# Patient Record
Sex: FEMALE | Race: BLACK OR AFRICAN AMERICAN | NOT HISPANIC OR LATINO | Employment: FULL TIME | URBAN - METROPOLITAN AREA
[De-identification: names, ages, dates, MRNs, and addresses within clinical notes are randomized per-mention and may not be internally consistent; named-entity substitution may affect disease eponyms.]

---

## 2017-11-09 ENCOUNTER — PATIENT MESSAGE (OUTPATIENT)
Dept: OBSTETRICS AND GYNECOLOGY | Facility: CLINIC | Age: 37
End: 2017-11-09

## 2017-11-20 ENCOUNTER — PATIENT MESSAGE (OUTPATIENT)
Dept: OBSTETRICS AND GYNECOLOGY | Facility: CLINIC | Age: 37
End: 2017-11-20

## 2017-12-08 ENCOUNTER — PATIENT MESSAGE (OUTPATIENT)
Dept: OBSTETRICS AND GYNECOLOGY | Facility: CLINIC | Age: 37
End: 2017-12-08

## 2017-12-14 ENCOUNTER — OFFICE VISIT (OUTPATIENT)
Dept: OBSTETRICS AND GYNECOLOGY | Facility: CLINIC | Age: 37
End: 2017-12-14
Attending: OBSTETRICS & GYNECOLOGY
Payer: COMMERCIAL

## 2017-12-14 VITALS
SYSTOLIC BLOOD PRESSURE: 158 MMHG | BODY MASS INDEX: 39.58 KG/M2 | WEIGHT: 252.19 LBS | DIASTOLIC BLOOD PRESSURE: 110 MMHG | HEIGHT: 67 IN

## 2017-12-14 DIAGNOSIS — I10 ESSENTIAL HYPERTENSION: ICD-10-CM

## 2017-12-14 DIAGNOSIS — D25.1 FIBROIDS, INTRAMURAL: ICD-10-CM

## 2017-12-14 DIAGNOSIS — Z01.419 VISIT FOR GYNECOLOGIC EXAMINATION: Primary | ICD-10-CM

## 2017-12-14 DIAGNOSIS — Z80.3 FAMILY HISTORY OF BREAST CANCER: ICD-10-CM

## 2017-12-14 PROCEDURE — 99385 PREV VISIT NEW AGE 18-39: CPT | Mod: S$GLB,,, | Performed by: OBSTETRICS & GYNECOLOGY

## 2017-12-14 PROCEDURE — 88175 CYTOPATH C/V AUTO FLUID REDO: CPT

## 2017-12-14 PROCEDURE — 87624 HPV HI-RISK TYP POOLED RSLT: CPT

## 2017-12-14 PROCEDURE — 99999 PR PBB SHADOW E&M-EST. PATIENT-LVL III: CPT | Mod: PBBFAC,,, | Performed by: OBSTETRICS & GYNECOLOGY

## 2017-12-14 RX ORDER — LABETALOL 200 MG/1
100 TABLET, FILM COATED ORAL EVERY 12 HOURS
Qty: 60 TABLET | Refills: 11 | Status: SHIPPED | OUTPATIENT
Start: 2017-12-14 | End: 2018-06-12 | Stop reason: SDUPTHER

## 2017-12-14 NOTE — PROGRESS NOTES
"CC: Well woman exam    Brianne Blas is a 37 y.o. female  presents for a well woman exam.  She has no issues, problems, or complaints.    Patient is interested in conceiving.      She has had two miscarriages.  She was found at that time to have fibroids on ultrasound.  Last ultrasound in  shows:    Findings: Uterus measures 11.8 x 9.5 x 8.7 cm.  Endometrial echocomplex measures 0.3 cm, within normal limits.  No intrauterine gestational sac identified.  There are multiple uterine leiomyomas.  Dominant mucosal/subserosal leiomyoma located posteriorly measures 6.1 x 5.9 x 5.2 cm.  Additional smaller intramural myomas decrease sensitivity of examination.    Right ovary measures 3.1 x 1.7 x 2.3 cm and demonstrates normal follicles.  Normal ovarian vascular flow is present.    Left ovary measures 3.1 x 1.4 x 2.6 cm and demonstrates normal follicles.  Normal ovarian vascular flow is present.    No free fluid.    She has also had an evaluation for APA which was negative.    She has a history of hypertension and had been on Procardia but self d/c'd due to SE.          Past Medical History:   Diagnosis Date    Hypertension     Migraine        History reviewed. No pertinent surgical history.    OB History    Para Term  AB Living   2       2     SAB TAB Ectopic Multiple Live Births   2              # Outcome Date GA Lbr Padilla/2nd Weight Sex Delivery Anes PTL Lv   2 SAB 10/2016           1 2016                  Family History   Problem Relation Age of Onset    Breast cancer Maternal Grandmother     Hypertension Father     Breast cancer Mother 40     Breast Cancer x 2    Colon cancer Neg Hx     Ovarian cancer Neg Hx        Social History   Substance Use Topics    Smoking status: Never Smoker    Smokeless tobacco: Not on file    Alcohol use No       BP (!) 158/110   Ht 5' 7" (1.702 m)   Wt 114.4 kg (252 lb 3.3 oz)   LMP 2017 (Exact Date)   BMI 39.50 kg/m²     ROS:  GENERAL: " Denies weight gain or weight loss. Feeling well overall.   SKIN: Denies rash or lesions.   HEAD: Denies head injury or headache.   NODES: Denies enlarged lymph nodes.   CHEST: Denies chest pain or shortness of breath.   CARDIOVASCULAR: Denies palpitations or left sided chest pain.   ABDOMEN: No abdominal pain, constipation, diarrhea, nausea, vomiting or rectal bleeding.   URINARY: No frequency, dysuria, hematuria, or burning on urination.  REPRODUCTIVE: See HPI.   BREASTS: The patient performs breast self-examination and denies pain, lumps, or nipple discharge.   HEMATOLOGIC: No easy bruisability or excessive bleeding.  MUSCULOSKELETAL: Denies joint pain or swelling.   NEUROLOGIC: Denies syncope or weakness.   PSYCHIATRIC: Denies depression, anxiety or mood swings.    Physical Exam:    APPEARANCE: Well nourished, well developed, in no acute distress.  AFFECT: WNL, alert and oriented x 3  SKIN: No acne or hirsutism  NECK: Neck symmetric without masses or thyromegaly  NODES: No inguinal, cervical, axillary, or femoral lymph node enlargement  CHEST: Good respiratory effect  ABDOMEN: Soft.  No tenderness or masses.  No hepatosplenomegaly.  No hernias.  BREASTS: Symmetrical, no skin changes or visible lesions.  No palpable masses, nipple discharge bilaterally.  PELVIC: Normal external genitalia without lesions.  Normal hair distribution.  Adequate perineal body, normal urethral meatus.  Vagina moist and well rugated without lesions or discharge.  Cervix pink, without lesions, discharge or tenderness.  No significant cystocele or rectocele.  Bimanual exam shows uterus to be approximately 12 week size, irregular with posterior fullness, mobile and nontender.  Adnexa without masses or tenderness.    EXTREMITIES: No edema.    ASSESSMENT AND PLAN  1. Visit for gynecologic examination  Liquid-based pap smear, screening    HPV High Risk Genotypes, PCR   2. Family history of breast cancer     3. Fibroids, intramural  US Pelvis  Comp with Transvag NON-OB (xpd   4. Essential hypertension  labetalol (NORMODYNE) 200 MG tablet       Patient was counseled today on A.C.S. Pap guidelines and recommendations for yearly pelvic exams, pap smears every 5 years with HPV co-testing, and monthly self breast exams; to see her PCP for other health maintenance.     Discussed fibroids and possible contribution to miscarriage if there is a submucosal component - recommended a repeat ultrasound    Rx for Labetalol give for hypertension - BP check in 1 week    Return in about 1 year (around 12/14/2018).

## 2017-12-19 ENCOUNTER — OFFICE VISIT (OUTPATIENT)
Dept: URGENT CARE | Facility: CLINIC | Age: 37
End: 2017-12-19
Payer: COMMERCIAL

## 2017-12-19 VITALS
RESPIRATION RATE: 18 BRPM | HEART RATE: 74 BPM | SYSTOLIC BLOOD PRESSURE: 142 MMHG | WEIGHT: 252 LBS | TEMPERATURE: 99 F | BODY MASS INDEX: 39.55 KG/M2 | OXYGEN SATURATION: 98 % | DIASTOLIC BLOOD PRESSURE: 83 MMHG | HEIGHT: 67 IN

## 2017-12-19 DIAGNOSIS — R52 BODY ACHES: ICD-10-CM

## 2017-12-19 DIAGNOSIS — J06.9 VIRAL URI WITH COUGH: Primary | ICD-10-CM

## 2017-12-19 LAB
CTP QC/QA: YES
FLUAV AG NPH QL: NEGATIVE
FLUBV AG NPH QL: NEGATIVE

## 2017-12-19 PROCEDURE — 96372 THER/PROPH/DIAG INJ SC/IM: CPT | Mod: S$GLB,,, | Performed by: EMERGENCY MEDICINE

## 2017-12-19 PROCEDURE — 99203 OFFICE O/P NEW LOW 30 MIN: CPT | Mod: 25,S$GLB,, | Performed by: PHYSICIAN ASSISTANT

## 2017-12-19 PROCEDURE — 87804 INFLUENZA ASSAY W/OPTIC: CPT | Mod: 59,QW,S$GLB, | Performed by: PHYSICIAN ASSISTANT

## 2017-12-19 RX ORDER — BENZONATATE 100 MG/1
200 CAPSULE ORAL 3 TIMES DAILY PRN
Qty: 20 CAPSULE | Refills: 0 | Status: SHIPPED | OUTPATIENT
Start: 2017-12-19 | End: 2018-01-11

## 2017-12-19 RX ORDER — BETAMETHASONE SODIUM PHOSPHATE AND BETAMETHASONE ACETATE 3; 3 MG/ML; MG/ML
6 INJECTION, SUSPENSION INTRA-ARTICULAR; INTRALESIONAL; INTRAMUSCULAR; SOFT TISSUE
Status: COMPLETED | OUTPATIENT
Start: 2017-12-19 | End: 2017-12-19

## 2017-12-19 RX ADMIN — BETAMETHASONE SODIUM PHOSPHATE AND BETAMETHASONE ACETATE 6 MG: 3; 3 INJECTION, SUSPENSION INTRA-ARTICULAR; INTRALESIONAL; INTRAMUSCULAR; SOFT TISSUE at 11:12

## 2017-12-19 NOTE — LETTER
December 19, 2017      [facility]  [address] [address], [address]  [place] [address]  Phone: [phone]  Fax: [phone]       Patient: Brianne Blas   YOB: 1980  Date of Visit: 12/19/2017    To Whom It May Concern:    Shania Blas  was at [facility] on 12/19/2017. She may return to work/school on 12/21/2017 with no restrictions. If you have any questions or concerns, or if I can be of further assistance, please do not hesitate to contact me.    Sincerely,    Jackie Caballero PA-C

## 2017-12-19 NOTE — PATIENT INSTRUCTIONS
Viral Upper Respiratory Illness (Adult)  You have a viral upper respiratory illness (URI), which is another term for the common cold. This illness is contagious during the first few days. It is spread through the air by coughing and sneezing. It may also be spread by direct contact (touching the sick person and then touching your own eyes, nose, or mouth). Frequent handwashing will decrease risk of spread. Most viral illnesses go away within 7 to 10 days with rest and simple home remedies. Sometimes the illness may last for several weeks. Antibiotics will not kill a virus, and they are generally not prescribed for this condition.    Home care  · If symptoms are severe, rest at home for the first 2 to 3 days. When you resume activity, don't let yourself get too tired.  · Avoid being exposed to cigarette smoke (yours or others).  · You may use acetaminophen or ibuprofen to control pain and fever, unless another medicine was prescribed. (Note: If you have chronic liver or kidney disease, have ever had a stomach ulcer or gastrointestinal bleeding, or are taking blood-thinning medicines, talk with your healthcare provider before using these medicines.) Aspirin should never be given to anyone under 18 years of age who is ill with a viral infection or fever. It may cause severe liver or brain damage.  · Your appetite may be poor, so a light diet is fine. Avoid dehydration by drinking 6 to 8 glasses of fluids per day (water, soft drinks, juices, tea, or soup). Extra fluids will help loosen secretions in the nose and lungs.  · Over-the-counter cold medicines will not shorten the length of time youre sick, but they may be helpful for the following symptoms: cough, sore throat, and nasal and sinus congestion. (Note: Do not use decongestants if you have high blood pressure.)  Follow-up care  Follow up with your healthcare provider, or as advised.  When to seek medical advice  Call your healthcare provider right away if any  of these occur:  · Cough with lots of colored sputum (mucus)  · Severe headache; face, neck, or ear pain  · Difficulty swallowing due to throat pain  · Fever of 100.4°F (38°C)  Call 911, or get immediate medical care  Call emergency services right away if any of these occur:  · Chest pain, shortness of breath, wheezing, or difficulty breathing  · Coughing up blood  · Inability to swallow due to throat pain  Date Last Reviewed: 9/13/2015  © 4476-6945 [employer]. [address] [place] [address]. All rights reserved. This information is not intended as a substitute for professional medical care. Always follow your healthcare professional's instructions.      Please follow up with your Primary care provider within 2-5 days if your signs and symptoms have not resolved or worsen.     If your condition worsens or fails to improve we recommend that you receive another evaluation at the emergency room immediately or contact your primary medical clinic to discuss your concerns.   You must understand that you have received an Urgent Care treatment only and that you may be released before all of your medical problems are known or treated. You, the patient, will arrange for follow up care as instructed.

## 2017-12-19 NOTE — PROGRESS NOTES
"Subjective:       Patient ID: Brianne Blas is a 37 y.o. female.    Vitals:  height is 5' 7" (1.702 m) and weight is 114.3 kg (252 lb). Her temperature is 99.4 °F (37.4 °C). Her blood pressure is 142/83 (abnormal) and her pulse is 74. Her respiration is 18 and oxygen saturation is 98%.     Chief Complaint: Cough and Sinus Problem    Cough   This is a new problem. The current episode started in the past 7 days. The problem has been gradually worsening. The cough is productive of sputum. Associated symptoms include nasal congestion and postnasal drip. Pertinent negatives include no chest pain, chills, ear pain, eye redness, fever, headaches, myalgias, sore throat, shortness of breath or wheezing. She has tried OTC cough suppressant for the symptoms. The treatment provided mild relief.   Sinus Problem   This is a new problem. The current episode started in the past 7 days. The problem has been gradually worsening since onset. There has been no fever. Associated symptoms include congestion and coughing. Pertinent negatives include no chills, ear pain, headaches, hoarse voice, shortness of breath or sore throat. Past treatments include oral decongestants. The treatment provided no relief.     Review of Systems   Constitution: Positive for weakness. Negative for chills, fever and malaise/fatigue.   HENT: Positive for congestion and postnasal drip. Negative for ear pain, hoarse voice and sore throat.    Eyes: Negative for discharge and redness.   Cardiovascular: Negative for chest pain, dyspnea on exertion and leg swelling.   Respiratory: Positive for cough and sputum production. Negative for shortness of breath and wheezing.    Musculoskeletal: Negative for myalgias.   Gastrointestinal: Negative for abdominal pain and nausea.   Neurological: Negative for headaches.       Objective:      Physical Exam   Constitutional: She is oriented to person, place, and time. She appears well-developed and well-nourished. She is " cooperative.  Non-toxic appearance. She does not appear ill. No distress.   HENT:   Head: Normocephalic and atraumatic.   Right Ear: Hearing, tympanic membrane, external ear and ear canal normal.   Left Ear: Hearing, tympanic membrane, external ear and ear canal normal.   Nose: Rhinorrhea present. No mucosal edema or nasal deformity. No epistaxis. Right sinus exhibits no maxillary sinus tenderness and no frontal sinus tenderness. Left sinus exhibits no maxillary sinus tenderness and no frontal sinus tenderness.   Mouth/Throat: Uvula is midline and mucous membranes are normal. No trismus in the jaw. Normal dentition. No uvula swelling. Posterior oropharyngeal erythema present.   Eyes: Conjunctivae and lids are normal. No scleral icterus.   Sclera clear bilat   Neck: Trachea normal, full passive range of motion without pain and phonation normal. Neck supple.   Cardiovascular: Normal rate, regular rhythm, normal heart sounds, intact distal pulses and normal pulses.    Pulmonary/Chest: Effort normal and breath sounds normal. No accessory muscle usage. No respiratory distress. She has no decreased breath sounds. She has no wheezes. She has no rhonchi. She has no rales.   Occasional nonproductive cough   Abdominal: Soft. Normal appearance and bowel sounds are normal. She exhibits no distension. There is no tenderness.   Musculoskeletal: Normal range of motion. She exhibits no edema or deformity.   Neurological: She is alert and oriented to person, place, and time. She exhibits normal muscle tone. Coordination normal.   Skin: Skin is warm, dry and intact. She is not diaphoretic. No pallor.   Psychiatric: She has a normal mood and affect. Her speech is normal and behavior is normal. Judgment and thought content normal. Cognition and memory are normal.   Nursing note and vitals reviewed.      Assessment:       1. Viral URI with cough    2. Body aches        Plan:         Viral URI with cough  -     benzonatate (TESSALON  PERLES) 100 MG capsule; Take 2 capsules (200 mg total) by mouth 3 (three) times daily as needed for Cough.  Dispense: 20 capsule; Refill: 0  -     betamethasone acetate-betamethasone sodium phosphate injection 6 mg; Inject 1 mL (6 mg total) into the muscle one time.    Body aches  -     POCT Influenza A/B        Viral Upper Respiratory Illness (Adult)  You have a viral upper respiratory illness (URI), which is another term for the common cold. This illness is contagious during the first few days. It is spread through the air by coughing and sneezing. It may also be spread by direct contact (touching the sick person and then touching your own eyes, nose, or mouth). Frequent handwashing will decrease risk of spread. Most viral illnesses go away within 7 to 10 days with rest and simple home remedies. Sometimes the illness may last for several weeks. Antibiotics will not kill a virus, and they are generally not prescribed for this condition.    Home care  · If symptoms are severe, rest at home for the first 2 to 3 days. When you resume activity, don't let yourself get too tired.  · Avoid being exposed to cigarette smoke (yours or others).  · You may use acetaminophen or ibuprofen to control pain and fever, unless another medicine was prescribed. (Note: If you have chronic liver or kidney disease, have ever had a stomach ulcer or gastrointestinal bleeding, or are taking blood-thinning medicines, talk with your healthcare provider before using these medicines.) Aspirin should never be given to anyone under 18 years of age who is ill with a viral infection or fever. It may cause severe liver or brain damage.  · Your appetite may be poor, so a light diet is fine. Avoid dehydration by drinking 6 to 8 glasses of fluids per day (water, soft drinks, juices, tea, or soup). Extra fluids will help loosen secretions in the nose and lungs.  · Over-the-counter cold medicines will not shorten the length of time youre sick, but they  may be helpful for the following symptoms: cough, sore throat, and nasal and sinus congestion. (Note: Do not use decongestants if you have high blood pressure.)  Follow-up care  Follow up with your healthcare provider, or as advised.  When to seek medical advice  Call your healthcare provider right away if any of these occur:  · Cough with lots of colored sputum (mucus)  · Severe headache; face, neck, or ear pain  · Difficulty swallowing due to throat pain  · Fever of 100.4°F (38°C)  Call 911, or get immediate medical care  Call emergency services right away if any of these occur:  · Chest pain, shortness of breath, wheezing, or difficulty breathing  · Coughing up blood  · Inability to swallow due to throat pain  Date Last Reviewed: 9/13/2015  © 7842-1656 [employer]. [address] [place] [address], [place] [address]. All rights reserved. This information is not intended as a substitute for professional medical care. Always follow your healthcare professional's instructions.      Please follow up with your Primary care provider within 2-5 days if your signs and symptoms have not resolved or worsen.     If your condition worsens or fails to improve we recommend that you receive another evaluation at the emergency room immediately or contact your primary medical clinic to discuss your concerns.   You must understand that you have received an Urgent Care treatment only and that you may be released before all of your medical problems are known or treated. You, the patient, will arrange for follow up care as instructed.

## 2017-12-20 ENCOUNTER — HOSPITAL ENCOUNTER (OUTPATIENT)
Dept: RADIOLOGY | Facility: HOSPITAL | Age: 37
Discharge: HOME | End: 2017-12-20
Attending: OBSTETRICS & GYNECOLOGY
Payer: COMMERCIAL

## 2017-12-20 DIAGNOSIS — D25.1 FIBROIDS, INTRAMURAL: ICD-10-CM

## 2017-12-20 LAB
HPV16 AG SPEC QL: NEGATIVE
HPV16+18+H RISK 12 DNA CVX-IMP: NEGATIVE
HPV18 DNA SPEC QL NAA+PROBE: NEGATIVE

## 2017-12-20 PROCEDURE — 76856 US EXAM PELVIC COMPLETE: CPT | Mod: TC

## 2017-12-20 PROCEDURE — 76830 TRANSVAGINAL US NON-OB: CPT | Mod: 26,,, | Performed by: RADIOLOGY

## 2017-12-20 PROCEDURE — 76856 US EXAM PELVIC COMPLETE: CPT | Mod: 26,,, | Performed by: RADIOLOGY

## 2017-12-21 ENCOUNTER — PATIENT MESSAGE (OUTPATIENT)
Dept: OBSTETRICS AND GYNECOLOGY | Facility: CLINIC | Age: 37
End: 2017-12-21

## 2017-12-21 ENCOUNTER — OFFICE VISIT (OUTPATIENT)
Dept: OBSTETRICS AND GYNECOLOGY | Facility: CLINIC | Age: 37
End: 2017-12-21
Payer: COMMERCIAL

## 2017-12-21 VITALS
SYSTOLIC BLOOD PRESSURE: 140 MMHG | DIASTOLIC BLOOD PRESSURE: 84 MMHG | WEIGHT: 252.13 LBS | BODY MASS INDEX: 39.57 KG/M2 | HEIGHT: 67 IN

## 2017-12-21 DIAGNOSIS — Z01.30 BP CHECK: Primary | ICD-10-CM

## 2017-12-21 DIAGNOSIS — I10 ESSENTIAL HYPERTENSION: ICD-10-CM

## 2017-12-21 PROCEDURE — 99999 PR PBB SHADOW E&M-EST. PATIENT-LVL III: CPT | Mod: PBBFAC,,, | Performed by: NURSE PRACTITIONER

## 2017-12-21 PROCEDURE — 99213 OFFICE O/P EST LOW 20 MIN: CPT | Mod: S$GLB,,, | Performed by: NURSE PRACTITIONER

## 2017-12-21 NOTE — PROGRESS NOTES
CC: BP check    HPI: Pt is a 37 y.o.  female who presents for a BP check. She recently saw Dr. Hammonds and was dx with HTN. She was started on labetalol 100 mg PO BID. She is not having any problems today. She has been taking it for 1 week now. BP at last visit was 158/110. Today it is 140/84.    ROS:  GENERAL: Feeling well overall. Denies fever or chills.   SKIN: Denies rash or lesions.   HEAD: Denies head injury or headache.   NODES: Denies enlarged lymph nodes.   CHEST: Denies chest pain or shortness of breath.   CARDIOVASCULAR: Denies palpitations or left sided chest pain.   ABDOMEN: No abdominal pain, constipation, diarrhea, nausea, vomiting or rectal bleeding.   URINARY: No dysuria, hematuria, or burning on urination.  REPRODUCTIVE: See HPI.   BREASTS: Denies pain, lumps, or nipple discharge.   HEMATOLOGIC: No easy bruisability or excessive bleeding.   MUSCULOSKELETAL: Denies joint pain or swelling.   NEUROLOGIC: Denies syncope or weakness.   PSYCHIATRIC: Denies depression, anxiety or mood swings.    PE:   APPEARANCE: Well nourished, well developed, Black or  female in no acute distress.  PELVIC: Deferred    Diagnosis:  1. BP check    2. Essential hypertension        Plan:   -continue with labetalol as instructed.

## 2018-01-11 ENCOUNTER — HOSPITAL ENCOUNTER (OUTPATIENT)
Dept: RADIOLOGY | Facility: HOSPITAL | Age: 38
Discharge: HOME | End: 2018-01-11
Attending: OBSTETRICS & GYNECOLOGY
Payer: COMMERCIAL

## 2018-01-11 ENCOUNTER — OFFICE VISIT (OUTPATIENT)
Dept: OBSTETRICS AND GYNECOLOGY | Facility: CLINIC | Age: 38
End: 2018-01-11
Attending: OBSTETRICS & GYNECOLOGY
Payer: COMMERCIAL

## 2018-01-11 VITALS
BODY MASS INDEX: 38.72 KG/M2 | HEIGHT: 67 IN | WEIGHT: 246.69 LBS | DIASTOLIC BLOOD PRESSURE: 110 MMHG | SYSTOLIC BLOOD PRESSURE: 150 MMHG

## 2018-01-11 DIAGNOSIS — D25.1 FIBROIDS, INTRAMURAL: Primary | ICD-10-CM

## 2018-01-11 DIAGNOSIS — D25.1 FIBROIDS, INTRAMURAL: ICD-10-CM

## 2018-01-11 PROCEDURE — 99999 PR PBB SHADOW E&M-EST. PATIENT-LVL III: CPT | Mod: PBBFAC,,, | Performed by: OBSTETRICS & GYNECOLOGY

## 2018-01-11 PROCEDURE — 99214 OFFICE O/P EST MOD 30 MIN: CPT | Mod: S$GLB,,, | Performed by: OBSTETRICS & GYNECOLOGY

## 2018-01-11 PROCEDURE — 25500020 PHARM REV CODE 255: Performed by: OBSTETRICS & GYNECOLOGY

## 2018-01-11 PROCEDURE — A9585 GADOBUTROL INJECTION: HCPCS | Performed by: OBSTETRICS & GYNECOLOGY

## 2018-01-11 PROCEDURE — 72197 MRI PELVIS W/O & W/DYE: CPT | Mod: 26,,, | Performed by: RADIOLOGY

## 2018-01-11 PROCEDURE — 72197 MRI PELVIS W/O & W/DYE: CPT | Mod: TC

## 2018-01-11 RX ORDER — GADOBUTROL 604.72 MG/ML
10 INJECTION INTRAVENOUS
Status: COMPLETED | OUTPATIENT
Start: 2018-01-11 | End: 2018-01-11

## 2018-01-11 RX ORDER — ALPRAZOLAM 1 MG/1
1 TABLET ORAL ONCE
Qty: 1 TABLET | Refills: 0 | Status: SHIPPED | OUTPATIENT
Start: 2018-01-11 | End: 2018-03-08 | Stop reason: CLARIF

## 2018-01-11 RX ADMIN — GADOBUTROL 10 ML: 604.72 INJECTION INTRAVENOUS at 02:01

## 2018-01-14 ENCOUNTER — PATIENT MESSAGE (OUTPATIENT)
Dept: OBSTETRICS AND GYNECOLOGY | Facility: CLINIC | Age: 38
End: 2018-01-14

## 2018-01-18 NOTE — PROGRESS NOTES
"CC: Symptoms related to fibroids    Brianne Blas is a 37 y.o. female  presents to discuss management of fibroids.      She has had two miscarriages.  She was found at that time to have fibroids on ultrasound.      Recent ultrasound showed:    Size: 10 x 8.2 x 9.3 cm         Appearance: There are multiple heterogeneous lesions seen throughout the uterus.  There is a dominant intramural lesion in the posterior fundus measuring 4.6 x 4.6 x 4.6 CM.  There is an additional subserosal lesion measuring 6.2 x 5.2 x 6.3 CM.  Multiple additional small intramural lesions are noted.       Endometrial stripe: 8 mm    Right ovary:       Size: 2.7 x 3.2 x 2.4 cm       Appearance: Normal        Flow: normal arterial and venous flow with RI 0.4    Left ovary:       Size: 2.5 x 2.1 x 1.5 cm       Appearance: Normal        Flow: normal arterial and venous flow with RI 0.4    Other: There is trace free fluid free fluid    Past Medical History:   Diagnosis Date    Hypertension     Migraine        History reviewed. No pertinent surgical history.    Family History   Problem Relation Age of Onset    Breast cancer Maternal Grandmother     Hypertension Father     Breast cancer Mother 40     Breast Cancer x 2    Colon cancer Neg Hx     Ovarian cancer Neg Hx        Social History   Substance Use Topics    Smoking status: Never Smoker    Smokeless tobacco: Never Used    Alcohol use No       OB History    Para Term  AB Living   2       2     SAB TAB Ectopic Multiple Live Births   2              # Outcome Date GA Lbr Padilla/2nd Weight Sex Delivery Anes PTL Lv   2 SAB 10/2016           1 2016                  BP (!) 150/110 (BP Location: Left arm, Patient Position: Sitting, BP Method: Large (Manual))   Ht 5' 7" (1.702 m)   Wt 111.9 kg (246 lb 11.1 oz)   LMP 2017 (Exact Date)   BMI 38.64 kg/m²     ROS:  GENERAL: Denies weight gain or weight loss. Feeling well overall.   SKIN: Denies rash or lesions. "   HEAD: Denies head injury or headache.   NODES: Denies enlarged lymph nodes.   CHEST: Denies chest pain or shortness of breath.   CARDIOVASCULAR: Denies palpitations or left sided chest pain.   ABDOMEN: No abdominal pain, constipation, diarrhea, nausea, vomiting or rectal bleeding.   URINARY: No frequency, dysuria, hematuria, or burning on urination.  REPRODUCTIVE: See HPI.   HEMATOLOGIC: No easy bruisability or excessive bleeding with the exception of menstrual cycles.  MUSCULOSKELETAL: Denies joint pain or swelling.   NEUROLOGIC: Denies syncope or weakness.   PSYCHIATRIC: Denies depression, anxiety or mood swings.    PHYSICAL EXAM:  Deferred      ICD-10-CM ICD-9-CM    1. Fibroids, intramural D25.1 218.1 MRI Pelvis W WO Contrast      ALPRAZolam (XANAX) 1 MG tablet         Patient was counseled today on treatment options for fibroids.  I do recommend myomectomy prior to conceiving.  Discussed the limitations of ultrasound and recommended MRI to determine if she is a candidate to remove the laparoscopically.      Will determine course of action based on MRI results.

## 2018-01-22 ENCOUNTER — TELEPHONE (OUTPATIENT)
Dept: OBSTETRICS AND GYNECOLOGY | Facility: CLINIC | Age: 38
End: 2018-01-22

## 2018-01-22 ENCOUNTER — PATIENT MESSAGE (OUTPATIENT)
Dept: OBSTETRICS AND GYNECOLOGY | Facility: CLINIC | Age: 38
End: 2018-01-22

## 2018-01-23 ENCOUNTER — PATIENT MESSAGE (OUTPATIENT)
Dept: OBSTETRICS AND GYNECOLOGY | Facility: CLINIC | Age: 38
End: 2018-01-23

## 2018-01-23 ENCOUNTER — TELEPHONE (OUTPATIENT)
Dept: OBSTETRICS AND GYNECOLOGY | Facility: CLINIC | Age: 38
End: 2018-01-23

## 2018-01-23 NOTE — TELEPHONE ENCOUNTER
My first available is March 13.  If that's too far away, please call the OR to see if Feb 23rd is available in the morning

## 2018-01-23 NOTE — TELEPHONE ENCOUNTER
Spoke with Heydi in Sx scheduling. Heydi stated that Dr. Hammonds can start at 7:30AM on Feb 23rd. Is it okay to contact the pt to schedule pre-op and pre-admit appointments? Please advise.

## 2018-01-24 DIAGNOSIS — D25.0 INTRAMURAL, SUBMUCOUS, AND SUBSEROUS LEIOMYOMA OF UTERUS: Primary | ICD-10-CM

## 2018-01-24 DIAGNOSIS — D25.2 INTRAMURAL, SUBMUCOUS, AND SUBSEROUS LEIOMYOMA OF UTERUS: Primary | ICD-10-CM

## 2018-01-24 DIAGNOSIS — D25.1 INTRAMURAL, SUBMUCOUS, AND SUBSEROUS LEIOMYOMA OF UTERUS: Primary | ICD-10-CM

## 2018-01-24 DIAGNOSIS — D25.1 FIBROIDS, INTRAMURAL: ICD-10-CM

## 2018-01-24 RX ORDER — SODIUM CHLORIDE 9 MG/ML
INJECTION, SOLUTION INTRAVENOUS CONTINUOUS
Status: CANCELLED | OUTPATIENT
Start: 2018-01-24

## 2018-01-25 ENCOUNTER — PATIENT MESSAGE (OUTPATIENT)
Dept: OBSTETRICS AND GYNECOLOGY | Facility: CLINIC | Age: 38
End: 2018-01-25

## 2018-02-08 ENCOUNTER — TELEPHONE (OUTPATIENT)
Dept: OBSTETRICS AND GYNECOLOGY | Facility: CLINIC | Age: 38
End: 2018-02-08

## 2018-02-08 NOTE — TELEPHONE ENCOUNTER
Pt returned call to the clinic. Inquired if the pt can move her Sx to 3/27. Pt stated that she could. Informed the pt that her pre-op and pre-admit appointments can stay as is. Pt informed to contact the clinic if she needs a letter written for her job stating that her Sx date has changed. Pt verbalized understanding.      Spoke with Danna in Sx scheduling. Informed Danna to move the pt's Sx from 3/13 to 3/27. Danna verbalized understanding and stated that it would be an 8:30AM start. Verbalized understanding with Danna.      Pt accepted 3/27 as new Sx date.

## 2018-02-08 NOTE — TELEPHONE ENCOUNTER
Attempted to contact the pt to inquire if she can reschedule her Sx to 3/27. No answer, left VM message for the pt to call the clinic back.

## 2018-03-08 ENCOUNTER — TELEPHONE (OUTPATIENT)
Dept: OBSTETRICS AND GYNECOLOGY | Facility: CLINIC | Age: 38
End: 2018-03-08

## 2018-03-08 ENCOUNTER — ANESTHESIA EVENT (OUTPATIENT)
Dept: SURGERY | Facility: OTHER | Age: 38
End: 2018-03-08
Payer: COMMERCIAL

## 2018-03-08 ENCOUNTER — HOSPITAL ENCOUNTER (OUTPATIENT)
Dept: PREADMISSION TESTING | Facility: OTHER | Age: 38
Discharge: HOME | End: 2018-03-08
Attending: OBSTETRICS & GYNECOLOGY
Payer: COMMERCIAL

## 2018-03-08 VITALS
DIASTOLIC BLOOD PRESSURE: 84 MMHG | TEMPERATURE: 98 F | HEIGHT: 67 IN | OXYGEN SATURATION: 100 % | WEIGHT: 240 LBS | BODY MASS INDEX: 37.67 KG/M2 | SYSTOLIC BLOOD PRESSURE: 156 MMHG | HEART RATE: 60 BPM

## 2018-03-08 DIAGNOSIS — D25.2 INTRAMURAL, SUBMUCOUS, AND SUBSEROUS LEIOMYOMA OF UTERUS: Primary | ICD-10-CM

## 2018-03-08 DIAGNOSIS — Z01.818 PREOPERATIVE TESTING: ICD-10-CM

## 2018-03-08 DIAGNOSIS — D25.1 INTRAMURAL, SUBMUCOUS, AND SUBSEROUS LEIOMYOMA OF UTERUS: Primary | ICD-10-CM

## 2018-03-08 DIAGNOSIS — D25.0 INTRAMURAL, SUBMUCOUS, AND SUBSEROUS LEIOMYOMA OF UTERUS: Primary | ICD-10-CM

## 2018-03-08 LAB
ANION GAP SERPL CALC-SCNC: 8 MMOL/L
BUN SERPL-MCNC: 12 MG/DL
CALCIUM SERPL-MCNC: 9.3 MG/DL
CHLORIDE SERPL-SCNC: 107 MMOL/L
CO2 SERPL-SCNC: 25 MMOL/L
CREAT SERPL-MCNC: 0.7 MG/DL
ERYTHROCYTE [DISTWIDTH] IN BLOOD BY AUTOMATED COUNT: 15 %
EST. GFR  (AFRICAN AMERICAN): >60 ML/MIN/1.73 M^2
EST. GFR  (NON AFRICAN AMERICAN): >60 ML/MIN/1.73 M^2
GLUCOSE SERPL-MCNC: 73 MG/DL
HCT VFR BLD AUTO: 32.6 %
HGB BLD-MCNC: 10.1 G/DL
MCH RBC QN AUTO: 26.6 PG
MCHC RBC AUTO-ENTMCNC: 31 G/DL
MCV RBC AUTO: 86 FL
PLATELET # BLD AUTO: 287 K/UL
PMV BLD AUTO: 9.8 FL
POTASSIUM SERPL-SCNC: 4.3 MMOL/L
RBC # BLD AUTO: 3.79 M/UL
SODIUM SERPL-SCNC: 140 MMOL/L
WBC # BLD AUTO: 5.72 K/UL

## 2018-03-08 PROCEDURE — 85027 COMPLETE CBC AUTOMATED: CPT

## 2018-03-08 PROCEDURE — 93010 ELECTROCARDIOGRAM REPORT: CPT | Mod: ,,, | Performed by: INTERNAL MEDICINE

## 2018-03-08 PROCEDURE — 93005 ELECTROCARDIOGRAM TRACING: CPT

## 2018-03-08 PROCEDURE — 80048 BASIC METABOLIC PNL TOTAL CA: CPT

## 2018-03-08 PROCEDURE — 36415 COLL VENOUS BLD VENIPUNCTURE: CPT

## 2018-03-08 RX ORDER — OXYCODONE HYDROCHLORIDE 5 MG/1
5 TABLET ORAL ONCE AS NEEDED
Status: CANCELLED | OUTPATIENT
Start: 2018-03-08 | End: 2018-03-08

## 2018-03-08 RX ORDER — SODIUM CHLORIDE, SODIUM LACTATE, POTASSIUM CHLORIDE, CALCIUM CHLORIDE 600; 310; 30; 20 MG/100ML; MG/100ML; MG/100ML; MG/100ML
INJECTION, SOLUTION INTRAVENOUS CONTINUOUS
Status: CANCELLED | OUTPATIENT
Start: 2018-03-08

## 2018-03-08 RX ORDER — PREGABALIN 75 MG/1
75 CAPSULE ORAL ONCE
Status: CANCELLED | OUTPATIENT
Start: 2018-03-08 | End: 2018-03-08

## 2018-03-08 RX ORDER — LIDOCAINE HYDROCHLORIDE 10 MG/ML
0.5 INJECTION, SOLUTION EPIDURAL; INFILTRATION; INTRACAUDAL; PERINEURAL ONCE
Status: CANCELLED | OUTPATIENT
Start: 2018-03-08 | End: 2018-03-08

## 2018-03-08 RX ORDER — FAMOTIDINE 20 MG/1
20 TABLET, FILM COATED ORAL
Status: CANCELLED | OUTPATIENT
Start: 2018-03-08 | End: 2018-03-08

## 2018-03-08 RX ORDER — MIDAZOLAM HYDROCHLORIDE 5 MG/ML
4 INJECTION INTRAMUSCULAR; INTRAVENOUS
Status: CANCELLED | OUTPATIENT
Start: 2018-03-08

## 2018-03-08 NOTE — PRE ADMISSION SCREENING
Pt inquired about donating own blood for surgery.  Contacted blood bank and told to call Donor Center at [facility].  Was told patient has to  document to be filled out by patient's surgeon requesting number of units.  States blood must be drawn no later than seven days prior to surgery.  Blood would be sent to [facility] for day of surgery.  There is a fee for this transaction.  All  Info relayed to patient via telephone conversation.  States she understands.

## 2018-03-08 NOTE — TELEPHONE ENCOUNTER
Contacted the pt to r/s pre-op appointment due to her running late in traffic. No answer, left VM message for the pt to call the clinic back.

## 2018-03-08 NOTE — ANESTHESIA PREPROCEDURE EVALUATION
03/08/2018  Brianne Blas is a 37 y.o., female.    Anesthesia Evaluation         Review of Systems  Anesthesia Hx:  No problems with previous Anesthesia   Social:  Non-Smoker    Hematology/Oncology:  Hematology Normal   Oncology Normal     EENT/Dental:EENT/Dental Normal   Cardiovascular:   Hypertension    Pulmonary:  Pulmonary Normal    Renal/:  Renal/ Normal     Hepatic/GI:  Hepatic/GI Normal    Musculoskeletal:  Musculoskeletal Normal    Neurological:   Headaches    Endocrine:  Endocrine Normal    Dermatological:  Skin Normal    Psych:  Psychiatric Normal           Physical Exam  General:  Obesity    Airway/Jaw/Neck:  Airway Findings: Mouth Opening: Normal General Airway Assessment: Adult  Mallampati: I      Dental:  Dental Findings: In tact        Mental Status:  Mental Status Findings:  Cooperative, Alert and Oriented         Anesthesia Plan  Type of Anesthesia, risks & benefits discussed:  Anesthesia Type:  general  Patient's Preference:   Intra-op Monitoring Plan: standard ASA monitors  Intra-op Monitoring Plan Comments:   Post Op Pain Control Plan: multimodal analgesia  Post Op Pain Control Plan Comments:   Induction:   IV  Beta Blocker:         Informed Consent: Patient understands risks and agrees with Anesthesia plan.  Questions answered. Anesthesia consent signed with patient.  ASA Score: 2     Day of Surgery Review of History & Physical:    H&P update referred to the surgeon.         Ready For Surgery From Anesthesia Perspective.

## 2018-03-08 NOTE — DISCHARGE INSTRUCTIONS
PRE-ADMIT TESTING -  [phone]    [address]  [facility]          Your surgery has been scheduled at [facility]. We are pleased to have the opportunity to serve you. For Further Information please call [phone].    On the day of surgery please report to the Information Desk on the 1st floor of [facility].    · CONTACT YOUR PHYSICIAN'S OFFICE THE DAY PRIOR TO YOUR SURGERY TO OBTAIN YOUR ARRIVAL TIME.     · The evening before surgery do not eat anything after 9 p.m. ( this includes hard candy, chewing gum and mints).  You may only have GATORADE, POWERADE AND WATER  from 9 p.m. until you leave your home.   DO NOT DRINK ANY LIQUIDS ON THE WAY TO THE HOSPITAL.      SPECIAL MEDICATION INSTRUCTIONS: TAKE medications checked off by the Anesthesiologist on your Medication List.    Surgery Patients:  If you take ASPIRIN - Your PHYSICIAN/SURGEON will need to inform you IF/OR when you need to stop taking aspirin prior to your surgery.     Do Not take any medications containing IBUPROFEN.  Plain Tylenol is OK to take.    Do Not Wear any make-up or dark nail polish   (especially eye make-up) to surgery. If you come to surgery with makeup on you will be required to remove the makeup or nail polish.    Do not shave your surgical area at least 5 days prior to your surgery. The surgical prep will be performed at the hospital according to Infection Control regulations.    Leave all valuables at home.   Do Not wear any jewelry or watches, including any metal in body piercings.  Contact Lens must be removed before surgery. Either do not wear the contact lens or bring a case and solution for storage.  Please bring a container for eyeglasses or dentures as required.  Bring any paperwork your physician has provided, such as consent forms,  history and physicals, doctor's orders, etc.   Bring comfortable clothes that are loose fitting to wear upon discharge. Take into consideration the  type of surgery being performed.  Maintain your diet as advised per your physician the day prior to surgery.      Adequate rest the night before surgery is advised.   Park in the Parking lot behind the hospital or in the [facility] across the street from the parking lot. Parking is complimentary.  If you will be discharged the same day as your procedure, please arrange for a responsible adult to drive you home or to accompany you if traveling by taxi.   YOU WILL NOT BE PERMITTED TO DRIVE OR TO LEAVE THE HOSPITAL ALONE AFTER SURGERY.   It is strongly recommended that you arrange for someone to remain with you for the first 24 hrs following your surgery.       Thank you for your cooperation.  The Staff of [facility].        Bathing Instructions                                                                 Please shower the evening before and morning of your procedure with    ANTIBACTERIAL SOAP. ( DIAL, etc )  Concentrate on the surgical area   for at least 3 minutes and rinse completely. Dry off as usual.   Do not use any deodorant, powder, body lotions, perfume, after shave or    cologne.

## 2018-03-12 ENCOUNTER — OFFICE VISIT (OUTPATIENT)
Dept: OBSTETRICS AND GYNECOLOGY | Facility: CLINIC | Age: 38
End: 2018-03-12
Attending: OBSTETRICS & GYNECOLOGY
Payer: COMMERCIAL

## 2018-03-12 VITALS
BODY MASS INDEX: 39.1 KG/M2 | SYSTOLIC BLOOD PRESSURE: 136 MMHG | HEIGHT: 67 IN | WEIGHT: 249.13 LBS | DIASTOLIC BLOOD PRESSURE: 80 MMHG

## 2018-03-12 DIAGNOSIS — D25.1 FIBROIDS, INTRAMURAL: ICD-10-CM

## 2018-03-12 DIAGNOSIS — D50.0 IRON DEFICIENCY ANEMIA DUE TO CHRONIC BLOOD LOSS: ICD-10-CM

## 2018-03-12 DIAGNOSIS — Z01.818 PREOPERATIVE EXAM FOR GYNECOLOGIC SURGERY: Primary | ICD-10-CM

## 2018-03-12 PROCEDURE — 99999 PR PBB SHADOW E&M-EST. PATIENT-LVL II: CPT | Mod: PBBFAC,,, | Performed by: OBSTETRICS & GYNECOLOGY

## 2018-03-12 PROCEDURE — 99499 UNLISTED E&M SERVICE: CPT | Mod: S$GLB,,, | Performed by: OBSTETRICS & GYNECOLOGY

## 2018-03-12 NOTE — PROGRESS NOTES
CC: Preop exam    Briannegarry Blas is a 37 y.o. female  presents for a pre-op exam for an abdominal myomectomy scheduled on 3/27/2018.      She has had two miscarriages.  She was found at that time to have fibroids on ultrasound.  She also reports heavy menses.  She uses 4-5 overnight pads on her heaviest days.      She has anemia based on her preop labs and is taking a PNV with iron.       MRI shows    MRI pelvis before and after gadovist 10 cc intravenous.  Comparison pelvic ultrasound 2017.  Mild sized posterior central disc prolapse L5-S1 with mild compression adjacent spinal sac.    Mild size free fluid cul-de-sac.    Right ovary 3 x 2.1 CM, tiny follicular cyst.  Left ovary 4 x 2.7 CM, probably degenerating cyst 1.7 CM inferior lateral ovary.    Uterus enlarged deformed by multiple fibroids, with displacement, effacement, uterine cavity anterior left by the multiple fibroids.    Uterus 10 x 10.8 x 10 CM, nabothian cysts cervical canal, junction zone normal.  Majority fibroid subserosal, submucosal.    Largest fibroid posterior body, with focal femurs cystic, central necrotic change and be fibroid 7 CM size.  Additional fibroids include right lateral body, 4.7 and posterior left body 4.7 CM situated along anterior margin of the major posterior body fibroid.  Additional smaller fibroids anterior superior uterine body and fundus.    Postcontrast study shows varying degrees of enhancement of fibroids.      Past Medical History:   Diagnosis Date    Fibroids     Hypertension     Migraine        Past Surgical History:   Procedure Laterality Date    NO PAST SURGERIES         OB History    Para Term  AB Living   2       2     SAB TAB Ectopic Multiple Live Births   2              # Outcome Date GA Lbr Padilla/2nd Weight Sex Delivery Anes PTL Lv   2 SAB 10/2016           1 2016                  Family History   Problem Relation Age of Onset    Breast cancer Maternal Grandmother      "Hypertension Father     Breast cancer Mother 40     Breast Cancer x 2    Colon cancer Neg Hx     Ovarian cancer Neg Hx        Social History   Substance Use Topics    Smoking status: Never Smoker    Smokeless tobacco: Never Used    Alcohol use No       /80   Ht 5' 7" (1.702 m)   Wt 113 kg (249 lb 1.9 oz)   LMP 02/22/2018 (Approximate)   BMI 39.02 kg/m²     ROS:  GENERAL: Denies weight gain or weight loss. Feeling well overall.   SKIN: Denies rash or lesions.   HEAD: Denies head injury or headache.   NODES: Denies enlarged lymph nodes.   CHEST: Denies chest pain or shortness of breath.   CARDIOVASCULAR: Denies palpitations or left sided chest pain.   ABDOMEN: No abdominal pain, constipation, diarrhea, nausea, vomiting or rectal bleeding.   URINARY: No frequency, dysuria, hematuria, or burning on urination.  REPRODUCTIVE: See HPI.   HEMATOLOGIC: No easy bruisability or excessive bleeding with the exception of menstrual cycles.  MUSCULOSKELETAL: Denies joint pain or swelling.   NEUROLOGIC: Denies syncope or weakness.   PSYCHIATRIC: Denies depression, anxiety or mood swings.    PHYSICAL EXAM:  APPEARANCE: Well nourished, well developed, in no acute distress.  AFFECT: WNL, alert and oriented x 3  SKIN: No acne or hirsutism  NECK: Neck symmetric without masses or thyromegaly  NODES: No inguinal, cervical, axillary, or femoral lymph node enlargement  CHEST: Good respiratory effect  ABDOMEN: Soft.  No tenderness or masses.  No hepatosplenomegaly.  No hernias.  PELVIC: Deferred  EXTREMITIES: No edema.      ICD-10-CM ICD-9-CM    1. Preoperative exam for gynecologic surgery Z01.818 V72.83 Post Partum Type and Screen   2. Fibroids, intramural D25.1 218.1    3. Iron deficiency anemia due to chronic blood loss D50.0 280.0        Discussed fibroids and possible contribution to miscarriage since there is a submucosal component.  Based on MRI there are too many to remove laparoscopically.  Recommended over the " counter iron in addition to prenatal vitamin.  Discussed the need to delay conception for 4-6 months and the need for  for delivery.    I have discussed the risks, benefits, indications, and alternatives of the procedure in detail.  The patient verbalizes her understanding.  All questions answered.  Consents signed.  The patient agrees to proceed to proceed as planned.

## 2018-03-19 ENCOUNTER — PATIENT MESSAGE (OUTPATIENT)
Dept: SURGERY | Facility: OTHER | Age: 38
End: 2018-03-19

## 2018-03-26 ENCOUNTER — LAB VISIT (OUTPATIENT)
Dept: LAB | Facility: OTHER | Age: 38
End: 2018-03-26
Attending: OBSTETRICS & GYNECOLOGY
Payer: COMMERCIAL

## 2018-03-26 ENCOUNTER — TELEPHONE (OUTPATIENT)
Dept: OBSTETRICS AND GYNECOLOGY | Facility: CLINIC | Age: 38
End: 2018-03-26

## 2018-03-26 DIAGNOSIS — Z01.818 PREOPERATIVE EXAM FOR GYNECOLOGIC SURGERY: ICD-10-CM

## 2018-03-26 LAB
ABO + RH BLD: NORMAL
BLD GP AB SCN CELLS X3 SERPL QL: NORMAL

## 2018-03-26 PROCEDURE — 36415 COLL VENOUS BLD VENIPUNCTURE: CPT

## 2018-03-26 PROCEDURE — 86901 BLOOD TYPING SEROLOGIC RH(D): CPT

## 2018-03-27 ENCOUNTER — HOSPITAL ENCOUNTER (OUTPATIENT)
Facility: OTHER | Age: 38
Discharge: HOME | End: 2018-03-29
Attending: OBSTETRICS & GYNECOLOGY | Admitting: OBSTETRICS & GYNECOLOGY
Payer: COMMERCIAL

## 2018-03-27 ENCOUNTER — ANESTHESIA (OUTPATIENT)
Dept: SURGERY | Facility: OTHER | Age: 38
End: 2018-03-27
Payer: COMMERCIAL

## 2018-03-27 ENCOUNTER — SURGERY (OUTPATIENT)
Age: 38
End: 2018-03-27

## 2018-03-27 DIAGNOSIS — D25.1 FIBROIDS, INTRAMURAL: ICD-10-CM

## 2018-03-27 DIAGNOSIS — D25.0 INTRAMURAL, SUBMUCOUS, AND SUBSEROUS LEIOMYOMA OF UTERUS: ICD-10-CM

## 2018-03-27 DIAGNOSIS — Z98.890 S/P MYOMECTOMY: Primary | ICD-10-CM

## 2018-03-27 DIAGNOSIS — D25.2 INTRAMURAL, SUBMUCOUS, AND SUBSEROUS LEIOMYOMA OF UTERUS: ICD-10-CM

## 2018-03-27 DIAGNOSIS — D25.1 INTRAMURAL, SUBMUCOUS, AND SUBSEROUS LEIOMYOMA OF UTERUS: ICD-10-CM

## 2018-03-27 PROBLEM — I10 HYPERTENSION: Status: ACTIVE | Noted: 2018-03-27

## 2018-03-27 LAB
B-HCG UR QL: NEGATIVE
CTP QC/QA: YES
POCT GLUCOSE: 81 MG/DL (ref 70–110)

## 2018-03-27 PROCEDURE — 25000003 PHARM REV CODE 250: Performed by: ANESTHESIOLOGY

## 2018-03-27 PROCEDURE — 36000707: Performed by: OBSTETRICS & GYNECOLOGY

## 2018-03-27 PROCEDURE — 25000003 PHARM REV CODE 250: Performed by: OBSTETRICS & GYNECOLOGY

## 2018-03-27 PROCEDURE — 63600175 PHARM REV CODE 636 W HCPCS: Performed by: STUDENT IN AN ORGANIZED HEALTH CARE EDUCATION/TRAINING PROGRAM

## 2018-03-27 PROCEDURE — 63600175 PHARM REV CODE 636 W HCPCS: Performed by: OBSTETRICS & GYNECOLOGY

## 2018-03-27 PROCEDURE — 37000009 HC ANESTHESIA EA ADD 15 MINS: Performed by: OBSTETRICS & GYNECOLOGY

## 2018-03-27 PROCEDURE — 88305 TISSUE EXAM BY PATHOLOGIST: CPT | Mod: 26,,, | Performed by: PATHOLOGY

## 2018-03-27 PROCEDURE — 81025 URINE PREGNANCY TEST: CPT | Performed by: ANESTHESIOLOGY

## 2018-03-27 PROCEDURE — 94799 UNLISTED PULMONARY SVC/PX: CPT

## 2018-03-27 PROCEDURE — 71000033 HC RECOVERY, INTIAL HOUR: Performed by: OBSTETRICS & GYNECOLOGY

## 2018-03-27 PROCEDURE — 36000706: Performed by: OBSTETRICS & GYNECOLOGY

## 2018-03-27 PROCEDURE — 58146 MYOMECTOMY ABDOM COMPLEX: CPT | Mod: ,,, | Performed by: OBSTETRICS & GYNECOLOGY

## 2018-03-27 PROCEDURE — 37000008 HC ANESTHESIA 1ST 15 MINUTES: Performed by: OBSTETRICS & GYNECOLOGY

## 2018-03-27 PROCEDURE — 25000003 PHARM REV CODE 250: Performed by: STUDENT IN AN ORGANIZED HEALTH CARE EDUCATION/TRAINING PROGRAM

## 2018-03-27 PROCEDURE — 25000003 PHARM REV CODE 250: Performed by: NURSE ANESTHETIST, CERTIFIED REGISTERED

## 2018-03-27 PROCEDURE — 27201423 OPTIME MED/SURG SUP & DEVICES STERILE SUPPLY: Performed by: OBSTETRICS & GYNECOLOGY

## 2018-03-27 PROCEDURE — 94761 N-INVAS EAR/PLS OXIMETRY MLT: CPT

## 2018-03-27 PROCEDURE — 63600175 PHARM REV CODE 636 W HCPCS: Performed by: NURSE ANESTHETIST, CERTIFIED REGISTERED

## 2018-03-27 PROCEDURE — 63600175 PHARM REV CODE 636 W HCPCS: Performed by: ANESTHESIOLOGY

## 2018-03-27 PROCEDURE — 82962 GLUCOSE BLOOD TEST: CPT | Performed by: OBSTETRICS & GYNECOLOGY

## 2018-03-27 PROCEDURE — 71000039 HC RECOVERY, EACH ADD'L HOUR: Performed by: OBSTETRICS & GYNECOLOGY

## 2018-03-27 PROCEDURE — 88305 TISSUE EXAM BY PATHOLOGIST: CPT | Performed by: PATHOLOGY

## 2018-03-27 RX ORDER — SODIUM CHLORIDE, SODIUM LACTATE, POTASSIUM CHLORIDE, CALCIUM CHLORIDE 600; 310; 30; 20 MG/100ML; MG/100ML; MG/100ML; MG/100ML
INJECTION, SOLUTION INTRAVENOUS CONTINUOUS
Status: DISCONTINUED | OUTPATIENT
Start: 2018-03-27 | End: 2018-03-27

## 2018-03-27 RX ORDER — PREGABALIN 75 MG/1
75 CAPSULE ORAL ONCE
Status: DISCONTINUED | OUTPATIENT
Start: 2018-03-27 | End: 2018-03-27 | Stop reason: SDUPTHER

## 2018-03-27 RX ORDER — OXYCODONE AND ACETAMINOPHEN 10; 325 MG/1; MG/1
1 TABLET ORAL EVERY 4 HOURS PRN
Status: DISCONTINUED | OUTPATIENT
Start: 2018-03-27 | End: 2018-03-29 | Stop reason: HOSPADM

## 2018-03-27 RX ORDER — ONDANSETRON 2 MG/ML
INJECTION INTRAMUSCULAR; INTRAVENOUS
Status: DISCONTINUED | OUTPATIENT
Start: 2018-03-27 | End: 2018-03-27

## 2018-03-27 RX ORDER — SODIUM CHLORIDE 9 MG/ML
INJECTION, SOLUTION INTRAVENOUS CONTINUOUS
Status: DISCONTINUED | OUTPATIENT
Start: 2018-03-27 | End: 2018-03-27

## 2018-03-27 RX ORDER — CEFAZOLIN SODIUM 1 G/3ML
2 INJECTION, POWDER, FOR SOLUTION INTRAMUSCULAR; INTRAVENOUS
Status: COMPLETED | OUTPATIENT
Start: 2018-03-27 | End: 2018-03-27

## 2018-03-27 RX ORDER — PREGABALIN 75 MG/1
75 CAPSULE ORAL ONCE
Status: COMPLETED | OUTPATIENT
Start: 2018-03-27 | End: 2018-03-27

## 2018-03-27 RX ORDER — GLYCOPYRROLATE 0.2 MG/ML
INJECTION INTRAMUSCULAR; INTRAVENOUS
Status: DISCONTINUED | OUTPATIENT
Start: 2018-03-27 | End: 2018-03-27

## 2018-03-27 RX ORDER — FERROUS SULFATE 325(65) MG
325 TABLET, DELAYED RELEASE (ENTERIC COATED) ORAL
COMMUNITY
End: 2019-10-10

## 2018-03-27 RX ORDER — LIDOCAINE HCL/PF 100 MG/5ML
SYRINGE (ML) INTRAVENOUS
Status: DISCONTINUED | OUTPATIENT
Start: 2018-03-27 | End: 2018-03-27

## 2018-03-27 RX ORDER — METOCLOPRAMIDE HYDROCHLORIDE 5 MG/ML
5 INJECTION INTRAMUSCULAR; INTRAVENOUS EVERY 6 HOURS PRN
Status: DISCONTINUED | OUTPATIENT
Start: 2018-03-27 | End: 2018-03-29 | Stop reason: HOSPADM

## 2018-03-27 RX ORDER — ONDANSETRON 8 MG/1
8 TABLET, ORALLY DISINTEGRATING ORAL EVERY 8 HOURS PRN
Status: DISCONTINUED | OUTPATIENT
Start: 2018-03-27 | End: 2018-03-29 | Stop reason: HOSPADM

## 2018-03-27 RX ORDER — MIDAZOLAM HYDROCHLORIDE 5 MG/ML
4 INJECTION INTRAMUSCULAR; INTRAVENOUS
Status: DISCONTINUED | OUTPATIENT
Start: 2018-03-27 | End: 2018-03-27 | Stop reason: HOSPADM

## 2018-03-27 RX ORDER — DIPHENHYDRAMINE HCL 25 MG
25 CAPSULE ORAL EVERY 4 HOURS PRN
Status: DISCONTINUED | OUTPATIENT
Start: 2018-03-27 | End: 2018-03-29 | Stop reason: HOSPADM

## 2018-03-27 RX ORDER — LIDOCAINE HYDROCHLORIDE 10 MG/ML
0.5 INJECTION, SOLUTION EPIDURAL; INFILTRATION; INTRACAUDAL; PERINEURAL ONCE
Status: DISCONTINUED | OUTPATIENT
Start: 2018-03-27 | End: 2018-03-27 | Stop reason: SDUPTHER

## 2018-03-27 RX ORDER — KETOROLAC TROMETHAMINE 30 MG/ML
INJECTION, SOLUTION INTRAMUSCULAR; INTRAVENOUS
Status: DISCONTINUED | OUTPATIENT
Start: 2018-03-27 | End: 2018-03-27

## 2018-03-27 RX ORDER — MEPERIDINE HYDROCHLORIDE 50 MG/ML
25 INJECTION INTRAMUSCULAR; INTRAVENOUS; SUBCUTANEOUS
Status: DISCONTINUED | OUTPATIENT
Start: 2018-03-27 | End: 2018-03-29 | Stop reason: HOSPADM

## 2018-03-27 RX ORDER — OXYCODONE AND ACETAMINOPHEN 5; 325 MG/1; MG/1
1 TABLET ORAL EVERY 4 HOURS PRN
Status: DISCONTINUED | OUTPATIENT
Start: 2018-03-27 | End: 2018-03-29 | Stop reason: HOSPADM

## 2018-03-27 RX ORDER — HYDROMORPHONE HYDROCHLORIDE 2 MG/ML
0.4 INJECTION, SOLUTION INTRAMUSCULAR; INTRAVENOUS; SUBCUTANEOUS EVERY 5 MIN PRN
Status: DISCONTINUED | OUTPATIENT
Start: 2018-03-27 | End: 2018-03-27 | Stop reason: HOSPADM

## 2018-03-27 RX ORDER — MEPERIDINE HYDROCHLORIDE 50 MG/ML
12.5 INJECTION INTRAMUSCULAR; INTRAVENOUS; SUBCUTANEOUS ONCE AS NEEDED
Status: DISCONTINUED | OUTPATIENT
Start: 2018-03-27 | End: 2018-03-27 | Stop reason: HOSPADM

## 2018-03-27 RX ORDER — ONDANSETRON 2 MG/ML
4 INJECTION INTRAMUSCULAR; INTRAVENOUS DAILY PRN
Status: DISCONTINUED | OUTPATIENT
Start: 2018-03-27 | End: 2018-03-27 | Stop reason: HOSPADM

## 2018-03-27 RX ORDER — OXYCODONE HYDROCHLORIDE 5 MG/1
5 TABLET ORAL ONCE AS NEEDED
Status: DISCONTINUED | OUTPATIENT
Start: 2018-03-27 | End: 2018-03-27 | Stop reason: HOSPADM

## 2018-03-27 RX ORDER — ACETAMINOPHEN 10 MG/ML
INJECTION, SOLUTION INTRAVENOUS
Status: DISCONTINUED | OUTPATIENT
Start: 2018-03-27 | End: 2018-03-27

## 2018-03-27 RX ORDER — SIMETHICONE 80 MG
80 TABLET,CHEWABLE ORAL EVERY 4 HOURS PRN
Status: DISCONTINUED | OUTPATIENT
Start: 2018-03-27 | End: 2018-03-29 | Stop reason: HOSPADM

## 2018-03-27 RX ORDER — FAMOTIDINE 20 MG/1
20 TABLET, FILM COATED ORAL
Status: COMPLETED | OUTPATIENT
Start: 2018-03-27 | End: 2018-03-27

## 2018-03-27 RX ORDER — FENTANYL CITRATE 50 UG/ML
INJECTION, SOLUTION INTRAMUSCULAR; INTRAVENOUS
Status: DISCONTINUED | OUTPATIENT
Start: 2018-03-27 | End: 2018-03-27

## 2018-03-27 RX ORDER — FENTANYL CITRATE 50 UG/ML
25 INJECTION, SOLUTION INTRAMUSCULAR; INTRAVENOUS EVERY 5 MIN PRN
Status: DISCONTINUED | OUTPATIENT
Start: 2018-03-27 | End: 2018-03-27 | Stop reason: HOSPADM

## 2018-03-27 RX ORDER — LABETALOL 100 MG/1
100 TABLET, FILM COATED ORAL EVERY 12 HOURS
Status: DISCONTINUED | OUTPATIENT
Start: 2018-03-27 | End: 2018-03-29 | Stop reason: HOSPADM

## 2018-03-27 RX ORDER — ROCURONIUM BROMIDE 10 MG/ML
INJECTION, SOLUTION INTRAVENOUS
Status: DISCONTINUED | OUTPATIENT
Start: 2018-03-27 | End: 2018-03-27

## 2018-03-27 RX ORDER — BUPIVACAINE HYDROCHLORIDE 2.5 MG/ML
INJECTION, SOLUTION EPIDURAL; INFILTRATION; INTRACAUDAL
Status: DISCONTINUED | OUTPATIENT
Start: 2018-03-27 | End: 2018-03-27 | Stop reason: HOSPADM

## 2018-03-27 RX ORDER — IBUPROFEN 400 MG/1
800 TABLET ORAL EVERY 8 HOURS
Status: DISCONTINUED | OUTPATIENT
Start: 2018-03-28 | End: 2018-03-29 | Stop reason: HOSPADM

## 2018-03-27 RX ORDER — KETOROLAC TROMETHAMINE 30 MG/ML
30 INJECTION, SOLUTION INTRAMUSCULAR; INTRAVENOUS EVERY 6 HOURS
Status: COMPLETED | OUTPATIENT
Start: 2018-03-27 | End: 2018-03-28

## 2018-03-27 RX ORDER — VASOPRESSIN 20 [USP'U]/ML
INJECTION, SOLUTION INTRAMUSCULAR; SUBCUTANEOUS
Status: DISCONTINUED | OUTPATIENT
Start: 2018-03-27 | End: 2018-03-27 | Stop reason: HOSPADM

## 2018-03-27 RX ORDER — OXYCODONE HYDROCHLORIDE 5 MG/1
5 TABLET ORAL
Status: DISCONTINUED | OUTPATIENT
Start: 2018-03-27 | End: 2018-03-27 | Stop reason: HOSPADM

## 2018-03-27 RX ORDER — SODIUM CHLORIDE 0.9 % (FLUSH) 0.9 %
3 SYRINGE (ML) INJECTION
Status: DISCONTINUED | OUTPATIENT
Start: 2018-03-27 | End: 2018-03-27

## 2018-03-27 RX ORDER — NEOSTIGMINE METHYLSULFATE 1 MG/ML
INJECTION, SOLUTION INTRAVENOUS
Status: DISCONTINUED | OUTPATIENT
Start: 2018-03-27 | End: 2018-03-27

## 2018-03-27 RX ORDER — LIDOCAINE HYDROCHLORIDE 10 MG/ML
0.5 INJECTION, SOLUTION EPIDURAL; INFILTRATION; INTRACAUDAL; PERINEURAL ONCE
Status: DISCONTINUED | OUTPATIENT
Start: 2018-03-27 | End: 2018-03-27 | Stop reason: HOSPADM

## 2018-03-27 RX ORDER — SODIUM CHLORIDE, SODIUM LACTATE, POTASSIUM CHLORIDE, CALCIUM CHLORIDE 600; 310; 30; 20 MG/100ML; MG/100ML; MG/100ML; MG/100ML
INJECTION, SOLUTION INTRAVENOUS CONTINUOUS
Status: DISCONTINUED | OUTPATIENT
Start: 2018-03-27 | End: 2018-03-29 | Stop reason: HOSPADM

## 2018-03-27 RX ORDER — PROPOFOL 10 MG/ML
VIAL (ML) INTRAVENOUS
Status: DISCONTINUED | OUTPATIENT
Start: 2018-03-27 | End: 2018-03-27

## 2018-03-27 RX ADMIN — ONDANSETRON 4 MG: 2 INJECTION INTRAMUSCULAR; INTRAVENOUS at 12:03

## 2018-03-27 RX ADMIN — HYDROMORPHONE HYDROCHLORIDE 0.4 MG: 2 INJECTION, SOLUTION INTRAMUSCULAR; INTRAVENOUS; SUBCUTANEOUS at 12:03

## 2018-03-27 RX ADMIN — OXYCODONE HYDROCHLORIDE 5 MG: 5 TABLET ORAL at 12:03

## 2018-03-27 RX ADMIN — KETOROLAC TROMETHAMINE 30 MG: 30 INJECTION, SOLUTION INTRAMUSCULAR; INTRAVENOUS at 05:03

## 2018-03-27 RX ADMIN — PROPOFOL 20 MG: 10 INJECTION, EMULSION INTRAVENOUS at 12:03

## 2018-03-27 RX ADMIN — SODIUM CHLORIDE, POTASSIUM CHLORIDE, SODIUM LACTATE AND CALCIUM CHLORIDE: 600; 310; 30; 20 INJECTION, SOLUTION INTRAVENOUS at 02:03

## 2018-03-27 RX ADMIN — LIDOCAINE HYDROCHLORIDE 75 MG: 20 INJECTION, SOLUTION INTRAVENOUS at 10:03

## 2018-03-27 RX ADMIN — ACETAMINOPHEN 1000 MG: 10 INJECTION, SOLUTION INTRAVENOUS at 10:03

## 2018-03-27 RX ADMIN — KETOROLAC TROMETHAMINE 30 MG: 30 INJECTION, SOLUTION INTRAMUSCULAR; INTRAVENOUS at 12:03

## 2018-03-27 RX ADMIN — BUPIVACAINE HYDROCHLORIDE 10 ML: 2.5 INJECTION, SOLUTION EPIDURAL; INFILTRATION; INTRACAUDAL; PERINEURAL at 02:03

## 2018-03-27 RX ADMIN — PREGABALIN 75 MG: 75 CAPSULE ORAL at 08:03

## 2018-03-27 RX ADMIN — KETOROLAC TROMETHAMINE 30 MG: 30 INJECTION, SOLUTION INTRAMUSCULAR; INTRAVENOUS at 11:03

## 2018-03-27 RX ADMIN — OXYCODONE HYDROCHLORIDE AND ACETAMINOPHEN 1 TABLET: 5; 325 TABLET ORAL at 09:03

## 2018-03-27 RX ADMIN — FENTANYL CITRATE 100 MCG: 50 INJECTION, SOLUTION INTRAMUSCULAR; INTRAVENOUS at 11:03

## 2018-03-27 RX ADMIN — HYDROMORPHONE HYDROCHLORIDE 0.4 MG: 2 INJECTION, SOLUTION INTRAMUSCULAR; INTRAVENOUS; SUBCUTANEOUS at 01:03

## 2018-03-27 RX ADMIN — PROPOFOL 180 MG: 10 INJECTION, EMULSION INTRAVENOUS at 10:03

## 2018-03-27 RX ADMIN — SODIUM CHLORIDE, SODIUM GLUCONATE, SODIUM ACETATE, POTASSIUM CHLORIDE, MAGNESIUM CHLORIDE, SODIUM PHOSPHATE, DIBASIC, AND POTASSIUM PHOSPHATE: .53; .5; .37; .037; .03; .012; .00082 INJECTION, SOLUTION INTRAVENOUS at 11:03

## 2018-03-27 RX ADMIN — LIDOCAINE HYDROCHLORIDE 25 MG: 20 INJECTION, SOLUTION INTRAVENOUS at 12:03

## 2018-03-27 RX ADMIN — VASOPRESSIN 20 UNITS: 20 INJECTION INTRAVENOUS at 10:03

## 2018-03-27 RX ADMIN — FAMOTIDINE 20 MG: 20 TABLET, FILM COATED ORAL at 08:03

## 2018-03-27 RX ADMIN — SODIUM CHLORIDE, SODIUM LACTATE, POTASSIUM CHLORIDE, AND CALCIUM CHLORIDE: 600; 310; 30; 20 INJECTION, SOLUTION INTRAVENOUS at 09:03

## 2018-03-27 RX ADMIN — CARBOXYMETHYLCELLULOSE SODIUM 2 DROP: 2.5 SOLUTION/ DROPS OPHTHALMIC at 10:03

## 2018-03-27 RX ADMIN — CEFAZOLIN 2 G: 330 INJECTION, POWDER, FOR SOLUTION INTRAMUSCULAR; INTRAVENOUS at 10:03

## 2018-03-27 RX ADMIN — METOCLOPRAMIDE 5 MG: 5 INJECTION, SOLUTION INTRAMUSCULAR; INTRAVENOUS at 05:03

## 2018-03-27 RX ADMIN — FENTANYL CITRATE 100 MCG: 50 INJECTION, SOLUTION INTRAMUSCULAR; INTRAVENOUS at 10:03

## 2018-03-27 RX ADMIN — LABETALOL HYDROCHLORIDE 100 MG: 100 TABLET, FILM COATED ORAL at 09:03

## 2018-03-27 RX ADMIN — MIDAZOLAM HYDROCHLORIDE 4 MG: 5 INJECTION, SOLUTION INTRAMUSCULAR; INTRAVENOUS at 08:03

## 2018-03-27 RX ADMIN — FENTANYL CITRATE 50 MCG: 50 INJECTION, SOLUTION INTRAMUSCULAR; INTRAVENOUS at 10:03

## 2018-03-27 RX ADMIN — ROCURONIUM BROMIDE 50 MG: 10 INJECTION, SOLUTION INTRAVENOUS at 10:03

## 2018-03-27 RX ADMIN — GLYCOPYRROLATE 0.8 MG: 0.2 INJECTION, SOLUTION INTRAMUSCULAR; INTRAVENOUS at 12:03

## 2018-03-27 RX ADMIN — NEOSTIGMINE METHYLSULFATE 5 MG: 1 INJECTION INTRAVENOUS at 12:03

## 2018-03-27 RX ADMIN — SODIUM CHLORIDE, POTASSIUM CHLORIDE, SODIUM LACTATE AND CALCIUM CHLORIDE: 600; 310; 30; 20 INJECTION, SOLUTION INTRAVENOUS at 09:03

## 2018-03-27 RX ADMIN — SIMETHICONE CHEW TAB 80 MG 80 MG: 80 TABLET ORAL at 05:03

## 2018-03-27 NOTE — OP NOTE
DATE: (date: 03/27/2018)    OPERATIVE REPORT    PREOPERATIVE DIAGNOSIS  1. Fibroids  2. Menorrhagia  3. Anemia  4. Obesity    POSTOPERATIVE DIAGNOSIS  1. 1. Fibroids  2. Menorrhagia  3. Anemia  4. Obesity    PROCEDURE:  1. Abdominal myomectomy (13 fibroids)    SURGEON: Dr. Sarah Hammonds    ASSISTANT: Dr. Deysi Nicole    ANESTHESIA: General    COMPLICATIONS: None    EBL: 700 cc    IV FLUIDS: 1500 cc    URINE OUTPUT: 650 cc    FINDINGS: Enlarged uterus with multiple fibroids (2 large posterior fibroids, large fundal posterior fibroid, small anterior fibroid, 2 small right posterior fibroids, other small fibroids intramural).  Normal tubes and ovaries.  Patient is not a candidate for a vaginal delivery    PROCEDURE: Patient was taking to the operating room where general anesthesia was administered and found to be adequate.  She was prepped and draped in the dorsal supine position.  A weighted sterile speculum was placed in the vagina.  A monreal catheter was inserted into the bladder.    Gloves were changed.  A Pfannenstiel skin incision was made with the scalpel and carried down to the underlying layer of fascia with the scalpel.  The incision was extended laterally with William scissors.  The superior aspect of the incision was grasped with the [facility] clamps, tented up and the underlying muscles were dissected off bluntly.  Attention was turned to the inferior aspect of the incision.  The underlying muscles were dissected off bluntly.  The rectus muscles were  in the midline.  The peritoneum was entered in digitally.  The incision was extended with finger fracture.  All laparotomy sponges were removed from the surgical field.     Uterus was palpated.  Multiple large fibroids were noted.  The uterus was unable to be exteriorized.  All fibroids were removed in the following fashion starting with a large posterior, right fundal fibroid: 20 Units of Pitressin diluted in 100cc of Normal saline was injected into  the serosa overlying the fibroid.  An incision was made with a Bovie.  This incision was carried down to the fibroid with the Bovie.  The fibroid was grasped with a perforating towel clamp.  Using the Harmonic Focus, the fibroid was enucleated from the underlying myometrium.  Hemostasis was maintained utilizing the focus.  Once the fibroid was completely enucleated, it was handed to the waiting surgical nurse.  The deepest layer of myometrium was reapproximated with 2-0 PDS in a running, locked fashion.  The next layer of myometrium was reapproximated with 2-0 Vicryl in a running, locked fashion.  The serosa was reapproximated with 3-0 Monocryl in a baseball stitch fashion.  Excellent hemostasis was noted.  Once the large fibroids were removed, the uterus was able to be delivered through the incision.  A red rubber catheter was placed around the cervix at the level of the internal os. The other fibroids were removed as described.  The red rubber catheter was removed.      The uterus was copiously irrigated.  Excellent hemostasis was noted.  A sheet of seprafilm was placed inside the abdominal cavity.  The peritoneum was reapproximated with 2-0 Vicryl in a running fashion.  The muscle was reapproximated with 2-0 Vicryl.  The fascia was reapproximated with 1-0 PDS in a running fashion.  The subcutaneous tissue was reapproximated with 2-0 Vicryl in a running fashion.  The skin was closed with 4-0 Monocryl in a subcuticular fashion.  Sponge, lap, and needle counts were correct x 2.  The patient was taken to the recovery room in stable condition with the monreal draining clear urine.        Sarah Hammonds MD, FACOG  Obstetrics and Gynecology

## 2018-03-27 NOTE — TRANSFER OF CARE
"Anesthesia Transfer of Care Note    Patient: Brianne Blas    Procedure(s) Performed: Procedure(s) (LRB):  MYOMECTOMY - OPEN (N/A)    Patient location: PACU    Anesthesia Type: general    Transport from OR: Transported from OR on 2-3 L/min O2 by NC with adequate spontaneous ventilation. Continuous SpO2 monitoring in transport    Post pain: adequate analgesia    Post assessment: no apparent anesthetic complications    Post vital signs: stable    Level of consciousness: awake    Nausea/Vomiting: no nausea/vomiting    Complications: none    Transfer of care protocol was followed      Last vitals:   Visit Vitals  BP (!) 170/87 (BP Location: Right arm, Patient Position: Sitting)   Pulse 67   Temp 36.8 °C (98.2 °F) (Oral)   Resp 20   Ht 5' 7" (1.702 m)   Wt 108.9 kg (240 lb)   LMP 03/23/2018 (Exact Date)   SpO2 99%   Breastfeeding? No   BMI 37.59 kg/m²     "

## 2018-03-27 NOTE — BRIEF OP NOTE
[facility]  Brief Operative Note    SUMMARY     Surgery Date: 3/27/2018     Surgeon(s) and Role:     * Sarah Hammonds MD - Primary    Assisting Surgeon: Deysi Nicole MD - Resident PGY3    Pre-op Diagnosis:    1. Intramural, submucous, and subserous leiomyoma of uterus [D25.1, D25.0, D25.2]  2. Anemia  3. Menorrhagia  4. Obesity    Post-op Diagnosis:  Post-Op Diagnosis Codes:   1. Intramural, submucous, and subserous leiomyoma of uterus [D25.1, D25.0, D25.2]  2. Anemia  3. Menorrhagia  4. Obesity      Procedure(s) (LRB):  MYOMECTOMY - OPEN (N/A)    Anesthesia: General    Description of the findings of the procedure:   - Enlarged fibroid uterus with several subserosal and intramural fibroids noted and removed, 13 in total. Fibroids ranging in size from 8 cm to 1 cm.  - Normal bilateral fallopian tubes and ovaries.    Estimated Blood Loss: 700 mL         Specimens:   Specimen (12h ago through future)    Start     Ordered    03/27/18 1204  Specimen to Pathology - Surgery  Once     Comments:  1/ Uterine fibroids (13)      03/27/18 1204    03/27/18 1158  Specimen to Pathology - Surgery  Once      03/27/18 1158        Deysi Nicole MD  OBGYN - PGY 3  Pager: [phone]     Attending statement    I was present and scrubbed for the entire surgical procedure    Sarah Hammonds MD, FACOG  Obstetrics and Gynecology

## 2018-03-27 NOTE — ANESTHESIA POSTPROCEDURE EVALUATION
"Anesthesia Post Evaluation    Patient: Brianne Blas    Procedure(s) Performed: Procedure(s) (LRB):  MYOMECTOMY - OPEN (N/A)    Final Anesthesia Type: general  Patient location during evaluation: PACU  Patient participation: Yes- Able to Participate  Level of consciousness: awake and alert  Post-procedure vital signs: reviewed and stable  Pain management: adequate  Airway patency: patent  PONV status at discharge: No PONV  Anesthetic complications: no      Cardiovascular status: blood pressure returned to baseline  Respiratory status: unassisted and spontaneous ventilation  Hydration status: euvolemic  Follow-up not needed.        Visit Vitals  BP (!) 120/59   Pulse 70   Temp 36.1 °C (97 °F)   Resp 14   Ht 5' 7" (1.702 m)   Wt 108.9 kg (240 lb)   LMP 03/23/2018 (Exact Date)   SpO2 (!) 94%   Breastfeeding? No   BMI 37.59 kg/m²       Pain/Dayna Score: Pain Assessment Performed: Yes (3/27/2018  1:28 PM)  Presence of Pain: complains of pain/discomfort (tolerable) (3/27/2018  1:28 PM)  Pain Rating Prior to Med Admin: 6 (3/27/2018  1:26 PM)  Pain Rating Post Med Admin: 5 (3/27/2018  1:28 PM)  Dayna Score: 10 (3/27/2018  1:28 PM)      "

## 2018-03-27 NOTE — PLAN OF CARE
Patient free of trauma, falls, and injury, no skin breakdown. Vital signs stable and afebrile throughout shift. Lap sites to abdomen are clean, dry, and intact. Pain has been moderately controlled by scheduled pain medication, well tolerated. Medicated PRN for gas and nausea. No complaints of pain at this time. Monreal catheter patent, maintained to gravity, stat locked to leg, monreal care completed this shift. Call light is within reach, bed alarm on, bed in lowest position with brakes on, side rails up x2, family at bedside, TEDs/SCDs on, IS at bedside, and nonskid socks on. Pt lying in bed in no distress.

## 2018-03-27 NOTE — PROGRESS NOTES
Post-Op Note    S: Patient is doing well post-operatively. Pain is well controlled. She is tolerating liquid diet without n/v. No vaginal bleeding. Has not yet ambulated - Zuluaga still in place.     O:  Temp:  [97 °F (36.1 °C)-98.6 °F (37 °C)] 97 °F (36.1 °C)  Pulse:  [52-70] 54  Resp:  [14-20] 16  SpO2:  [94 %-100 %] 96 %  BP: (115-170)/(59-87) 133/72    Gen: Patient sitting up in bed, in no distress.   Abd: Dressing in place, dry. Abdomen soft, appropriately tender.   Pad counts: No vaginal bleeding.    UOP: 750mL since 7am    A/P:  Continue routine management and post-op advances  ADAT  Continue AS  Zuluaga to be removed in AM  Discharge home tomorrow after spontaneous void trial and seen by staff    Slim Mckinney M.D.  Obstetrics & Gynecology  PGY-1

## 2018-03-27 NOTE — PLAN OF CARE
Problem: Patient Care Overview  Goal: Plan of Care Review  Outcome: Ongoing (interventions implemented as appropriate)  PT on RA. Sats 96%.  No distress noted.

## 2018-03-28 PROCEDURE — 25000003 PHARM REV CODE 250: Performed by: STUDENT IN AN ORGANIZED HEALTH CARE EDUCATION/TRAINING PROGRAM

## 2018-03-28 PROCEDURE — 99024 POSTOP FOLLOW-UP VISIT: CPT | Mod: ,,, | Performed by: OBSTETRICS & GYNECOLOGY

## 2018-03-28 PROCEDURE — 63600175 PHARM REV CODE 636 W HCPCS: Performed by: STUDENT IN AN ORGANIZED HEALTH CARE EDUCATION/TRAINING PROGRAM

## 2018-03-28 PROCEDURE — 99900035 HC TECH TIME PER 15 MIN (STAT)

## 2018-03-28 PROCEDURE — 94799 UNLISTED PULMONARY SVC/PX: CPT

## 2018-03-28 PROCEDURE — 94761 N-INVAS EAR/PLS OXIMETRY MLT: CPT

## 2018-03-28 RX ORDER — IBUPROFEN 800 MG/1
800 TABLET ORAL EVERY 8 HOURS
Qty: 40 TABLET | Refills: 0 | Status: SHIPPED | OUTPATIENT
Start: 2018-03-28 | End: 2018-04-08 | Stop reason: SDUPTHER

## 2018-03-28 RX ORDER — OXYCODONE AND ACETAMINOPHEN 5; 325 MG/1; MG/1
1 TABLET ORAL EVERY 4 HOURS PRN
Qty: 30 TABLET | Refills: 0 | Status: SHIPPED | OUTPATIENT
Start: 2018-03-28 | End: 2018-06-12

## 2018-03-28 RX ADMIN — KETOROLAC TROMETHAMINE 30 MG: 30 INJECTION, SOLUTION INTRAMUSCULAR; INTRAVENOUS at 11:03

## 2018-03-28 RX ADMIN — LABETALOL HYDROCHLORIDE 100 MG: 100 TABLET, FILM COATED ORAL at 09:03

## 2018-03-28 RX ADMIN — OXYCODONE HYDROCHLORIDE AND ACETAMINOPHEN 1 TABLET: 10; 325 TABLET ORAL at 09:03

## 2018-03-28 RX ADMIN — SIMETHICONE CHEW TAB 80 MG 80 MG: 80 TABLET ORAL at 06:03

## 2018-03-28 RX ADMIN — IBUPROFEN 800 MG: 400 TABLET, FILM COATED ORAL at 05:03

## 2018-03-28 RX ADMIN — OXYCODONE HYDROCHLORIDE AND ACETAMINOPHEN 1 TABLET: 5; 325 TABLET ORAL at 12:03

## 2018-03-28 RX ADMIN — MEPERIDINE HYDROCHLORIDE 25 MG: 50 INJECTION INTRAMUSCULAR; INTRAVENOUS; SUBCUTANEOUS at 12:03

## 2018-03-28 RX ADMIN — KETOROLAC TROMETHAMINE 30 MG: 30 INJECTION, SOLUTION INTRAMUSCULAR; INTRAVENOUS at 06:03

## 2018-03-28 RX ADMIN — OXYCODONE HYDROCHLORIDE AND ACETAMINOPHEN 1 TABLET: 10; 325 TABLET ORAL at 02:03

## 2018-03-28 NOTE — ASSESSMENT & PLAN NOTE
POD #1  - Continue routine management and advances  - Pain controlled with PO medications  - Remove monreal catheter this morning; spontaneous void trial  - UOP > 120mL/hour overnight  - BP: (107-170)/(53-88) 107/53  - HR 68  - Encourage PO hydration   - Encourage ambulation   - Monitor VS and symptoms of anemia, and will order post-op CBC if feeling of weakness persists  Plan is for discharge after symptoms resolve and patient voids spontaneously

## 2018-03-28 NOTE — SUBJECTIVE & OBJECTIVE
"Interval History:   03/28/2018 - POD #1 s/p abdominal myomectomy. Patient initially reporting doing well overnight. Pain was controlled (required IV meperidine x 1, otherwise PO medications only), she tolerated regular diet without n/v. Reports she slept well. Later this AM c/o "feeling really weak and tired." Thorough PE revealed no concerning findings. BP: (107-170)/(53-88) 107/53, HR 68 at bedside. UOP >120mL/hr overnight. Encouraged PO hydration. Zuluaga still in place, to be removed this AM. Will reassess after ambulation and get CBC if symptoms persist.     Scheduled Meds:   ketorolac  30 mg Intravenous Q6H    Followed by    ibuprofen  800 mg Oral Q8H    labetalol  100 mg Oral Q12H     Continuous Infusions:   lactated ringers 125 mL/hr at 03/27/18 2157     PRN Meds:diphenhydrAMINE, meperidine, metoclopramide HCl, ondansetron, oxyCODONE-acetaminophen, oxyCODONE-acetaminophen, simethicone    Review of patient's allergies indicates:  No Known Allergies    Objective:     Vital Signs (Most Recent):  Temp: 98.8 °F (37.1 °C) (03/28/18 0617)  Pulse: 68 (03/28/18 0617)  Resp: 16 (03/28/18 0617)  BP: (!) 107/53 (03/28/18 0617)  SpO2: 96 % (03/28/18 0617) Vital Signs (24h Range):  Temp:  [97 °F (36.1 °C)-100.3 °F (37.9 °C)] 98.8 °F (37.1 °C)  Pulse:  [52-75] 68  Resp:  [14-20] 16  SpO2:  [94 %-100 %] 96 %  BP: (107-170)/(53-88) 107/53     Weight: 109 kg (240 lb 4.8 oz)  Body mass index is 37.64 kg/m².  Patient's last menstrual period was 03/23/2018 (exact date).    I&O (Last 24H):    Intake/Output Summary (Last 24 hours) at 03/28/18 0654  Last data filed at 03/28/18 0600   Gross per 24 hour   Intake             2100 ml   Output             2600 ml   Net             -500 ml       Physical Exam:   Constitutional: She is oriented to person, place, and time. She appears well-developed and well-nourished. No distress.    HENT:   Head: Normocephalic and atraumatic.   Mouth/Throat: Oropharynx is clear and moist.    Eyes: " Conjunctivae and EOM are normal.    Neck: Neck supple.    Cardiovascular: Normal rate, regular rhythm and normal heart sounds.  Exam reveals no edema.     Pulmonary/Chest: Effort normal and breath sounds normal. No respiratory distress.        Abdominal: Soft. Bowel sounds are normal. She exhibits abdominal incision (clean and dry low transverse incision with dressing in place.). She exhibits no distension. There is tenderness (mild, diffuse). There is no rebound and no guarding.     Genitourinary: Vagina normal.   Genitourinary Comments: No blood on maxipad.           Musculoskeletal: Normal range of motion.       Neurological: She is alert and oriented to person, place, and time.    Skin: Skin is warm and dry. She is not diaphoretic.    Psychiatric: She has a normal mood and affect. Her behavior is normal. Judgment and thought content normal.       Laboratory:  CBC:   Lab Results   Component Value Date    WBC 5.72 03/08/2018    HGB 10.1 (L) 03/08/2018    HCT 32.6 (L) 03/08/2018    MCV 86 03/08/2018     03/08/2018

## 2018-03-28 NOTE — PLAN OF CARE
Problem: Patient Care Overview  Goal: Plan of Care Review  Outcome: Ongoing (interventions implemented as appropriate)  Patient had an uneventful night, no falls, no skin breakdown, no trauma.  at bedside. BP elevated at one point, given IV pain med. BP better, Resident aware. Adequate urine output. VSS. Purposeful hourly rounding done throughout the shift.

## 2018-03-28 NOTE — PLAN OF CARE
Problem: Patient Care Overview  Goal: Plan of Care Review  Pt currently on  RA . Pt in no distress at this time.  Will continue to monitor.

## 2018-03-28 NOTE — HOSPITAL COURSE
"03/28/2018 - POD #1 s/p abdominal myomectomy. Patient initially reporting doing well overnight. Pain was controlled (required IV meperidine x 1, otherwise PO medications only), she tolerated regular diet without n/v. Reports she slept well. Later this AM c/o "feeling really weak and tired." Thorough PE revealed no concerning findings. BP: (107-170)/(53-88) 107/53, HR 68 at bedside. UOP >120mL/hr overnight. Encouraged PO hydration. Zuluaga still in place, to be removed this AM. Will reassess after ambulation and get CBC if symptoms persist.   03/29/2018 - POD #2 s/p abdominal myomectomy. Stayed overnight due to concerns about pain control at home. This morning she is doing well, reports feeling much better, and pain is 3/10 "at most." She is tolerating a regular diet with no nausea. Ambulating, voiding spontaneously, and passing flatus. Vaginal bleeding is spotting only.   "

## 2018-03-28 NOTE — PROGRESS NOTES
"[facility]  Obstetrics & Gynecology  Progress Note    Patient Name: Brianne Blas  MRN: [id number]  Admission Date: 3/27/2018  Primary Care Provider: Filippo Manning MD  Principal Problem: S/P myomectomy    Subjective:     HPI:  Brianne Blas is a 37 y.o. female  presents for an abdominal myomectomy  today.       She has had two miscarriages.  She was found at that time to have fibroids on ultrasound.  She also reports heavy menses.  She uses 4-5 overnight pads on her heaviest days.       She has anemia based on her preop labs and is taking a PNV with iron.    Interval History:   2018 - POD #1 s/p abdominal myomectomy. Patient initially reporting doing well overnight. Pain was controlled (required IV meperidine x 1, otherwise PO medications only), she tolerated regular diet without n/v. Reports she slept well. Later this AM c/o "feeling really weak and tired." Thorough PE revealed no concerning findings. BP: (107-170)/(53-88) 107/53, HR 68 at bedside. UOP >120mL/hr overnight. Encouraged PO hydration. Zuluaga still in place, to be removed this AM. Will reassess after ambulation and get CBC if symptoms persist.     Scheduled Meds:   ketorolac  30 mg Intravenous Q6H    Followed by    ibuprofen  800 mg Oral Q8H    labetalol  100 mg Oral Q12H     Continuous Infusions:   lactated ringers 125 mL/hr at 18 2157     PRN Meds:diphenhydrAMINE, meperidine, metoclopramide HCl, ondansetron, oxyCODONE-acetaminophen, oxyCODONE-acetaminophen, simethicone    Review of patient's allergies indicates:  No Known Allergies    Objective:     Vital Signs (Most Recent):  Temp: 98.8 °F (37.1 °C) (18)  Pulse: 68 (18)  Resp: 16 (18)  BP: (!) 107/53 (18)  SpO2: 96 % (18) Vital Signs (24h Range):  Temp:  [97 °F (36.1 °C)-100.3 °F (37.9 °C)] 98.8 °F (37.1 °C)  Pulse:  [52-75] 68  Resp:  [14-20] 16  SpO2:  [94 %-100 %] 96 %  BP: " (107-170)/(53-88) 107/53     Weight: 109 kg (240 lb 4.8 oz)  Body mass index is 37.64 kg/m².  Patient's last menstrual period was 03/23/2018 (exact date).    I&O (Last 24H):    Intake/Output Summary (Last 24 hours) at 03/28/18 0654  Last data filed at 03/28/18 0600   Gross per 24 hour   Intake             2100 ml   Output             2600 ml   Net             -500 ml       Physical Exam:   Constitutional: She is oriented to person, place, and time. She appears well-developed and well-nourished. No distress.    HENT:   Head: Normocephalic and atraumatic.   Mouth/Throat: Oropharynx is clear and moist.    Eyes: Conjunctivae and EOM are normal.    Neck: Neck supple.    Cardiovascular: Normal rate, regular rhythm and normal heart sounds.  Exam reveals no edema.     Pulmonary/Chest: Effort normal and breath sounds normal. No respiratory distress.        Abdominal: Soft. Bowel sounds are normal. She exhibits abdominal incision (clean and dry low transverse incision with dressing in place.). She exhibits no distension. There is tenderness (mild, diffuse). There is no rebound and no guarding.     Genitourinary: Vagina normal.   Genitourinary Comments: No blood on maxipad.           Musculoskeletal: Normal range of motion.       Neurological: She is alert and oriented to person, place, and time.    Skin: Skin is warm and dry. She is not diaphoretic.    Psychiatric: She has a normal mood and affect. Her behavior is normal. Judgment and thought content normal.       Laboratory:  CBC:   Lab Results   Component Value Date    WBC 5.72 03/08/2018    HGB 10.1 (L) 03/08/2018    HCT 32.6 (L) 03/08/2018    MCV 86 03/08/2018     03/08/2018         Assessment/Plan:     * S/P open myomectomy    POD #1  - Continue routine management and advances  - Pain controlled with PO medications  - Remove monreal catheter this morning; spontaneous void trial  - UOP > 120mL/hour overnight  - BP: (107-170)/(53-88) 107/53  - HR 68  - Encourage PO  hydration   - Encourage ambulation   - Monitor VS and symptoms of anemia, and will order post-op CBC if feeling of weakness persists  Plan is for discharge after symptoms resolve and patient voids spontaneously            Slim Mckinney MD  Obstetrics & Gynecology  [facility]

## 2018-03-28 NOTE — MEDICAL/APP STUDENT
S: 36yo F  POD#1 s/p Abd Myomectomy- pt tolerating regular diet well denies nausea/vomit, pain currently at 4/10 which pt feels is well controlled; not yet passing flatus, Zuluaga remains in place, pt not yet ambulating much but did stand at bedside briefly last night    O: Temp:  [98.1 °F (36.7 °C)-100.3 °F (37.9 °C)]   Pulse:  [60-75]   Resp:  [20]   BP: (133-169)/(63-88)   SpO2:  [97 %-99 %]    Urine output- 775mL (325mL currently present + 450mL emptied by Nurse earlier);64.5mL/hr over 12 hrs; clear      Phys Exam  Gen: appears well and in no apparent distress  CVS: regular rate and rhythm  Resp: clear to auscultation   Abd: soft, appropriately tender, active bs   Dressing: clean, dry, island intact  Gu: No vaginal bleeding but pt noted that nurse did change pad overnight   Ext: No edema, pain or tenderness; TEDs on, SCDs temporarily off     A/P: 36yo F  POD#1 s/p Abd Myomectomy-  -Continue regular diet   -Remove Zuluaga catheter this AM   -Encourage ambulation   -Continue DVT prophylaxis with TEDs/SCDs  -continue scheduled pain meds and PRN meds for breakthrough pain  1. Ibuprofen 800mg Q8hr   2. Meperidine 25mg injection (PRN)

## 2018-03-28 NOTE — PLAN OF CARE
Pain control not achieved today throughout shift. Patient will stay overnight for pain control. Purposeful hourly rounding performed, needs met. AAOx3 this shift, vital signs stable, afebrile, breath sounds clear to auscultation, + bowel sounds. PIV maintained, saline locked. Adequate intake and output for 12 hours, voids spontaneously. Tolerating PO intake, no complaints of constipation. Ambulates with assist, no difficulty. Dressing to abdomen removed by MD. Skin clean, dry, intact, edges approximated. Call light within reach, bed alarm on, bed in lowest position with brakes on, side rails up x2,  at bedside, TEDs/SCDs on, IS at bedside, and nonskid socks on. Pt lying in bed in no distress.

## 2018-03-28 NOTE — DISCHARGE SUMMARY
"[facility]  Obstetrics & Gynecology  Discharge Summary    Patient Name: Brianne Blas  MRN: [id number]  Admission Date: 3/27/2018  Hospital Length of Stay: 1 days  Discharge Date and Time:  2018 10:23 AM  Attending Physician: Sarah Hammonds MD   Discharging Provider: Slim Mckinney MD  Primary Care Provider: Filippo Manning MD    HPI:  Brianne Blas is a 37 y.o. female  presents for an abdominal myomectomy  today.       She has had two miscarriages.  She was found at that time to have fibroids on ultrasound.  She also reports heavy menses.  She uses 4-5 overnight pads on her heaviest days.       She has anemia based on her preop labs and is taking a PNV with iron.    Hospital Course:  2018 - POD #1 s/p abdominal myomectomy. Patient initially reporting doing well overnight. Pain was controlled (required IV meperidine x 1, otherwise PO medications only), she tolerated regular diet without n/v. Reports she slept well. Later this AM c/o "feeling really weak and tired." Thorough PE revealed no concerning findings. BP: (107-170)/(53-88) 107/53, HR 68 at bedside. UOP >120mL/hr overnight. Encouraged PO hydration. Zuluaga still in place, to be removed this AM. Will reassess after ambulation and get CBC if symptoms persist.   2018 - POD #2 s/p abdominal myomectomy. Stayed overnight due to concerns about pain control at home. This morning she is doing well, reports feeling much better, and pain is 3/10 "at most." She is tolerating a regular diet with no nausea. Ambulating, voiding spontaneously, and passing flatus. Vaginal bleeding is spotting only.     Procedure(s) (LRB):  MYOMECTOMY - OPEN (N/A)     Significant Diagnostic Studies: Labs: CBC No results for input(s): WBC, HGB, HCT, PLT in the last 48 hours.    Pending Diagnostic Studies:     None        Final Active Diagnoses:    Diagnosis Date Noted POA    PRINCIPAL PROBLEM:  S/P open myomectomy " [Z98.890] 03/27/2018 Not Applicable    Fibroids, intramural [D25.1] 03/27/2018 Yes    Hypertension [I10] 03/27/2018 Yes      Problems Resolved During this Admission:    Diagnosis Date Noted Date Resolved POA        Discharged Condition: good    Disposition: Home or Self Care    Follow Up:  Follow-up Information     Sarah Hammonds MD. Go in 6 weeks.    Specialties:  Obstetrics, Obstetrics and Gynecology  Why:  post-op check  Contact information:  [address] [place] [id number]  [phone]                 Patient Instructions:     Diet Adult Regular     Activity as tolerated     Notify your health care provider if you experience any of the following:  temperature >100.4     Notify your health care provider if you experience any of the following:  persistent nausea and vomiting or diarrhea     Notify your health care provider if you experience any of the following:  severe uncontrolled pain     Notify your health care provider if you experience any of the following:  redness, tenderness, or signs of infection (pain, swelling, redness, odor or green/yellow discharge around incision site)     Notify your health care provider if you experience any of the following:  difficulty breathing or increased cough     Notify your health care provider if you experience any of the following:  severe persistent headache     Notify your health care provider if you experience any of the following:  worsening rash     Notify your health care provider if you experience any of the following:  persistent dizziness, light-headedness, or visual disturbances     Notify your health care provider if you experience any of the following:  increased confusion or weakness       Medications:  Reconciled Home Medications:      Medication List      START taking these medications    ibuprofen 800 MG tablet  Commonly known as:  ADVIL,MOTRIN  Take 1 tablet (800 mg total) by mouth every 8 (eight) hours.      oxyCODONE-acetaminophen 5-325 mg per tablet  Commonly known as:  PERCOCET  Take 1 tablet by mouth every 4 (four) hours as needed.        CONTINUE taking these medications    ferrous sulfate 325 (65 FE) MG EC tablet     labetalol 200 MG tablet  Commonly known as:  NORMODYNE  Take 0.5 tablets (100 mg total) by mouth every 12 (twelve) hours.     PRENATAL VITAMIN ORAL           Where to Get Your Medications      These medications were sent to [facility] #[id number] - [place] - [address] [facility]  [address] [place] [address]    Phone:  [phone]   · ibuprofen 800 MG tablet     You can get these medications from any pharmacy    Bring a paper prescription for each of these medications  · oxyCODONE-acetaminophen 5-325 mg per tablet         Maryann Bales MD  Obstetrics & Gynecology  [facility]

## 2018-03-28 NOTE — HPI
Brianne Familia Blas is a 37 y.o. female  presents for an abdominal myomectomy today.       She has had two miscarriages.  She was found at that time to have fibroids on ultrasound.  She also reports heavy menses.  She uses 4-5 overnight pads on her heaviest days.       She has anemia based on her preop labs and is taking a PNV with iron.

## 2018-03-28 NOTE — PROGRESS NOTES
MD to bedside for afternoon rounds.  Patient is sitting up in bed, reports she feels well and pain is currently controlled.   She is eating without n/v, ambulating, voiding spontaneously.  She reports pain comes and goes, with a cramping component.    Temp:  [97 °F (36.1 °C)-100.3 °F (37.9 °C)] 98.3 °F (36.8 °C)  Pulse:  [54-75] 72  Resp:  [15-20] 18  SpO2:  [96 %-99 %] 99 %  BP: (107-169)/(53-88) 120/56    Gen: NAD  Abd: Obese. BS+. Soft, not distended. Mild tenderness. No guarding.  Heart: RRR  Lungs: CTA bilaterally    A/P:  Continue routine management and advances.  Patient to stay inpatient overnight, as pain control has been difficult at times today.  Plan is for discharge home tomorrow when pain controlled on PO medication only.    Slim Mckinney M.D.  Obstetrics & Gynecology  PGY-1

## 2018-03-29 VITALS
WEIGHT: 240.31 LBS | DIASTOLIC BLOOD PRESSURE: 51 MMHG | HEIGHT: 67 IN | BODY MASS INDEX: 37.72 KG/M2 | RESPIRATION RATE: 20 BRPM | SYSTOLIC BLOOD PRESSURE: 103 MMHG | HEART RATE: 75 BPM | TEMPERATURE: 97 F | OXYGEN SATURATION: 96 %

## 2018-03-29 PROCEDURE — 99024 POSTOP FOLLOW-UP VISIT: CPT | Mod: ,,, | Performed by: OBSTETRICS & GYNECOLOGY

## 2018-03-29 PROCEDURE — 25000003 PHARM REV CODE 250: Performed by: STUDENT IN AN ORGANIZED HEALTH CARE EDUCATION/TRAINING PROGRAM

## 2018-03-29 RX ADMIN — OXYCODONE HYDROCHLORIDE AND ACETAMINOPHEN 1 TABLET: 5; 325 TABLET ORAL at 10:03

## 2018-03-29 RX ADMIN — OXYCODONE HYDROCHLORIDE AND ACETAMINOPHEN 1 TABLET: 10; 325 TABLET ORAL at 03:03

## 2018-03-29 RX ADMIN — SIMETHICONE CHEW TAB 80 MG 80 MG: 80 TABLET ORAL at 10:03

## 2018-03-29 RX ADMIN — LABETALOL HYDROCHLORIDE 100 MG: 100 TABLET, FILM COATED ORAL at 09:03

## 2018-03-29 RX ADMIN — IBUPROFEN 800 MG: 400 TABLET, FILM COATED ORAL at 06:03

## 2018-03-29 NOTE — SUBJECTIVE & OBJECTIVE
"Interval History:   03/29/2018 - POD #2 s/p abdominal myomectomy. Stayed overnight due to concerns about pain control at home. This morning she is doing well, reports feeling much better, and pain is 3/10 "at most." She is tolerating a regular diet with no nausea. Ambulating, voiding spontaneously, and passing flatus. Vaginal bleeding is spotting only.     Scheduled Meds:   ibuprofen  800 mg Oral Q8H    labetalol  100 mg Oral Q12H     Continuous Infusions:   lactated ringers Stopped (03/28/18 0630)     PRN Meds:diphenhydrAMINE, meperidine, metoclopramide HCl, ondansetron, oxyCODONE-acetaminophen, oxyCODONE-acetaminophen, simethicone    Review of patient's allergies indicates:  No Known Allergies    Objective:     Vital Signs (Most Recent):  Temp: 98.3 °F (36.8 °C) (03/29/18 0417)  Pulse: 70 (03/29/18 0417)  Resp: 20 (03/29/18 0417)  BP: (!) 110/55 (03/29/18 0417)  SpO2: 98 % (03/29/18 0417) Vital Signs (24h Range):  Temp:  [98 °F (36.7 °C)-98.9 °F (37.2 °C)] 98.3 °F (36.8 °C)  Pulse:  [66-83] 70  Resp:  [15-20] 20  SpO2:  [96 %-100 %] 98 %  BP: (110-124)/(52-59) 110/55     Weight: 109 kg (240 lb 4.8 oz)  Body mass index is 37.64 kg/m².  Patient's last menstrual period was 03/23/2018 (exact date).    I&O (Last 24H):    Intake/Output Summary (Last 24 hours) at 03/29/18 0720  Last data filed at 03/28/18 1200   Gross per 24 hour   Intake              360 ml   Output              330 ml   Net               30 ml       Physical Exam:   Constitutional: She is oriented to person, place, and time. She appears well-developed and well-nourished.    HENT:   Head: Normocephalic and atraumatic.    Eyes: Conjunctivae and EOM are normal.     Cardiovascular: Normal rate and regular rhythm.     Pulmonary/Chest: Effort normal and breath sounds normal. No respiratory distress.        Abdominal: Soft. Bowel sounds are normal. She exhibits abdominal incision (clean and dry). She exhibits no distension. There is tenderness (mild). " There is no rebound and no guarding.             Musculoskeletal: Normal range of motion.       Neurological: She is alert and oriented to person, place, and time.    Skin: Skin is warm and dry.    Psychiatric: She has a normal mood and affect. Her behavior is normal.     Laboratory:  I have personallly reviewed all pertinent lab results from the last 24 hours.

## 2018-03-29 NOTE — DISCHARGE INSTRUCTIONS
Myomectomy  Myomectomy is a surgical procedure to remove uterine fibroids. This procedure may preserve your uterus and your ability to have children.  Before your surgery  Depending on which procedure you and your healthcare provider choose, you may be asked to do the following:  · Have tests that your health care provider has ordered.  · Stop eating and drinking at midnight before your surgery, or as recommended by your healthcare provider.  · Take medicines as prescribed by your healthcare provider to shrink fibroids.  · Stop taking aspirin or ibuprofen 1 week before surgery. Tell your healthcare provider what other medicines you take and ask whether you should stop them.  · Arrange for a ride home after surgery.    Types of myomectomy procedures  Abdominal  Your healthcare provider makes incisions in your stomach and uterus to remove the fibroids. Then the uterus is repaired and the incisions are closed.  Laparoscopic  Your healthcare provider inserts a laparoscope and other surgical instruments through half-inch incisions in your stomach. One or more incisions are made in your uterus to remove the fibroids. Then the uterus is repaired and the incisions are closed.  Hysteroscopic  A hysteroscope is inserted into the vagina. An instrument is used to remove the fibroids from your uterus.  Call your healthcare provider  Contact your healthcare provider right away if you have any of the following:  · Severe or increasing pain  · Fever or chills  · Nausea or vomiting  · Redness or swelling around your incision  · Persistent or heavy vaginal bleeding   Date Last Reviewed: 2/1/2017 © 2000-2017 [employer]. [address] [place] [id number]. All rights reserved. This information is not intended as a substitute for professional medical care. Always follow your healthcare professional's instructions.

## 2018-03-29 NOTE — PLAN OF CARE
Problem: Patient Care Overview  Goal: Plan of Care Review  Outcome: Ongoing (interventions implemented as appropriate)  Patient had an uneventful night, no falls, no skin breakdown, no trauma. Pain controlled with PO pain meds. Voiding adequately. VSS.  at bedside. Ambulated in [place] with family. Purposeful hourly rounding done throughout the shift. Will continue to monitor.

## 2018-03-29 NOTE — ASSESSMENT & PLAN NOTE
POD #1  - Continue routine management and advances  - Pain controlled with PO medications  - Voiding spontaneously  - BP: (110-124)/(52-59) 110/55  - Tolerating regular diet. Encourage PO hydration   - Encourage ambulation   - Teds and SCDs for DVT prophylaxis while in bed  - no further symptoms of anemia  Plan is for discharge home today with PO pain medication

## 2018-03-29 NOTE — PROGRESS NOTES
"[facility]  Obstetrics & Gynecology  Progress Note    Patient Name: Brianne Blas  MRN: [id number]  Admission Date: 3/27/2018  Primary Care Provider: Filippo Manning MD  Principal Problem: S/P myomectomy    Subjective:     HPI:  Brianne Blas is a 37 y.o. female  presents for an abdominal myomectomy  today.       She has had two miscarriages.  She was found at that time to have fibroids on ultrasound.  She also reports heavy menses.  She uses 4-5 overnight pads on her heaviest days.       She has anemia based on her preop labs and is taking a PNV with iron.    Interval History:   2018 - POD #2 s/p abdominal myomectomy. Stayed overnight due to concerns about pain control at home. This morning she is doing well, reports feeling much better, and pain is 3/10 "at most." She is tolerating a regular diet with no nausea. Ambulating, voiding spontaneously, and passing flatus. Vaginal bleeding is spotting only.     Scheduled Meds:   ibuprofen  800 mg Oral Q8H    labetalol  100 mg Oral Q12H     Continuous Infusions:   lactated ringers Stopped (18 0630)     PRN Meds:diphenhydrAMINE, meperidine, metoclopramide HCl, ondansetron, oxyCODONE-acetaminophen, oxyCODONE-acetaminophen, simethicone    Review of patient's allergies indicates:  No Known Allergies    Objective:     Vital Signs (Most Recent):  Temp: 98.3 °F (36.8 °C) (18)  Pulse: 70 (18)  Resp: 20 (18)  BP: (!) 110/55 (18)  SpO2: 98 % (18) Vital Signs (24h Range):  Temp:  [98 °F (36.7 °C)-98.9 °F (37.2 °C)] 98.3 °F (36.8 °C)  Pulse:  [66-83] 70  Resp:  [15-20] 20  SpO2:  [96 %-100 %] 98 %  BP: (110-124)/(52-59) 110/55     Weight: 109 kg (240 lb 4.8 oz)  Body mass index is 37.64 kg/m².  Patient's last menstrual period was 2018 (exact date).    I&O (Last 24H):    Intake/Output Summary (Last 24 hours) at 18 0720  Last data filed at 18 " 1200   Gross per 24 hour   Intake              360 ml   Output              330 ml   Net               30 ml       Physical Exam:   Constitutional: She is oriented to person, place, and time. She appears well-developed and well-nourished.    HENT:   Head: Normocephalic and atraumatic.    Eyes: Conjunctivae and EOM are normal.     Cardiovascular: Normal rate and regular rhythm.     Pulmonary/Chest: Effort normal and breath sounds normal. No respiratory distress.        Abdominal: Soft. Bowel sounds are normal. She exhibits abdominal incision (clean and dry). She exhibits no distension. There is tenderness (mild). There is no rebound and no guarding.             Musculoskeletal: Normal range of motion.       Neurological: She is alert and oriented to person, place, and time.    Skin: Skin is warm and dry.    Psychiatric: She has a normal mood and affect. Her behavior is normal.     Laboratory:  I have personallly reviewed all pertinent lab results from the last 24 hours.        Assessment/Plan:     * S/P open myomectomy    POD #1  - Continue routine management and advances  - Pain controlled with PO medications  - Voiding spontaneously  - BP: (110-124)/(52-59) 110/55  - Tolerating regular diet. Encourage PO hydration   - Encourage ambulation   - Teds and SCDs for DVT prophylaxis while in bed  - no further symptoms of anemia  Plan is for discharge home today with PO pain medication            Slim Mckinney MD  Obstetrics & Gynecology  [facility]

## 2018-03-29 NOTE — MEDICAL/APP STUDENT
S: 38yo F  POD#2 s/p Abd Myomectomy- pt tolerating regular diet well denies nausea/vomit, pain currently at 3/10 which pt feels is well controlled; passing flatus as of last night, voiding spontaneously, pt ambulated in halls last night, tolerated well     O:   Temp:  [98 °F (36.7 °C)-98.9 °F (37.2 °C)]   Pulse:  [68-83]   Resp:  [18-20]   BP: (110-122)/(52-59)   SpO2:  [96 %-100 %]      Phys Exam  Gen: appears well and in no apparent distress  CVS: regular rate and rhythm  Resp: clear to auscultation   Abd: soft, appropriately tender, R>L, hypoactive bs   Dressing: clean, dry  Gu: No vaginal bleeding but pt noted that nurse did change pad overnight   Ext: No edema, pain or tenderness; TEDs on, SCDs temporarily off        A/P: 38yo F  POD#2 s/p Abd Myomectomy-  1.s/p Abd Myomectomy  -Continue regular diet   -Encourage ambulation   -Continue DVT prophylaxis with TEDs/SCDs  -continue scheduled pain meds and PRN meds for breakthrough pain  1. Ibuprofen 800mg Q8hr (scheduled)  2. Oxycodone-acetaminophen 10-325mg Q4hr (PRN)  -D/c to home     2. HTN  -continue home regimen of labetalol 100mg Q12hr

## 2018-03-30 NOTE — PLAN OF CARE
Problem: Patient Care Overview  Goal: Plan of Care Review  Outcome: Outcome(s) achieved Date Met: 03/29/18  Patient remains free from injury or falls.  Vital signs stable on room air.  Positions self independently.  Voiding adequately through shift.  Frequent checks on pt performed at appropriate intervals, monitor/manage analgesia as needed throughout shift.  Bed in low locked position and call light within reach.     Pt given discharge instructions, pt acknowledges understanding. Pt IV removed.  Pt leaves med-surg unit via wheelchair transport to go home with spouse.

## 2018-04-01 ENCOUNTER — NURSE TRIAGE (OUTPATIENT)
Dept: ADMINISTRATIVE | Facility: CLINIC | Age: 38
End: 2018-04-01

## 2018-04-02 ENCOUNTER — TELEPHONE (OUTPATIENT)
Dept: OBSTETRICS AND GYNECOLOGY | Facility: CLINIC | Age: 38
End: 2018-04-02

## 2018-04-02 NOTE — TELEPHONE ENCOUNTER
Call transferred from Mrs. Morataya ().     Spoke with pt about a telephone call with saida nurse on last night regarding constipation. Advised I am following up with her to see if she tried the rec. Pt states since the phone call last night she only tried the Miralax once but with water she did not have any prune juice. Pt advised of rec again. Try Miralax with WARM prune juice, glycerine supp. Increasing water and fiber intake all day. If no relief after these steps please give the office a call back. Pt voiced understanding

## 2018-04-02 NOTE — TELEPHONE ENCOUNTER
"surgery on 3/27, constipated    Reason for Disposition   Last bowel movement (BM) > 4 days ago    Answer Assessment - Initial Assessment Questions  1. SYMPTOM: "What's the main symptom you're concerned about?" (e.g., pain, fever, vomiting)     Constipated, + flatus. No stool softener., on pain meds. No nausea.   2. ONSET: "When did ________  start?"     No BM since 3/26 Monday pm   3. SURGERY: "What surgery was performed?"     Uterus surgy   4. DATE of SURGERY: "When was surgery performed?"      3/27  5. ANESTHESIA: " What type of anesthesia did you have?" (e.g., general, spinal, epidural, local)     N/a   6. PAIN: "Is there any pain?" If so, ask: "How bad is it?"  (Scale 1-10; or mild, moderate, severe)     Uncomfortable. Eating fruit , veggies, soup   7. FEVER: "Do you have a fever?" If so, ask: "What is your temperature, how was it measured, and when did it start?"     afeb   8. VOMITING: "Is there any vomiting?" If yes, ask: "How many times?"     No V/N   9. BLEEDING: "Is there any bleeding?" If so, ask: "How much?" and "Where?"     No   10. OTHER SYMPTOMS: "Do you have any other symptoms?" (e.g., drainage from wound, painful urination, constipation)       Good uop, drinking fluids    Answer Assessment - Initial Assessment Questions  1. STOOL PATTERN OR FREQUENCY: "How often do you pass bowel movements (BMs)?"  (Normal range: tid to q 3 days)  "When was the last BM passed?"       BM 3/26  2. STRAINING: "Do you have to strain to have a BM?"       No   3. RECTAL PAIN: "Does your rectum hurt when the stool comes out?" If so, ask: "Do you have hemorrhoids? How bad is the pain?"  (Scale 1-10; or mild, moderate, severe)     No   4. STOOL COMPOSITION: "Are the stools hard?"      No   5. BLOOD ON STOOLS: "Has there been any blood on the toilet tissue or on the surface of the BM?" If so, ask: "When was the last time?"      No   6. CHRONIC CONSTIPATION: "Is this a new problem for you?"  If no, ask: How long have you had " "this problem?" (days, weeks, months)       No   7. CHANGES IN DIET: "Have there been any recent changes in your diet?"      No   8. MEDICATIONS: "Have you been taking any new medications?"     Pain meds   9. LAXATIVES: "Have you been using any laxatives or enemas?"  If yes, ask "What, how often, and when was the last time?"     No   10. CAUSE: "What do you think is causing the constipation?"        Postop   11. OTHER SYMPTOMS: "Do you have any other symptoms?" (e.g., abdominal pain, fever, vomiting)       No   12. PREGNANCY: "Is there any chance you are pregnant?" "When was your last menstrual period?"       No    Protocols used: ST CONSTIPATION-A-AH, ST POST-OP SYMPTOMS AND MMQCENUGO-C-YH  ER warnings given. rec miralax in warm prune juice, glycerine supp. call back with questions.     "

## 2018-04-08 DIAGNOSIS — Z98.890 S/P MYOMECTOMY: ICD-10-CM

## 2018-04-10 RX ORDER — IBUPROFEN 800 MG/1
800 TABLET ORAL EVERY 8 HOURS
Qty: 40 TABLET | Refills: 0 | Status: SHIPPED | OUTPATIENT
Start: 2018-04-10 | End: 2018-06-12

## 2018-05-09 ENCOUNTER — PATIENT MESSAGE (OUTPATIENT)
Dept: OBSTETRICS AND GYNECOLOGY | Facility: CLINIC | Age: 38
End: 2018-05-09

## 2018-05-09 ENCOUNTER — OFFICE VISIT (OUTPATIENT)
Dept: OBSTETRICS AND GYNECOLOGY | Facility: CLINIC | Age: 38
End: 2018-05-09
Attending: OBSTETRICS & GYNECOLOGY
Payer: COMMERCIAL

## 2018-05-09 VITALS
SYSTOLIC BLOOD PRESSURE: 134 MMHG | BODY MASS INDEX: 38.13 KG/M2 | HEIGHT: 67 IN | WEIGHT: 242.94 LBS | DIASTOLIC BLOOD PRESSURE: 96 MMHG

## 2018-05-09 DIAGNOSIS — Z09 POSTOP CHECK: Primary | ICD-10-CM

## 2018-05-09 PROCEDURE — 99024 POSTOP FOLLOW-UP VISIT: CPT | Mod: S$GLB,,, | Performed by: OBSTETRICS & GYNECOLOGY

## 2018-05-09 PROCEDURE — 99999 PR PBB SHADOW E&M-EST. PATIENT-LVL III: CPT | Mod: PBBFAC,,, | Performed by: OBSTETRICS & GYNECOLOGY

## 2018-05-09 NOTE — LETTER
May 9, 2018    Brianne Blas  [address]  [place] [address]         [facility] [address]  [address] [place] [address] [address]  [facility] [address]  Phone: [phone]  Fax: [phone] May 9, 2018     Patient: Brianne Blas   YOB: 1980   Date of Visit: 5/9/2018       To Whom It May Concern:    It is my medical opinion that Brianne Blas may return to light duty immediately with the following restrictions: no heavy lifting, pushing, pulling, or standing for long periods of time.  She may return to full duty on June 4.    If you have any questions or concerns, please don't hesitate to call.    Sincerely,        Sarah Hammonds MD

## 2018-05-09 NOTE — PROGRESS NOTES
"CC: Postoperative visit    Brianne Familia Blas is a 37 y.o. female  presents for a postoperative visit s/p abdominal myomectomy on 3/27/2018.  Her postoperative course was uncomplicated.  She is doing well postoperative.    She reports mother is BRCA negative.      Pathology showed:  FINAL PATHOLOGIC DIAGNOSIS  SMOOTH MUSCLE: LEIOMYOMATA.          BP (!) 134/96 (BP Location: Left arm, Patient Position: Sitting, BP Method: Large (Manual))   Ht 5' 7" (1.702 m)   Wt 110.2 kg (242 lb 15.2 oz)   LMP 2018   BMI 38.05 kg/m²     ROS:  GENERAL: No fever, chills, fatigability or weight loss.  VULVAR: No pain, no lesions and no itching.  VAGINAL: No relaxation, no itching, no discharge, no abnormal bleeding and no lesions.  ABDOMEN: No abdominal pain. Denies nausea. Denies vomiting. No diarrhea. No constipation  BREAST: Denies pain. No lumps. No discharge.  URINARY: No incontinence, no nocturia, no frequency and no dysuria.  CARDIOVASCULAR: No chest pain. No shortness of breath. No leg cramps.  NEUROLOGICAL: No headaches. No vision changes.    Physical Exam    INCISION: clean, dry, intact.  Well healed.    PELVIC: Normal external genitalia without lesions.  Normal hair distribution.  Adequate perineal body, normal urethral meatus.  Vagina moist and well rugated without lesions or discharge.  Cervix pink, without lesions, discharge or tenderness.  No significant cystocele or rectocele.  Bimanual exam shows uterus to be normal size, regular, mobile and nontender.  Adnexa without masses or tenderness.      1. Postop check         Patient can return to light duty for 2 weeks, then full duty.  Recommended f/u with PCP for elevated blood pressure.  Delay conception for 4-6 months  Return to clinic in 1 year for well woman exam.    "

## 2018-05-29 PROBLEM — G47.9 SLEEP DISORDER: Status: ACTIVE | Noted: 2018-05-29

## 2018-05-29 PROBLEM — G43.919 REFRACTORY MIGRAINE: Status: ACTIVE | Noted: 2018-05-29

## 2018-05-29 PROBLEM — M54.81 CERVICO-OCCIPITAL NEURALGIA: Status: ACTIVE | Noted: 2018-05-29

## 2018-05-29 PROBLEM — E66.9 OBESITY: Status: ACTIVE | Noted: 2018-05-29

## 2018-05-29 PROBLEM — E55.9 VITAMIN D DEFICIENCY: Status: ACTIVE | Noted: 2018-05-29

## 2018-05-29 PROBLEM — R42 DIZZINESS AND GIDDINESS: Status: ACTIVE | Noted: 2018-05-29

## 2018-05-29 PROBLEM — M54.2 NECK PAIN: Status: ACTIVE | Noted: 2018-05-29

## 2018-06-12 ENCOUNTER — OFFICE VISIT (OUTPATIENT)
Dept: INTERNAL MEDICINE | Facility: CLINIC | Age: 38
End: 2018-06-12
Attending: FAMILY MEDICINE
Payer: COMMERCIAL

## 2018-06-12 ENCOUNTER — PATIENT MESSAGE (OUTPATIENT)
Dept: ADMINISTRATIVE | Facility: OTHER | Age: 38
End: 2018-06-12

## 2018-06-12 ENCOUNTER — LAB VISIT (OUTPATIENT)
Dept: LAB | Facility: OTHER | Age: 38
End: 2018-06-12
Attending: FAMILY MEDICINE
Payer: COMMERCIAL

## 2018-06-12 VITALS
DIASTOLIC BLOOD PRESSURE: 90 MMHG | HEART RATE: 62 BPM | HEIGHT: 67 IN | WEIGHT: 241.63 LBS | OXYGEN SATURATION: 99 % | SYSTOLIC BLOOD PRESSURE: 148 MMHG | BODY MASS INDEX: 37.92 KG/M2

## 2018-06-12 DIAGNOSIS — I10 HYPERTENSION, UNSPECIFIED TYPE: ICD-10-CM

## 2018-06-12 DIAGNOSIS — E66.01 SEVERE OBESITY (BMI 35.0-39.9) WITH COMORBIDITY: ICD-10-CM

## 2018-06-12 DIAGNOSIS — Z00.00 ANNUAL PHYSICAL EXAM: Primary | ICD-10-CM

## 2018-06-12 DIAGNOSIS — Z00.00 ANNUAL PHYSICAL EXAM: ICD-10-CM

## 2018-06-12 DIAGNOSIS — I10 ESSENTIAL HYPERTENSION: ICD-10-CM

## 2018-06-12 LAB
25(OH)D3+25(OH)D2 SERPL-MCNC: 10 NG/ML
CHOLEST SERPL-MCNC: 166 MG/DL
CHOLEST/HDLC SERPL: 3.8 {RATIO}
ESTIMATED AVG GLUCOSE: 85 MG/DL
HBA1C MFR BLD HPLC: 4.6 %
HDLC SERPL-MCNC: 44 MG/DL
HDLC SERPL: 26.5 %
LDLC SERPL CALC-MCNC: 114.6 MG/DL
NONHDLC SERPL-MCNC: 122 MG/DL
TRIGL SERPL-MCNC: 37 MG/DL
TSH SERPL DL<=0.005 MIU/L-ACNC: 1.23 UIU/ML
VIT B12 SERPL-MCNC: 295 PG/ML

## 2018-06-12 PROCEDURE — 3080F DIAST BP >= 90 MM HG: CPT | Mod: CPTII,S$GLB,, | Performed by: FAMILY MEDICINE

## 2018-06-12 PROCEDURE — 82607 VITAMIN B-12: CPT

## 2018-06-12 PROCEDURE — 82306 VITAMIN D 25 HYDROXY: CPT

## 2018-06-12 PROCEDURE — 99999 PR PBB SHADOW E&M-EST. PATIENT-LVL III: CPT | Mod: PBBFAC,,, | Performed by: FAMILY MEDICINE

## 2018-06-12 PROCEDURE — 36415 COLL VENOUS BLD VENIPUNCTURE: CPT

## 2018-06-12 PROCEDURE — 3077F SYST BP >= 140 MM HG: CPT | Mod: CPTII,S$GLB,, | Performed by: FAMILY MEDICINE

## 2018-06-12 PROCEDURE — 80061 LIPID PANEL: CPT

## 2018-06-12 PROCEDURE — 83036 HEMOGLOBIN GLYCOSYLATED A1C: CPT

## 2018-06-12 PROCEDURE — 99385 PREV VISIT NEW AGE 18-39: CPT | Mod: S$GLB,,, | Performed by: FAMILY MEDICINE

## 2018-06-12 PROCEDURE — 84443 ASSAY THYROID STIM HORMONE: CPT

## 2018-06-12 RX ORDER — ERGOCALCIFEROL 1.25 MG/1
50000 CAPSULE ORAL
Qty: 12 CAPSULE | Refills: 0 | Status: SHIPPED | OUTPATIENT
Start: 2018-06-12 | End: 2018-07-12

## 2018-06-12 RX ORDER — LABETALOL 200 MG/1
100 TABLET, FILM COATED ORAL EVERY 12 HOURS
Qty: 180 TABLET | Refills: 3 | Status: SHIPPED | OUTPATIENT
Start: 2018-06-12 | End: 2018-07-13

## 2018-06-12 NOTE — PROGRESS NOTES
"Subjective:      Patient ID: Brianne Blas is a 38 y.o. female.    Chief Complaint: Establish Care and Hypertension    She is here for annual exam and has htn, migraines. She takes ibuprofen as needed for headaches. She has changed her eating habits and just completed kathi 4 times a week.       Review of Systems   Constitutional: Negative for activity change, appetite change, chills, diaphoresis, fatigue, fever and unexpected weight change.   HENT: Negative for congestion, ear discharge, ear pain, hearing loss, postnasal drip, rhinorrhea, sinus pressure and sore throat.    Eyes: Negative for visual disturbance.   Respiratory: Negative for cough, shortness of breath and wheezing.    Cardiovascular: Negative for chest pain.   Gastrointestinal: Negative for abdominal pain, constipation, diarrhea, nausea and vomiting.   Genitourinary: Negative for dysuria, frequency, hematuria and vaginal discharge.   Musculoskeletal: Negative.    Skin: Negative.    Neurological: Negative for dizziness, facial asymmetry, light-headedness and headaches.   Psychiatric/Behavioral: Negative for suicidal ideas.     I personally reviewed Past Medical History, Past Surgical history,  Past Social History and Family History    Objective:   BP (!) 148/90   Pulse 62   Ht 5' 7" (1.702 m)   Wt 109.6 kg (241 lb 10 oz)   SpO2 99%   BMI 37.84 kg/m²     Physical Exam   Constitutional: She is oriented to person, place, and time. She appears well-developed and well-nourished. No distress.   HENT:   Head: Normocephalic and atraumatic.   Right Ear: External ear normal.   Left Ear: External ear normal.   Mouth/Throat: Oropharynx is clear and moist.   Eyes: Conjunctivae and EOM are normal. Pupils are equal, round, and reactive to light. Right eye exhibits no discharge. Left eye exhibits no discharge. No scleral icterus.   Neck: Normal range of motion. Neck supple.   Cardiovascular: Normal rate, regular rhythm, normal heart sounds and intact " distal pulses.  Exam reveals no gallop.    No murmur heard.  Pulmonary/Chest: Effort normal and breath sounds normal. No respiratory distress. She has no wheezes. She has no rales. She exhibits no tenderness.   Abdominal: Soft. Bowel sounds are normal. She exhibits no distension and no mass. There is no tenderness. There is no rebound and no guarding.   Neurological: She is alert and oriented to person, place, and time.   Skin: Skin is warm and dry.   Vitals reviewed.      Brianne was seen today for establish care and hypertension.    Diagnoses and all orders for this visit:    Annual physical exam  -     Hypertension Digital Medicine ([facility]) Enrollment Order  -     Hypertension Digital Medicine ([facility]): Assign Onboarding Questionnaires  -     Lipid panel; Future  -     Vitamin D; Future  -     Vitamin B12; Future  -     TSH; Future  -     Hemoglobin A1c; Future  -     2D Echo w/ Color Flow Doppler; Future    Hypertension, unspecified type  -uncontrolled, increase labetalol to twice a day and start digital htn program   -nurse visit bp check in 2 weeks  -     Hypertension Digital Medicine ([facility]) Enrollment Order  -     Hypertension Digital Medicine ([facility]): Assign Onboarding Questionnaires      Severe obesity (BMI 35.0-39.9) with comorbidity  -  Cont current dietary changes

## 2018-06-12 NOTE — PATIENT INSTRUCTIONS
Take fiber supplements such as Metamucil, Citrucel, Konsyl, etc. (Benefiber is less effective so avoid)  The goal is 1-2 nonstraining nonloose bowel movements per day.  In general we recommend about 25-30 grams of fiber daily or reaching the above bowel movement goal.    120 fl oz water daily

## 2018-06-13 ENCOUNTER — PATIENT MESSAGE (OUTPATIENT)
Dept: INTERNAL MEDICINE | Facility: CLINIC | Age: 38
End: 2018-06-13

## 2018-06-14 ENCOUNTER — PATIENT OUTREACH (OUTPATIENT)
Dept: OTHER | Facility: OTHER | Age: 38
End: 2018-06-14

## 2018-06-14 NOTE — LETTER
Dee Watson PharmD  [address] [place] [address]     Dear Brianne Blas,    Welcome to the [facility]pert[facility] Digital Medicine Program!           My name is Dee Watson PharmD and I am your dedicated Digital Medicine clinician.  As an expert in medication management, I will help ensure that the medications you are taking continue to provide you with the intended benefits.        I am Livia Wilson and I will be your health  for the duration of the program.  My  job is to help you identify lifestyle changes to improve your blood pressure control.  We will talk about nutrition, exercise, and other ways that you may be able to adjust your current habits to better your health. Together, we will work to improve your overall health and encourage you to meet your goals for a healthier lifestyle.    What we expect from YOU:    You will need to take blood pressure readings multiple times a week and no less than one reading per week.   It is important that you take your measurements at different times during the day, when possible.     What you should expect from your Digital Medicine Care Team:   We will provide you with education about high blood pressure, including lifestyle changes that could help you to control your blood pressure.   We will review your weekly readings and provide you with monthly blood pressure progress reports after you have been in the program for more than 30 days.   We will send monthly progress reports on your blood pressure control to your physician so they can follow along with your progress as well.    You will be able to reach me by phone at [phone] or through your [facility] account by clicking my name under Care Team on the right side of the home screen.    I look forward to working with you to achieve your blood pressure goals!  Sincerely,    Dee Watson PharmD  Your personal clinician    Please visit  [email or web address] to learn more about high blood pressure and what you can do lower your blood pressure.                                                                                           [address]  [place] [address]

## 2018-06-14 NOTE — PROGRESS NOTES
Last 5 Patient Entered Readings                                      Current 30 Day Average: 157/100     Recent Readings 6/13/2018 6/13/2018 6/12/2018 6/12/2018    SBP (mmHg) 158 175 156 156    DBP (mmHg) 102 117 97 112    Pulse 61 61 61 63        Digital Medicine: Health  Introduction Call     Left voicemail and requested call back in order to complete digital medicine health introduction call. First attempt.

## 2018-06-19 NOTE — PROGRESS NOTES
Last 5 Patient Entered Readings                                      Current 30 Day Average: 157/100     Recent Readings 6/13/2018 6/13/2018 6/12/2018 6/12/2018    SBP (mmHg) 158 175 156 156    DBP (mmHg) 102 117 97 112    Pulse 61 61 61 63        Digital Medicine: Health  Introduction Call     Left voicemail and requested call back in order to complete digital medicine health introduction call. Second attempt.

## 2018-06-20 ENCOUNTER — HOSPITAL ENCOUNTER (OUTPATIENT)
Dept: CARDIOLOGY | Facility: OTHER | Age: 38
Discharge: HOME | End: 2018-06-20
Attending: FAMILY MEDICINE
Payer: COMMERCIAL

## 2018-06-20 ENCOUNTER — PATIENT MESSAGE (OUTPATIENT)
Dept: INTERNAL MEDICINE | Facility: CLINIC | Age: 38
End: 2018-06-20

## 2018-06-20 DIAGNOSIS — I10 HYPERTENSION, UNSPECIFIED TYPE: ICD-10-CM

## 2018-06-20 DIAGNOSIS — Z00.00 ANNUAL PHYSICAL EXAM: ICD-10-CM

## 2018-06-20 DIAGNOSIS — E66.01 SEVERE OBESITY (BMI 35.0-39.9) WITH COMORBIDITY: ICD-10-CM

## 2018-06-20 LAB
DIASTOLIC DYSFUNCTION: NO
ESTIMATED PA SYSTOLIC PRESSURE: 19.18
GLOBAL PERICARDIAL EFFUSION: NORMAL
MITRAL VALVE REGURGITATION: NORMAL
RETIRED EF AND QEF - SEE NOTES: 65 (ref 55–65)
TRICUSPID VALVE REGURGITATION: NORMAL

## 2018-06-20 PROCEDURE — 93306 TTE W/DOPPLER COMPLETE: CPT

## 2018-06-20 NOTE — PROGRESS NOTES
Last 5 Patient Entered Readings                                      Current 30 Day Average: 157/100     Recent Readings 6/13/2018 6/13/2018 6/12/2018 6/12/2018    SBP (mmHg) 158 175 156 156    DBP (mmHg) 102 117 97 112    Pulse 61 61 61 63          Digital Medicine: Health  Introduction    Introduced Ms. Brianne Blas to Digital Medicine. Discussed health  role and recommended lifestyle modifications.    Lifestyle Assessment:  Current Dietary Habits(i.e. low sodium, food labels, dining out): Patient reports that over the past year, she has changed cooking and eating habits. Eats more vegetables, less red meat, and is trying to cut out dairy and gluten. Patient makes substitutions such as cauliflower rice/brown rice over white rice, bake chicken and fish, ground turkey and chicken over red meats, uses spaghetti squash or zucchini noodles over pasta. Encouraged patient to keep up the great work.     Exercise: Patient had surgery a couple months ago, and just restarted exercising at the beginning of June. Does kathi 3-5 days per week, 45-60 minutes each time.     Alcohol/Tobacco: deferred    Medication Adherence: has been compliant with the medicaiton regimen     Other goals: Wants to focus on nutrition. Has lost 40 lbs this past year, and wants to continue to lose weight. Notices more energy/stamina, and is less groggy and better rested since weight loss. Encouraged patient to continue with these healthy changes, and sent additional nutrition resources.     Patient reports frustration with BP readings. Reports that BP has never been this high before. Reviewed proper BP measuring technique with patient and identified adjustments to make in the future. Patient was sitting on couch with arm by her side while taking readings, waited 15 minutes after taking medications before measuring BP, and did not take time to assure she was resting. Patient confirms that she will adjust all of this in the  future for more accurate readings.     Patient reports that she takes medications twice a day, between 6:30-7:30 am and pm.     Reviewed AHA/AACE recommendations:  Limit sodium intake to <2000mg/day  Recommended CHO intake, 45-65% of daily caloric intake  Perform 150 minutes of physical activity per week    Reviewed the importance of self-monitoring, medication adherence, and that the health  can be used as a resource for lifestyle modifications to help reduce or maintain a healthy lifestyle.  Reviewed that the Digital Medicine team is not available for emergencies and instructed the patient to call 911 or [facility] On Call ([phone] or [phone]) if one arises.

## 2018-06-25 ENCOUNTER — PATIENT MESSAGE (OUTPATIENT)
Dept: OBSTETRICS AND GYNECOLOGY | Facility: CLINIC | Age: 38
End: 2018-06-25

## 2018-06-25 RX ORDER — LABETALOL 100 MG/1
100 TABLET, FILM COATED ORAL 2 TIMES DAILY
Qty: 60 TABLET | Refills: 0 | Status: SHIPPED | OUTPATIENT
Start: 2018-06-25 | End: 2018-07-24 | Stop reason: SDUPTHER

## 2018-06-29 ENCOUNTER — PATIENT OUTREACH (OUTPATIENT)
Dept: OTHER | Facility: OTHER | Age: 38
End: 2018-06-29

## 2018-06-29 NOTE — PROGRESS NOTES
7/25/18: 3rd attempt to complete pharmacist introduction. If no call back by 8/6/18 will send NC letter.   8/6/18: LVM and sent NC letter. WCB in 2 weeks.   8/20/18: LVM if no response in 3 weeks will remove patient from registry.   9/25/18: LVM for complete initial pharmacist call. Will send message to PCP and call back in 3 weeks.  10/16/18: No response will remove patient from program.

## 2018-06-29 NOTE — LETTER
Dee Watson, PharmD  [address] [place] [address]     Dear Brianne,    Thank you for enrolling in the [facility] Hypert[facility] Digital [facility] Program. To participate in our program, we ask that you submit a blood pressure reading at least once weekly through your [facility] Account and maintain regular contact with your Care Team.  We have not received any data or heard from you in some time.     The Digital Medicine Care Team has attempted to reach you on multiple occasions to determine if you would like to continue participating in the program. While we encourage you to continue participating fully, we understand that circumstances may change.      To continue participating in the program, please contact me at [phone]. If we do not hear back, you will be un-enrolled and your physician will be notified of your decision.    If you have submitted blood pressure readings during the past 12 days and believe you are receiving this letter in error, please call the Digital Medicine Patient Support Line at [phone] for troubleshooting.      We look forward to hearing from you soon.    Sincerely,     Dee Watson, PharmD  Your Personal Clinical Pharmacist                                                                                                                        [address]  [facility] [address]

## 2018-07-02 ENCOUNTER — PATIENT MESSAGE (OUTPATIENT)
Dept: INTERNAL MEDICINE | Facility: CLINIC | Age: 38
End: 2018-07-02

## 2018-07-11 ENCOUNTER — PATIENT OUTREACH (OUTPATIENT)
Dept: OTHER | Facility: OTHER | Age: 38
End: 2018-07-11

## 2018-07-11 NOTE — PROGRESS NOTES
Last 5 Patient Entered Readings                                      Current 30 Day Average: 145/90     Recent Readings 7/9/2018 6/30/2018 6/30/2018 6/27/2018 6/25/2018    SBP (mmHg) 128 152 162 136 140    DBP (mmHg) 75 89 95 89 90    Pulse 65 65 60 - -          Digital Medicine: Health  Follow Up    Left voicemail to follow up with Ms. Brianne Blas.  Current BP average 137/86 mmHg is not at goal, 130/80 mmHg.    Reminded patient to take at minimum weekly readings

## 2018-07-13 ENCOUNTER — PATIENT MESSAGE (OUTPATIENT)
Dept: OBSTETRICS AND GYNECOLOGY | Facility: CLINIC | Age: 38
End: 2018-07-13

## 2018-07-13 ENCOUNTER — OFFICE VISIT (OUTPATIENT)
Dept: OBSTETRICS AND GYNECOLOGY | Facility: CLINIC | Age: 38
End: 2018-07-13
Payer: COMMERCIAL

## 2018-07-13 VITALS
DIASTOLIC BLOOD PRESSURE: 84 MMHG | WEIGHT: 240.75 LBS | HEIGHT: 67 IN | SYSTOLIC BLOOD PRESSURE: 124 MMHG | BODY MASS INDEX: 37.79 KG/M2

## 2018-07-13 DIAGNOSIS — L03.90 CELLULITIS, UNSPECIFIED CELLULITIS SITE: Primary | ICD-10-CM

## 2018-07-13 PROCEDURE — 3079F DIAST BP 80-89 MM HG: CPT | Mod: CPTII,S$GLB,, | Performed by: OBSTETRICS & GYNECOLOGY

## 2018-07-13 PROCEDURE — 99213 OFFICE O/P EST LOW 20 MIN: CPT | Mod: S$GLB,,, | Performed by: OBSTETRICS & GYNECOLOGY

## 2018-07-13 PROCEDURE — 99999 PR PBB SHADOW E&M-EST. PATIENT-LVL III: CPT | Mod: PBBFAC,,, | Performed by: OBSTETRICS & GYNECOLOGY

## 2018-07-13 PROCEDURE — 3008F BODY MASS INDEX DOCD: CPT | Mod: CPTII,S$GLB,, | Performed by: OBSTETRICS & GYNECOLOGY

## 2018-07-13 PROCEDURE — 3074F SYST BP LT 130 MM HG: CPT | Mod: CPTII,S$GLB,, | Performed by: OBSTETRICS & GYNECOLOGY

## 2018-07-13 RX ORDER — FLUCONAZOLE 150 MG/1
TABLET ORAL
Refills: 2 | COMMUNITY
Start: 2018-06-25 | End: 2019-10-10

## 2018-07-13 RX ORDER — SULFAMETHOXAZOLE AND TRIMETHOPRIM 800; 160 MG/1; MG/1
1 TABLET ORAL 2 TIMES DAILY
Qty: 14 TABLET | Refills: 0 | Status: SHIPPED | OUTPATIENT
Start: 2018-07-13 | End: 2018-07-20

## 2018-07-13 NOTE — PROGRESS NOTES
Subjective:       Patient ID: Brianne Blas is a 38 y.o. female.    Chief Complaint:  Wound Check      History of Present Illness:  Wound Check     Pelvic Pain   The patient's pertinent negatives include no pelvic pain. This is a new problem. The current episode started in the past 7 days. The problem occurs daily. The problem has been gradually worsening. The pain is mild. The problem affects the right side. She is not pregnant. Associated symptoms include abdominal pain and rash. Pertinent negatives include no anorexia, back pain, chills, constipation, diarrhea, discolored urine, dysuria, fever, flank pain, frequency, headaches, hematuria, nausea, painful intercourse, urgency or vomiting. She has tried NSAIDs and warm bath for the symptoms. The treatment provided no relief. She is sexually active. No, her partner does not have an STD. She uses nothing for contraception. Her menstrual history has been regular. Her past medical history is significant for an abdominal surgery, a gynecological surgery, menorrhagia and miscarriage. There is no history of a  section, an ectopic pregnancy, endometriosis, herpes simplex, metrorrhagia, ovarian cysts, perineal abscess, PID, an STD, a terminated pregnancy or vaginosis.     37 y/o  patient of Dr. Hammonds's presents for evaluation of pain and redness above the right aspect of her abdominal myomectomy incision. Patient's surgery was about 4 months ago on 3/27/18. Postop course has been otherwise uncomplicated. Reports 3-4 days ago noticed some redness in the area but this morning felt a lump under the skin. Reports pain with wearing clothes over the area. Denies fever/chills.     GYN & OB History  Patient's last menstrual period was 2018 (exact date).   Date of Last Pap: 2017    OB History    Para Term  AB Living   2       2     SAB TAB Ectopic Multiple Live Births   2              # Outcome Date GA Lbr Padilla/2nd Weight  Sex Delivery Anes PTL Lv   2 SAB 10/2016           1 SAB 06/2016                  Review of Systems  Review of Systems   Constitutional: Negative for chills and fever.   Gastrointestinal: Positive for abdominal pain. Negative for anorexia, constipation, diarrhea, nausea and vomiting.   Genitourinary: Positive for menorrhagia. Negative for dysuria, flank pain, frequency, hematuria, pelvic pain and urgency.   Musculoskeletal: Negative for back pain.   Skin:  Positive for rash.   Neurological: Negative for headaches.           Objective:    Physical Exam:   Constitutional: She appears well-developed and well-nourished. No distress.    HENT:   Head: Normocephalic and atraumatic.     Neck: Normal range of motion.     Pulmonary/Chest: Effort normal.        Abdominal: Soft. She exhibits abdominal incision (right aspect of pfannenstiel incision with erythema and slight induration under the skin, no fluctuance or drainage). She exhibits no distension. There is tenderness.                 Neurological: She is alert.     Psychiatric: She has a normal mood and affect. Her behavior is normal. Judgment and thought content normal.          Assessment:        1. Cellulitis, unspecified cellulitis site              Plan:      Brianne was seen today for wound check.    Diagnoses and all orders for this visit:    Cellulitis, unspecified cellulitis site  -     sulfamethoxazole-trimethoprim 800-160mg (BACTRIM DS) 800-160 mg Tab; Take 1 tablet by mouth 2 (two) times daily. for 7 days  - No apparent drainable area around incision. Likely cellulitis. Rx bactrim x 1 week. NSAIDs.       No orders of the defined types were placed in this encounter.      Follow-up in about 1 week (around 7/20/2018) for f/u incision.

## 2018-07-13 NOTE — PROGRESS NOTES
Answers for HPI/ROS submitted by the patient on 2018   Pelvic pain  Chronicity: new  Onset: in the past 7 days  Frequency: daily  Progression since onset: gradually worsening  Pain severity: mild  Affected side: right  Pregnant now?: No  abdominal pain: Yes  anorexia: No  back pain: No  chills: No  constipation: No  diarrhea: No  discolored urine: No  dysuria: No  fever: No  flank pain: No  frequency: No  headaches: No  hematuria: No  nausea: No  painful intercourse: No  rash: No  urgency: No  vomiting: No  treatments tried: NSAIDs, warm bath  Improvement on treatment: no relief  Sexual activity: sexually active  Partner with STD symptoms: no  Birth control: nothing  Menstrual history: regular  STD: No  abdominal surgery: Yes   section: No  Ectopic pregnancy: No  Endometriosis: No  herpes simplex: No  gynecological surgery: Yes  menorrhagia: Yes  metrorrhagia: No  miscarriage: Yes  ovarian cysts: No  perineal abscess: No  PID: No  terminated pregnancy: No  vaginosis: No

## 2018-07-13 NOTE — LETTER
July 13, 2018      [facility] [address]  [address]  [facility] [place] [facility] [address]  Phone: [phone]  Fax: [phone]       Patient: Brianne Blas   YOB: 1980  Date of Visit: 07/13/2018    To Whom It May Concern:    Shania Blas  was at [facility] on 07/13/2018.  SHE  may return to work/school on 7/16/18 with no  restrictions. If you have any questions or concerns, or if I can be of further assistance, please do not hesitate to contact me.    Sincerely,    ADONIS López MD

## 2018-07-24 RX ORDER — LABETALOL 100 MG/1
TABLET, FILM COATED ORAL
Qty: 180 TABLET | Refills: 3 | Status: SHIPPED | OUTPATIENT
Start: 2018-07-24 | End: 2019-10-10

## 2018-09-05 ENCOUNTER — PATIENT MESSAGE (OUTPATIENT)
Dept: INTERNAL MEDICINE | Facility: CLINIC | Age: 38
End: 2018-09-05

## 2018-09-18 NOTE — PROGRESS NOTES
Last 5 Patient Entered Readings                                      Current 30 Day Average: 128/85     Recent Readings 9/13/2018 8/15/2018 8/13/2018 8/13/2018 7/25/2018    SBP (mmHg) 128 124 154 149 125    DBP (mmHg) 85 78 91 93 84    Pulse 66 62 69 67 62        Have deferred outreach due to pharmD outreach. Called to follow up while Dee is out. LVM

## 2018-12-19 NOTE — NURSING
Patient states that she feels dizzy and lightedheaded while lying in bed, resident and medi students in to see patient. Made them aware of patient's complaint.   Patient

## 2019-09-09 ENCOUNTER — PATIENT MESSAGE (OUTPATIENT)
Dept: OBSTETRICS AND GYNECOLOGY | Facility: CLINIC | Age: 39
End: 2019-09-09

## 2019-10-10 ENCOUNTER — OFFICE VISIT (OUTPATIENT)
Dept: INTERNAL MEDICINE | Facility: CLINIC | Age: 39
End: 2019-10-10
Attending: FAMILY MEDICINE
Payer: COMMERCIAL

## 2019-10-10 ENCOUNTER — OFFICE VISIT (OUTPATIENT)
Dept: OBSTETRICS AND GYNECOLOGY | Facility: CLINIC | Age: 39
End: 2019-10-10
Attending: OBSTETRICS & GYNECOLOGY
Payer: COMMERCIAL

## 2019-10-10 ENCOUNTER — LAB VISIT (OUTPATIENT)
Dept: LAB | Facility: OTHER | Age: 39
End: 2019-10-10
Attending: FAMILY MEDICINE
Payer: COMMERCIAL

## 2019-10-10 ENCOUNTER — PATIENT MESSAGE (OUTPATIENT)
Dept: INTERNAL MEDICINE | Facility: CLINIC | Age: 39
End: 2019-10-10

## 2019-10-10 ENCOUNTER — PATIENT MESSAGE (OUTPATIENT)
Dept: OBSTETRICS AND GYNECOLOGY | Facility: CLINIC | Age: 39
End: 2019-10-10

## 2019-10-10 VITALS
HEART RATE: 68 BPM | WEIGHT: 223.31 LBS | SYSTOLIC BLOOD PRESSURE: 132 MMHG | BODY MASS INDEX: 35.05 KG/M2 | DIASTOLIC BLOOD PRESSURE: 86 MMHG | OXYGEN SATURATION: 100 % | HEIGHT: 67 IN

## 2019-10-10 VITALS
SYSTOLIC BLOOD PRESSURE: 138 MMHG | WEIGHT: 222 LBS | HEIGHT: 67 IN | BODY MASS INDEX: 34.84 KG/M2 | DIASTOLIC BLOOD PRESSURE: 88 MMHG

## 2019-10-10 DIAGNOSIS — Z00.00 ANNUAL PHYSICAL EXAM: ICD-10-CM

## 2019-10-10 DIAGNOSIS — E66.01 SEVERE OBESITY: ICD-10-CM

## 2019-10-10 DIAGNOSIS — Z31.61 PROCREATIVE COUNSELING AND ADVICE USING NATURAL FAMILY PLANNING: Primary | ICD-10-CM

## 2019-10-10 DIAGNOSIS — I10 ESSENTIAL HYPERTENSION: ICD-10-CM

## 2019-10-10 DIAGNOSIS — Z00.00 ANNUAL PHYSICAL EXAM: Primary | ICD-10-CM

## 2019-10-10 PROBLEM — D25.1 FIBROIDS, INTRAMURAL: Status: RESOLVED | Noted: 2018-03-27 | Resolved: 2019-10-10

## 2019-10-10 LAB
25(OH)D3+25(OH)D2 SERPL-MCNC: 9 NG/ML (ref 30–96)
ALBUMIN SERPL BCP-MCNC: 4.3 G/DL (ref 3.5–5.2)
ALP SERPL-CCNC: 76 U/L (ref 55–135)
ALT SERPL W/O P-5'-P-CCNC: 22 U/L (ref 10–44)
ANION GAP SERPL CALC-SCNC: 9 MMOL/L (ref 8–16)
AST SERPL-CCNC: 27 U/L (ref 10–40)
BASOPHILS # BLD AUTO: 0.03 K/UL (ref 0–0.2)
BASOPHILS NFR BLD: 0.8 % (ref 0–1.9)
BILIRUB SERPL-MCNC: 0.8 MG/DL (ref 0.1–1)
BUN SERPL-MCNC: 8 MG/DL (ref 6–20)
CALCIUM SERPL-MCNC: 9.8 MG/DL (ref 8.7–10.5)
CHLORIDE SERPL-SCNC: 105 MMOL/L (ref 95–110)
CHOLEST SERPL-MCNC: 195 MG/DL (ref 120–199)
CHOLEST/HDLC SERPL: 3.5 {RATIO} (ref 2–5)
CO2 SERPL-SCNC: 27 MMOL/L (ref 23–29)
CREAT SERPL-MCNC: 0.8 MG/DL (ref 0.5–1.4)
DIFFERENTIAL METHOD: ABNORMAL
EOSINOPHIL # BLD AUTO: 0.1 K/UL (ref 0–0.5)
EOSINOPHIL NFR BLD: 1.8 % (ref 0–8)
ERYTHROCYTE [DISTWIDTH] IN BLOOD BY AUTOMATED COUNT: 15.5 % (ref 11.5–14.5)
EST. GFR  (AFRICAN AMERICAN): >60 ML/MIN/1.73 M^2
EST. GFR  (NON AFRICAN AMERICAN): >60 ML/MIN/1.73 M^2
ESTIMATED AVG GLUCOSE: 80 MG/DL (ref 68–131)
GLUCOSE SERPL-MCNC: 70 MG/DL (ref 70–110)
HBA1C MFR BLD HPLC: 4.4 % (ref 4–5.6)
HCT VFR BLD AUTO: 36.8 % (ref 37–48.5)
HDLC SERPL-MCNC: 56 MG/DL (ref 40–75)
HDLC SERPL: 28.7 % (ref 20–50)
HGB BLD-MCNC: 11.1 G/DL (ref 12–16)
IMM GRANULOCYTES # BLD AUTO: 0.01 K/UL (ref 0–0.04)
IMM GRANULOCYTES NFR BLD AUTO: 0.3 % (ref 0–0.5)
LDLC SERPL CALC-MCNC: 130.6 MG/DL (ref 63–159)
LYMPHOCYTES # BLD AUTO: 1.6 K/UL (ref 1–4.8)
LYMPHOCYTES NFR BLD: 38.8 % (ref 18–48)
MCH RBC QN AUTO: 28.1 PG (ref 27–31)
MCHC RBC AUTO-ENTMCNC: 30.2 G/DL (ref 32–36)
MCV RBC AUTO: 93 FL (ref 82–98)
MONOCYTES # BLD AUTO: 0.3 K/UL (ref 0.3–1)
MONOCYTES NFR BLD: 7.8 % (ref 4–15)
NEUTROPHILS # BLD AUTO: 2 K/UL (ref 1.8–7.7)
NEUTROPHILS NFR BLD: 50.5 % (ref 38–73)
NONHDLC SERPL-MCNC: 139 MG/DL
NRBC BLD-RTO: 0 /100 WBC
PLATELET # BLD AUTO: 284 K/UL (ref 150–350)
PMV BLD AUTO: 10.2 FL (ref 9.2–12.9)
POTASSIUM SERPL-SCNC: 3.9 MMOL/L (ref 3.5–5.1)
PROT SERPL-MCNC: 8.2 G/DL (ref 6–8.4)
RBC # BLD AUTO: 3.95 M/UL (ref 4–5.4)
SODIUM SERPL-SCNC: 141 MMOL/L (ref 136–145)
T4 FREE SERPL-MCNC: 0.86 NG/DL (ref 0.71–1.51)
TRIGL SERPL-MCNC: 42 MG/DL (ref 30–150)
TSH SERPL DL<=0.005 MIU/L-ACNC: 1.08 UIU/ML (ref 0.4–4)
VIT B12 SERPL-MCNC: 447 PG/ML (ref 210–950)
WBC # BLD AUTO: 3.99 K/UL (ref 3.9–12.7)

## 2019-10-10 PROCEDURE — 3079F PR MOST RECENT DIASTOLIC BLOOD PRESSURE 80-89 MM HG: ICD-10-PCS | Mod: CPTII,S$GLB,, | Performed by: OBSTETRICS & GYNECOLOGY

## 2019-10-10 PROCEDURE — 80061 LIPID PANEL: CPT

## 2019-10-10 PROCEDURE — 3075F SYST BP GE 130 - 139MM HG: CPT | Mod: CPTII,S$GLB,, | Performed by: OBSTETRICS & GYNECOLOGY

## 2019-10-10 PROCEDURE — 82306 VITAMIN D 25 HYDROXY: CPT

## 2019-10-10 PROCEDURE — 99213 PR OFFICE/OUTPT VISIT, EST, LEVL III, 20-29 MIN: ICD-10-PCS | Mod: S$GLB,,, | Performed by: OBSTETRICS & GYNECOLOGY

## 2019-10-10 PROCEDURE — 83036 HEMOGLOBIN GLYCOSYLATED A1C: CPT

## 2019-10-10 PROCEDURE — 99395 PREV VISIT EST AGE 18-39: CPT | Mod: S$GLB,,, | Performed by: FAMILY MEDICINE

## 2019-10-10 PROCEDURE — 99999 PR PBB SHADOW E&M-EST. PATIENT-LVL III: ICD-10-PCS | Mod: PBBFAC,,, | Performed by: OBSTETRICS & GYNECOLOGY

## 2019-10-10 PROCEDURE — 84439 ASSAY OF FREE THYROXINE: CPT

## 2019-10-10 PROCEDURE — 99999 PR PBB SHADOW E&M-EST. PATIENT-LVL III: ICD-10-PCS | Mod: PBBFAC,,, | Performed by: FAMILY MEDICINE

## 2019-10-10 PROCEDURE — 3079F DIAST BP 80-89 MM HG: CPT | Mod: CPTII,S$GLB,, | Performed by: OBSTETRICS & GYNECOLOGY

## 2019-10-10 PROCEDURE — 99213 OFFICE O/P EST LOW 20 MIN: CPT | Mod: S$GLB,,, | Performed by: OBSTETRICS & GYNECOLOGY

## 2019-10-10 PROCEDURE — 3079F DIAST BP 80-89 MM HG: CPT | Mod: CPTII,S$GLB,, | Performed by: FAMILY MEDICINE

## 2019-10-10 PROCEDURE — 99999 PR PBB SHADOW E&M-EST. PATIENT-LVL III: CPT | Mod: PBBFAC,,, | Performed by: OBSTETRICS & GYNECOLOGY

## 2019-10-10 PROCEDURE — 3008F PR BODY MASS INDEX (BMI) DOCUMENTED: ICD-10-PCS | Mod: CPTII,S$GLB,, | Performed by: OBSTETRICS & GYNECOLOGY

## 2019-10-10 PROCEDURE — 3075F SYST BP GE 130 - 139MM HG: CPT | Mod: CPTII,S$GLB,, | Performed by: FAMILY MEDICINE

## 2019-10-10 PROCEDURE — 85025 COMPLETE CBC W/AUTO DIFF WBC: CPT

## 2019-10-10 PROCEDURE — 99395 PR PREVENTIVE VISIT,EST,18-39: ICD-10-PCS | Mod: S$GLB,,, | Performed by: FAMILY MEDICINE

## 2019-10-10 PROCEDURE — 36415 COLL VENOUS BLD VENIPUNCTURE: CPT

## 2019-10-10 PROCEDURE — 3075F PR MOST RECENT SYSTOLIC BLOOD PRESS GE 130-139MM HG: ICD-10-PCS | Mod: CPTII,S$GLB,, | Performed by: FAMILY MEDICINE

## 2019-10-10 PROCEDURE — 3008F BODY MASS INDEX DOCD: CPT | Mod: CPTII,S$GLB,, | Performed by: OBSTETRICS & GYNECOLOGY

## 2019-10-10 PROCEDURE — 3075F PR MOST RECENT SYSTOLIC BLOOD PRESS GE 130-139MM HG: ICD-10-PCS | Mod: CPTII,S$GLB,, | Performed by: OBSTETRICS & GYNECOLOGY

## 2019-10-10 PROCEDURE — 82607 VITAMIN B-12: CPT

## 2019-10-10 PROCEDURE — 3079F PR MOST RECENT DIASTOLIC BLOOD PRESSURE 80-89 MM HG: ICD-10-PCS | Mod: CPTII,S$GLB,, | Performed by: FAMILY MEDICINE

## 2019-10-10 PROCEDURE — 80053 COMPREHEN METABOLIC PANEL: CPT

## 2019-10-10 PROCEDURE — 99999 PR PBB SHADOW E&M-EST. PATIENT-LVL III: CPT | Mod: PBBFAC,,, | Performed by: FAMILY MEDICINE

## 2019-10-10 PROCEDURE — 84443 ASSAY THYROID STIM HORMONE: CPT

## 2019-10-10 NOTE — PROGRESS NOTES
"Chief complaint: Conception    Brianne Familia Blas is a 39 y.o. female  presents to discuss conception.    She reports cycles are regular, every 27 days.  Bleeding is mild to moderate.  She denies dysmenorrhea.  She has conceived spontaneously but pregnancies resulted in miscarriage.    She reports she and her partner have been trying to conceive x 1.  Yes is using OPK's that are positive.  She is taking PNV.    Her partner does have a child that is 24 years old.      Past Medical History:   Diagnosis Date    Fibroids     Hypertension     Migraine     Severe obesity (BMI 35.0-39.9) with comorbidity      Past Surgical History:   Procedure Laterality Date    MYOMECTOMY         OB History    Para Term  AB Living   2       2     SAB TAB Ectopic Multiple Live Births   2              # Outcome Date GA Lbr Padilla/2nd Weight Sex Delivery Anes PTL Lv   2 SAB 10/2016           1 SAB 2016               Breast Cancer-related family history includes Breast cancer in her maternal grandmother; Breast cancer (age of onset: 40) in her mother.    Social History     Tobacco Use    Smoking status: Never Smoker    Smokeless tobacco: Never Used   Substance Use Topics    Alcohol use: No     Frequency: Monthly or less     Drinks per session: 1 or 2     Binge frequency: Never    Drug use: No       /88   Ht 5' 7" (1.702 m)   Wt 100.7 kg (222 lb 0.1 oz)   LMP 10/04/2019   BMI 34.77 kg/m²     ROS:  GENERAL: No fever, chills, fatigability or weight loss.  VULVAR: No pain, no lesions and no itching.  VAGINAL: No relaxation, no itching, no discharge, no abnormal bleeding and no lesions.  ABDOMEN: No abdominal pain. Denies nausea. Denies vomiting. No diarrhea. No constipation  BREAST: Denies pain. No lumps. No discharge.  URINARY: No incontinence, no nocturia, no frequency and no dysuria.  CARDIOVASCULAR: No chest pain. No shortness of breath. No leg cramps.  NEUROLOGICAL: No headaches. No vision " changes.    Physical exam:    Deferred    1. Procreative counseling and advice using natural family planning          Discussed the diagnosis of infertility and evaluation.  Information for semen analysis given.      If within normal limits, patient to call on CD#1.  FSH, LH, TSH, prolactin, estradiol to be drawn on CD # 3, progesterone on CD #21.  HSG on CD #5-9.  Recommended continuing OPK's to help for timing of intercourse.

## 2019-10-10 NOTE — PROGRESS NOTES
"Subjective:      Patient ID: Brianne Blas is a 39 y.o. female.    Chief Complaint: Annual Exam    HPI   Patient here today for annual exam. She reports her mood is good, interest in hobbies there, anxiety controlled, no panic attacks, sleep is good, no SI or HI. She stopped labetalol over 10 months ago. She works out 5-6 days a week [employer]. She is here today with her .     Review of Systems   Constitutional: Negative for activity change, appetite change, chills, diaphoresis, fatigue, fever and unexpected weight change.   HENT: Negative for congestion, ear discharge, ear pain, hearing loss, postnasal drip, rhinorrhea, sinus pressure and sore throat.    Respiratory: Negative for cough, shortness of breath and wheezing.    Cardiovascular: Negative for chest pain.   Gastrointestinal: Negative for abdominal pain, constipation, diarrhea, nausea and vomiting.   Genitourinary: Negative for dysuria and frequency.   Musculoskeletal: Negative.    Psychiatric/Behavioral: Negative for suicidal ideas.     I personally reviewed Past Medical History, Past Surgical history,  Past Social History and Family History      Objective:   /86 (BP Location: Left arm, Patient Position: Sitting)   Pulse 68   Ht 5' 7" (1.702 m)   Wt 101.3 kg (223 lb 5.2 oz)   SpO2 100%   BMI 34.98 kg/m²     Physical Exam   Constitutional: She is oriented to person, place, and time. She appears well-developed and well-nourished. No distress.   HENT:   Head: Normocephalic and atraumatic.   Right Ear: Hearing, tympanic membrane, external ear and ear canal normal.   Left Ear: Hearing, tympanic membrane, external ear and ear canal normal.   Nose: Nose normal.   Mouth/Throat: Uvula is midline and oropharynx is clear and moist. No oropharyngeal exudate.   Eyes: Pupils are equal, round, and reactive to light. Conjunctivae and EOM are normal. Right eye exhibits no discharge. Left eye exhibits no discharge. No scleral icterus. "   Neck: Normal range of motion. Neck supple.   Cardiovascular: Normal rate, regular rhythm, normal heart sounds and intact distal pulses. Exam reveals no gallop.   No murmur heard.  Pulmonary/Chest: Effort normal and breath sounds normal. No respiratory distress. She has no wheezes. She has no rales. She exhibits no tenderness.   Abdominal: Soft. Bowel sounds are normal. She exhibits no distension and no mass. There is no tenderness. There is no rebound and no guarding.   Neurological: She is alert and oriented to person, place, and time.   Skin: Skin is warm and dry.   Vitals reviewed.      1. Annual physical exam    2. Essential hypertension    3. Severe obesity        1. Labs   2/3. stable, cont dietary and lifestyle management          Orders Placed This Encounter   Procedures    Comprehensive metabolic panel    CBC auto differential    Lipid panel    Hemoglobin A1c    Vitamin D    Vitamin B12    TSH    T4, free    MyChart Patient Entered Blood Pressure

## 2019-10-17 ENCOUNTER — PATIENT MESSAGE (OUTPATIENT)
Dept: INTERNAL MEDICINE | Facility: CLINIC | Age: 39
End: 2019-10-17

## 2019-10-18 ENCOUNTER — OFFICE VISIT (OUTPATIENT)
Dept: URGENT CARE | Facility: CLINIC | Age: 39
End: 2019-10-18
Payer: COMMERCIAL

## 2019-10-18 ENCOUNTER — PATIENT MESSAGE (OUTPATIENT)
Dept: INTERNAL MEDICINE | Facility: CLINIC | Age: 39
End: 2019-10-18

## 2019-10-18 VITALS
OXYGEN SATURATION: 100 % | DIASTOLIC BLOOD PRESSURE: 88 MMHG | SYSTOLIC BLOOD PRESSURE: 175 MMHG | WEIGHT: 222 LBS | HEART RATE: 68 BPM | TEMPERATURE: 98 F | RESPIRATION RATE: 16 BRPM | BODY MASS INDEX: 34.84 KG/M2 | HEIGHT: 67 IN

## 2019-10-18 DIAGNOSIS — J32.9 SINUSITIS, UNSPECIFIED CHRONICITY, UNSPECIFIED LOCATION: Primary | ICD-10-CM

## 2019-10-18 DIAGNOSIS — R03.0 ELEVATED BLOOD PRESSURE READING: ICD-10-CM

## 2019-10-18 PROCEDURE — 3077F PR MOST RECENT SYSTOLIC BLOOD PRESSURE >= 140 MM HG: ICD-10-PCS | Mod: CPTII,S$GLB,, | Performed by: NURSE PRACTITIONER

## 2019-10-18 PROCEDURE — 99214 OFFICE O/P EST MOD 30 MIN: CPT | Mod: S$GLB,,, | Performed by: NURSE PRACTITIONER

## 2019-10-18 PROCEDURE — 3008F BODY MASS INDEX DOCD: CPT | Mod: CPTII,S$GLB,, | Performed by: NURSE PRACTITIONER

## 2019-10-18 PROCEDURE — 3077F SYST BP >= 140 MM HG: CPT | Mod: CPTII,S$GLB,, | Performed by: NURSE PRACTITIONER

## 2019-10-18 PROCEDURE — 99214 PR OFFICE/OUTPT VISIT, EST, LEVL IV, 30-39 MIN: ICD-10-PCS | Mod: S$GLB,,, | Performed by: NURSE PRACTITIONER

## 2019-10-18 PROCEDURE — 3079F DIAST BP 80-89 MM HG: CPT | Mod: CPTII,S$GLB,, | Performed by: NURSE PRACTITIONER

## 2019-10-18 PROCEDURE — 3079F PR MOST RECENT DIASTOLIC BLOOD PRESSURE 80-89 MM HG: ICD-10-PCS | Mod: CPTII,S$GLB,, | Performed by: NURSE PRACTITIONER

## 2019-10-18 PROCEDURE — 3008F PR BODY MASS INDEX (BMI) DOCUMENTED: ICD-10-PCS | Mod: CPTII,S$GLB,, | Performed by: NURSE PRACTITIONER

## 2019-10-18 RX ORDER — AMOXICILLIN AND CLAVULANATE POTASSIUM 875; 125 MG/1; MG/1
1 TABLET, FILM COATED ORAL 2 TIMES DAILY
Qty: 20 TABLET | Refills: 0 | Status: SHIPPED | OUTPATIENT
Start: 2019-10-18 | End: 2019-10-28

## 2019-10-18 RX ORDER — ERGOCALCIFEROL 1.25 MG/1
50000 CAPSULE ORAL
Qty: 12 CAPSULE | Refills: 0 | Status: SHIPPED | OUTPATIENT
Start: 2019-10-18 | End: 2020-02-20

## 2019-10-18 NOTE — PROGRESS NOTES
"Subjective:       Patient ID: Brianne Blas is a 39 y.o. female.    Vitals:  height is 5' 7" (1.702 m) and weight is 100.7 kg (222 lb). Her oral temperature is 98.2 °F (36.8 °C). Her blood pressure is 175/88 (abnormal) and her pulse is 68. Her respiration is 16 and oxygen saturation is 100%.     Chief Complaint: Sinus Problem    Sinus Problem   This is a new problem. Episode onset: 5 days agp. The problem has been gradually worsening since onset. There has been no fever. Her pain is at a severity of 6/10. The pain is moderate. Associated symptoms include chills, congestion, coughing, headaches, a hoarse voice, sinus pressure and a sore throat. Pertinent negatives include no diaphoresis, ear pain, neck pain, shortness of breath, sneezing or swollen glands. Treatments tried: thera flu, robitussin. The treatment provided no relief.       Constitution: Positive for chills. Negative for sweating, fatigue and fever.   HENT: Positive for congestion, sinus pressure and sore throat. Negative for ear pain, sinus pain and voice change.    Neck: Negative for neck pain and painful lymph nodes.   Eyes: Negative for eye redness.   Respiratory: Positive for cough and sputum production. Negative for chest tightness, bloody sputum, COPD, shortness of breath, stridor, wheezing and asthma.    Gastrointestinal: Negative for nausea and vomiting.   Musculoskeletal: Negative for muscle ache.   Skin: Negative for rash.   Allergic/Immunologic: Negative for seasonal allergies, asthma and sneezing.   Neurological: Positive for headaches.   Hematologic/Lymphatic: Negative for swollen lymph nodes.       Objective:      Physical Exam   Constitutional: She is oriented to person, place, and time. She appears well-developed and well-nourished. She is cooperative.  Non-toxic appearance. She does not have a sickly appearance. She does not appear ill. No distress.   HENT:   Head: Normocephalic and atraumatic.   Right Ear: Hearing, tympanic " membrane, external ear and ear canal normal.   Left Ear: Hearing, tympanic membrane, external ear and ear canal normal.   Nose: Mucosal edema and rhinorrhea present. No nasal deformity. No epistaxis. Right sinus exhibits no maxillary sinus tenderness and no frontal sinus tenderness. Left sinus exhibits no maxillary sinus tenderness and no frontal sinus tenderness.   Mouth/Throat: Uvula is midline and mucous membranes are normal. No trismus in the jaw. Normal dentition. No uvula swelling. Posterior oropharyngeal erythema present. No oropharyngeal exudate, posterior oropharyngeal edema, tonsillar abscesses or cobblestoning.   Eyes: Conjunctivae and lids are normal. No scleral icterus.   Neck: Trachea normal, full passive range of motion without pain and phonation normal. Neck supple. No neck rigidity. No edema and no erythema present.   Cardiovascular: Normal rate, regular rhythm, normal heart sounds, intact distal pulses and normal pulses.   Pulmonary/Chest: Effort normal and breath sounds normal. No respiratory distress. She has no decreased breath sounds. She has no rhonchi.   Abdominal: Normal appearance.   Musculoskeletal: Normal range of motion. She exhibits no edema or deformity.   Neurological: She is alert and oriented to person, place, and time. She exhibits normal muscle tone. Coordination normal.   Skin: Skin is warm, dry, intact, not diaphoretic and not pale.   Psychiatric: She has a normal mood and affect. Her speech is normal and behavior is normal. Judgment and thought content normal. Cognition and memory are normal.   Nursing note and vitals reviewed.        Assessment:       1. Sinusitis, unspecified chronicity, unspecified location    2. Elevated blood pressure reading        Plan:         Sinusitis, unspecified chronicity, unspecified location  -     amoxicillin-clavulanate 875-125mg (AUGMENTIN) 875-125 mg per tablet; Take 1 tablet by mouth 2 (two) times daily. for 10 days  Dispense: 20 tablet;  "Refill: 0    Elevated blood pressure reading      Patient Instructions     Please follow up with your Primary care provider within 2-5 days if your signs and symptoms have not resolved or worsen.  The usual course of cold symptoms are 10-14 days.     If your condition worsens or fails to improve we recommend that you receive another evaluation at the emergency room immediately or contact your primary medical clinic to discuss your concerns.     You must understand that you have received an Urgent Care treatment only and that you may be released before all of your medical problems are known or treated.   You, the patient, will arrange for follow up care as instructed.     Tylenol or Ibuprofen can also be used as directed for pain/fever unless you have an allergy to them or medical condition such as stomach ulcers, kidney or liver disease or blood thinners etc for which you should not be taking these type of medications.     Take over the counter cough medication as directed as needed for cough.  You should avoid medications with pseudoephedrine or phenylephrine (any medication with "D") if you have high blood pressure as this can cause an elevation in your blood pressure. Instead consider Corcidin HBP as needed to prevent an elevated blood pressure.     Natural remedies of symptoms (as needed) include humidification, saline nasal sprays, and/or steamy showers.  Increase fluids, warm tea with honey, cough drops as needed.  You may also use salt water gargles for sore throat.    IF you received a steroid shot today - As discussed, this can elevate your blood pressure, elevate your blood sugar, water weight gain, nervous energy, redness to the face and dimpling of the skin at the injection site.       Wait and See Antibiotic    You have been given a "wait and see" antibiotic. You should wait to see if symptoms improve within the next 48-72 hours before you start the antibiotic.      It is important to follow these " instructions as antibiotic resistance is high.  Your symptoms will likely resolve without this prescription.      If you do start the antibiotic, please complete the antibiotic as directed on the bottle.      If you are female and on BCP use additional methods to prevent pregnancy while on antibiotics and for one cycle after.     You have been given an antibiotic to treat your condition today.  Please complete the antibiotic as directed on the bottle.     As with any antibiotics, use probiotics and/or high culture yogurt about 2 hours apart from the antibiotic and about 1 week after the antibiotic to replace the gut charles lost with antibiotic use.      If you are female and on BCP use additional methods to prevent pregnancy while on antibiotics and for one cycle after.     Elevated Blood Pressure    Your blood pressure was elevated during your visit to the urgent care.     It was not so high that immediate care was needed but it is recommended that you monitor your blood pressure over the next week or two to make sure that it is not staying elevated.      Please have your blood pressure taken 2-3 times daily at different times of the day.  Write all of those blood pressures down and record the time that they were taken.     Keep all that information and take it with you to see your Primary Care Physician.     If you are taking antihypertensives, please continue your medication regularly as prescribed.      If your blood pressure is consistently above 140/90 you will need to follow up with your PCP more quickly.     - According to ACEP guidelines, workup and treatment of hypertension in asymptomatic patient is not warranted in the ED:    (1) Initiating treatment for asymptomatic hypertension in the ED is not necessary when patients have follow-up.    (2) Rapidly lowering blood pressure in asymptomatic patients in the ED is unnecessary and may be harmful in some patients.    (3) When ED treatment for asymptomatic  hypertension is initiated, blood pressure management should attempt to gradually lower blood pressure and should not be expected to be normalized during the initial ED visit.        Call your doctor immediately or seek emergency care if you have any of the following:  · Chest pain or shortness of breath (call 911)  · Moderate to severe headache  · Weakness in the muscles of your face, arms, or legs  · Trouble speaking  · Extreme drowsiness  · Confusion  · Fainting or dizziness  · Pulsating or rushing sound in your ears  · Unexplained nosebleed  · Weakness, tingling, or numbness of your face, arms, or legs  · Change in vision  · Blood pressure measured at home that is greater than 180/110     Please consider DASH diet program (National [place] [employer] Web site) for patients with hypertension as it is the only diet approved to improve blood pressures:     https://www.nhlbi.nih.gov/health-topics/dash-eating-plan      Sinusitis (Antibiotic Treatment)    The sinuses are air-filled spaces within the bones of the face. They connect to the inside of the nose. Sinusitis is an inflammation of the tissue lining the sinus cavity. Sinus inflammation can occur during a cold. It can also be due to allergies to pollens and other particles in the air. Sinusitis can cause symptoms of sinus congestion and fullness. A sinus infection causes fever, headache and facial pain. There is often green or yellow drainage from the nose or into the back of the throat (post-nasal drip). You have been given antibiotics to treat this condition.  Home care:  · Take the full course of antibiotics as instructed. Do not stop taking them, even if you feel better.  · Drink plenty of water, hot tea, and other liquids. This may help thin mucus. It also may promote sinus drainage.  · Heat may help soothe painful areas of the face. Use a towel soaked in hot water. Or,  the shower and direct the hot spray onto your face. Using a vaporizer along  with a menthol rub at night may also help.   · An expectorant containing guaifenesin may help thin the mucus and promote drainage from the sinuses.  · Over-the-counter decongestants may be used unless a similar medicine was prescribed. Nasal sprays work the fastest. Use one that contains phenylephrine or oxymetazoline. First blow the nose gently. Then use the spray. Do not use these medicines more often than directed on the label or symptoms may get worse. You may also use tablets containing pseudoephedrine. Avoid products that combine ingredients, because side effects may be increased. Read labels. You can also ask the pharmacist for help. (NOTE: Persons with high blood pressure should not use decongestants. They can raise blood pressure.)  · Over-the-counter antihistamines may help if allergies contributed to your sinusitis.    · Do not use nasal rinses or irrigation during an acute sinus infection, unless told to by your health care provider. Rinsing may spread the infection to other sinuses.  · Use acetaminophen or ibuprofen to control pain, unless another pain medicine was prescribed. (If you have chronic liver or kidney disease or ever had a stomach ulcer, talk with your doctor before using these medicines. Aspirin should never be used in anyone under 18 years of age who is ill with a fever. It may cause severe liver damage.)  · Don't smoke. This can worsen symptoms.  Follow-up care  Follow up with your healthcare provider or our staff if you are not improving within the next week.  When to seek medical advice  Call your healthcare provider if any of these occur:  · Facial pain or headache becoming more severe  · Stiff neck  · Unusual drowsiness or confusion  · Swelling of the forehead or eyelids  · Vision problems, including blurred or double vision  · Fever of 100.4ºF (38ºC) or higher, or as directed by your healthcare provider  · Seizure  · Breathing problems  · Symptoms not resolving within 10 days  Date  Last Reviewed: 4/13/2015  © 6279-5880 [employer], [employer]. [address] [place] [address], [place] [address]. All rights reserved. This information is not intended as a substitute for professional medical care. Always follow your healthcare professional's instructions.

## 2019-10-18 NOTE — PATIENT INSTRUCTIONS
"Please follow up with your Primary care provider within 2-5 days if your signs and symptoms have not resolved or worsen.  The usual course of cold symptoms are 10-14 days.     If your condition worsens or fails to improve we recommend that you receive another evaluation at the emergency room immediately or contact your primary medical clinic to discuss your concerns.     You must understand that you have received an Urgent Care treatment only and that you may be released before all of your medical problems are known or treated.   You, the patient, will arrange for follow up care as instructed.     Tylenol or Ibuprofen can also be used as directed for pain/fever unless you have an allergy to them or medical condition such as stomach ulcers, kidney or liver disease or blood thinners etc for which you should not be taking these type of medications.     Take over the counter cough medication as directed as needed for cough.  You should avoid medications with pseudoephedrine or phenylephrine (any medication with "D") if you have high blood pressure as this can cause an elevation in your blood pressure. Instead consider Corcidin HBP as needed to prevent an elevated blood pressure.     Natural remedies of symptoms (as needed) include humidification, saline nasal sprays, and/or steamy showers.  Increase fluids, warm tea with honey, cough drops as needed.  You may also use salt water gargles for sore throat.    IF you received a steroid shot today - As discussed, this can elevate your blood pressure, elevate your blood sugar, water weight gain, nervous energy, redness to the face and dimpling of the skin at the injection site.       Wait and See Antibiotic    You have been given a "wait and see" antibiotic. You should wait to see if symptoms improve within the next 48-72 hours before you start the antibiotic.      It is important to follow these instructions as antibiotic resistance is high.  Your symptoms will likely " resolve without this prescription.      If you do start the antibiotic, please complete the antibiotic as directed on the bottle.      If you are female and on BCP use additional methods to prevent pregnancy while on antibiotics and for one cycle after.     You have been given an antibiotic to treat your condition today.  Please complete the antibiotic as directed on the bottle.     As with any antibiotics, use probiotics and/or high culture yogurt about 2 hours apart from the antibiotic and about 1 week after the antibiotic to replace the gut charles lost with antibiotic use.      If you are female and on BCP use additional methods to prevent pregnancy while on antibiotics and for one cycle after.     Elevated Blood Pressure    Your blood pressure was elevated during your visit to the urgent care.     It was not so high that immediate care was needed but it is recommended that you monitor your blood pressure over the next week or two to make sure that it is not staying elevated.      Please have your blood pressure taken 2-3 times daily at different times of the day.  Write all of those blood pressures down and record the time that they were taken.     Keep all that information and take it with you to see your Primary Care Physician.     If you are taking antihypertensives, please continue your medication regularly as prescribed.      If your blood pressure is consistently above 140/90 you will need to follow up with your PCP more quickly.     - According to ACEP guidelines, workup and treatment of hypertension in asymptomatic patient is not warranted in the ED:    (1) Initiating treatment for asymptomatic hypertension in the ED is not necessary when patients have follow-up.    (2) Rapidly lowering blood pressure in asymptomatic patients in the ED is unnecessary and may be harmful in some patients.    (3) When ED treatment for asymptomatic hypertension is initiated, blood pressure management should attempt to  gradually lower blood pressure and should not be expected to be normalized during the initial ED visit.        Call your doctor immediately or seek emergency care if you have any of the following:  · Chest pain or shortness of breath (call 911)  · Moderate to severe headache  · Weakness in the muscles of your face, arms, or legs  · Trouble speaking  · Extreme drowsiness  · Confusion  · Fainting or dizziness  · Pulsating or rushing sound in your ears  · Unexplained nosebleed  · Weakness, tingling, or numbness of your face, arms, or legs  · Change in vision  · Blood pressure measured at home that is greater than 180/110     Please consider DASH diet program (National [place] [employer] Web site) for patients with hypertension as it is the only diet approved to improve blood pressures:     https://www.nhlbi.nih.gov/health-topics/dash-eating-plan      Sinusitis (Antibiotic Treatment)    The sinuses are air-filled spaces within the bones of the face. They connect to the inside of the nose. Sinusitis is an inflammation of the tissue lining the sinus cavity. Sinus inflammation can occur during a cold. It can also be due to allergies to pollens and other particles in the air. Sinusitis can cause symptoms of sinus congestion and fullness. A sinus infection causes fever, headache and facial pain. There is often green or yellow drainage from the nose or into the back of the throat (post-nasal drip). You have been given antibiotics to treat this condition.  Home care:  · Take the full course of antibiotics as instructed. Do not stop taking them, even if you feel better.  · Drink plenty of water, hot tea, and other liquids. This may help thin mucus. It also may promote sinus drainage.  · Heat may help soothe painful areas of the face. Use a towel soaked in hot water. Or,  the shower and direct the hot spray onto your face. Using a vaporizer along with a menthol rub at night may also help.   · An expectorant containing  guaifenesin may help thin the mucus and promote drainage from the sinuses.  · Over-the-counter decongestants may be used unless a similar medicine was prescribed. Nasal sprays work the fastest. Use one that contains phenylephrine or oxymetazoline. First blow the nose gently. Then use the spray. Do not use these medicines more often than directed on the label or symptoms may get worse. You may also use tablets containing pseudoephedrine. Avoid products that combine ingredients, because side effects may be increased. Read labels. You can also ask the pharmacist for help. (NOTE: Persons with high blood pressure should not use decongestants. They can raise blood pressure.)  · Over-the-counter antihistamines may help if allergies contributed to your sinusitis.    · Do not use nasal rinses or irrigation during an acute sinus infection, unless told to by your health care provider. Rinsing may spread the infection to other sinuses.  · Use acetaminophen or ibuprofen to control pain, unless another pain medicine was prescribed. (If you have chronic liver or kidney disease or ever had a stomach ulcer, talk with your doctor before using these medicines. Aspirin should never be used in anyone under 18 years of age who is ill with a fever. It may cause severe liver damage.)  · Don't smoke. This can worsen symptoms.  Follow-up care  Follow up with your healthcare provider or our staff if you are not improving within the next week.  When to seek medical advice  Call your healthcare provider if any of these occur:  · Facial pain or headache becoming more severe  · Stiff neck  · Unusual drowsiness or confusion  · Swelling of the forehead or eyelids  · Vision problems, including blurred or double vision  · Fever of 100.4ºF (38ºC) or higher, or as directed by your healthcare provider  · Seizure  · Breathing problems  · Symptoms not resolving within 10 days  Date Last Reviewed: 4/13/2015  © 7535-6660 [employer] [address]  [place] [address]. All rights reserved. This information is not intended as a substitute for professional medical care. Always follow your healthcare professional's instructions.

## 2019-10-18 NOTE — LETTER
October 18, 2019      [facility]  [address] [address], [address]  [place] [address]  Phone: [phone]  Fax: [phone]       Patient: Brianne Blas   YOB: 1980  Date of Visit: 10/18/2019    To Whom It May Concern:    Shania Blas  was at [facility] on 10/18/2019. She may return to work/school on 10/21/2019 with no restrictions. If you have any questions or concerns, or if I can be of further assistance, please do not hesitate to contact me.    Sincerely,      Meeta Flower NP

## 2019-10-21 ENCOUNTER — TELEPHONE (OUTPATIENT)
Dept: URGENT CARE | Facility: CLINIC | Age: 39
End: 2019-10-21

## 2020-02-20 RX ORDER — ERGOCALCIFEROL 1.25 MG/1
CAPSULE ORAL
Qty: 12 CAPSULE | Refills: 2 | Status: SHIPPED | OUTPATIENT
Start: 2020-02-20 | End: 2020-11-17

## 2020-11-03 ENCOUNTER — PATIENT MESSAGE (OUTPATIENT)
Dept: OBSTETRICS AND GYNECOLOGY | Facility: CLINIC | Age: 40
End: 2020-11-03

## 2020-11-04 ENCOUNTER — PATIENT MESSAGE (OUTPATIENT)
Dept: OBSTETRICS AND GYNECOLOGY | Facility: CLINIC | Age: 40
End: 2020-11-04

## 2020-11-17 ENCOUNTER — OFFICE VISIT (OUTPATIENT)
Dept: OBSTETRICS AND GYNECOLOGY | Facility: CLINIC | Age: 40
End: 2020-11-17
Payer: COMMERCIAL

## 2020-11-17 ENCOUNTER — LAB VISIT (OUTPATIENT)
Dept: LAB | Facility: OTHER | Age: 40
End: 2020-11-17
Attending: PHYSICIAN ASSISTANT
Payer: COMMERCIAL

## 2020-11-17 VITALS — WEIGHT: 236.25 LBS | BODY MASS INDEX: 37 KG/M2 | SYSTOLIC BLOOD PRESSURE: 128 MMHG | DIASTOLIC BLOOD PRESSURE: 88 MMHG

## 2020-11-17 DIAGNOSIS — Z01.419 WOMEN'S ANNUAL ROUTINE GYNECOLOGICAL EXAMINATION: Primary | ICD-10-CM

## 2020-11-17 DIAGNOSIS — Z11.3 SCREEN FOR STD (SEXUALLY TRANSMITTED DISEASE): ICD-10-CM

## 2020-11-17 DIAGNOSIS — N63.10 BREAST MASS, RIGHT: ICD-10-CM

## 2020-11-17 DIAGNOSIS — Z12.4 ENCOUNTER FOR PAPANICOLAOU SMEAR FOR CERVICAL CANCER SCREENING: ICD-10-CM

## 2020-11-17 DIAGNOSIS — Z80.3 FAMILY HISTORY OF BREAST CANCER: ICD-10-CM

## 2020-11-17 DIAGNOSIS — Z11.51 SCREENING FOR HUMAN PAPILLOMAVIRUS (HPV): ICD-10-CM

## 2020-11-17 PROCEDURE — 99999 PR PBB SHADOW E&M-EST. PATIENT-LVL III: CPT | Mod: PBBFAC,,, | Performed by: PHYSICIAN ASSISTANT

## 2020-11-17 PROCEDURE — 99999 PR PBB SHADOW E&M-EST. PATIENT-LVL III: ICD-10-PCS | Mod: PBBFAC,,, | Performed by: PHYSICIAN ASSISTANT

## 2020-11-17 PROCEDURE — 87340 HEPATITIS B SURFACE AG IA: CPT

## 2020-11-17 PROCEDURE — 3074F SYST BP LT 130 MM HG: CPT | Mod: CPTII,S$GLB,, | Performed by: PHYSICIAN ASSISTANT

## 2020-11-17 PROCEDURE — 3008F PR BODY MASS INDEX (BMI) DOCUMENTED: ICD-10-PCS | Mod: CPTII,S$GLB,, | Performed by: PHYSICIAN ASSISTANT

## 2020-11-17 PROCEDURE — 86592 SYPHILIS TEST NON-TREP QUAL: CPT

## 2020-11-17 PROCEDURE — 86803 HEPATITIS C AB TEST: CPT

## 2020-11-17 PROCEDURE — 88175 CYTOPATH C/V AUTO FLUID REDO: CPT

## 2020-11-17 PROCEDURE — 3079F DIAST BP 80-89 MM HG: CPT | Mod: CPTII,S$GLB,, | Performed by: PHYSICIAN ASSISTANT

## 2020-11-17 PROCEDURE — 3074F PR MOST RECENT SYSTOLIC BLOOD PRESSURE < 130 MM HG: ICD-10-PCS | Mod: CPTII,S$GLB,, | Performed by: PHYSICIAN ASSISTANT

## 2020-11-17 PROCEDURE — 3079F PR MOST RECENT DIASTOLIC BLOOD PRESSURE 80-89 MM HG: ICD-10-PCS | Mod: CPTII,S$GLB,, | Performed by: PHYSICIAN ASSISTANT

## 2020-11-17 PROCEDURE — 87624 HPV HI-RISK TYP POOLED RSLT: CPT

## 2020-11-17 PROCEDURE — 99395 PR PREVENTIVE VISIT,EST,18-39: ICD-10-PCS | Mod: S$GLB,,, | Performed by: PHYSICIAN ASSISTANT

## 2020-11-17 PROCEDURE — 36415 COLL VENOUS BLD VENIPUNCTURE: CPT

## 2020-11-17 PROCEDURE — 3008F BODY MASS INDEX DOCD: CPT | Mod: CPTII,S$GLB,, | Performed by: PHYSICIAN ASSISTANT

## 2020-11-17 PROCEDURE — 99395 PREV VISIT EST AGE 18-39: CPT | Mod: S$GLB,,, | Performed by: PHYSICIAN ASSISTANT

## 2020-11-17 PROCEDURE — 1125F AMNT PAIN NOTED PAIN PRSNT: CPT | Mod: S$GLB,,, | Performed by: PHYSICIAN ASSISTANT

## 2020-11-17 PROCEDURE — 86703 HIV-1/HIV-2 1 RESULT ANTBDY: CPT

## 2020-11-17 PROCEDURE — 1125F PR PAIN SEVERITY QUANTIFIED, PAIN PRESENT: ICD-10-PCS | Mod: S$GLB,,, | Performed by: PHYSICIAN ASSISTANT

## 2020-11-17 RX ORDER — IBUPROFEN 800 MG/1
800 TABLET ORAL 3 TIMES DAILY
Status: ON HOLD | COMMUNITY
Start: 2020-10-25 | End: 2023-11-04 | Stop reason: HOSPADM

## 2020-11-17 NOTE — PROGRESS NOTES
HISTORY OF PRESENT ILLNESS:    Brianne Blas is a 40 y.o. female, , Patient's last menstrual period was 2020.,  presents for a routine exam. PAP DUE. She does not use contraception. She does want STD screening.  Pt reports right breast pain for a month. States she noticed a mass 2 weeks ago. States pain is gradually worsening, describes as a constant ache with severity of 6/10. States ibuprofen helps, wears supportive bras. States she works out regularly and thought it may have been from working out.  The patient participates in regular exercise: yes.  The patient does not smoke.  The patient wears seatbelts.   Pt denies any domestic violence.     FH:  Breast cancer: +    Mother: 2x, early 40s and late 40s (+BRCA)    MGma: age unknown   Colon cancer: +    MAunt 40s?   Ovarian cancer: none  Endometrial cancer: none    Past Medical History:   Diagnosis Date    Fibroids     Hypertension     Migraine     Severe obesity (BMI 35.0-39.9) with comorbidity        Past Surgical History:   Procedure Laterality Date    MYOMECTOMY         MEDICATIONS AND ALLERGIES:      Current Outpatient Medications:     Lactobacillus rhamnosus GG (CULTURELLE) 10 billion cell capsule, Take 1 capsule by mouth once daily., Disp: , Rfl:     PRENATAL VIT CALC,IRON,FOLIC (PRENATAL VITAMIN ORAL), Take by mouth., Disp: , Rfl:     ergocalciferol, vitamin D2, (VITAMIN D ORAL), , Disp: , Rfl:     ibuprofen (ADVIL,MOTRIN) 800 MG tablet, Take 800 mg by mouth 3 (three) times daily., Disp: , Rfl:     Review of patient's allergies indicates:   Allergen Reactions    Nifedipine Edema       Family History   Problem Relation Age of Onset    Breast cancer Maternal Grandmother     Hypertension Father     Breast cancer Mother 40        Breast Cancer x 2    Cancer Mother     Colon cancer Neg Hx     Ovarian cancer Neg Hx        Social History     Socioeconomic History    Marital status:      Spouse name: Not on file     Number of children: Not on file    Years of education: Not on file    Highest education level: Not on file   Occupational History    Occupation:     Social Needs    Financial resource strain: Not hard at all    Food insecurity     Worry: Not on file     Inability: Never true    Transportation needs     Medical: No     Non-medical: No   Tobacco Use    Smoking status: Never Smoker    Smokeless tobacco: Never Used   Substance and Sexual Activity    Alcohol use: No     Frequency: Monthly or less     Drinks per session: 1 or 2     Binge frequency: Never    Drug use: No    Sexual activity: Yes     Partners: Male     Birth control/protection: None   Lifestyle    Physical activity     Days per week: 5 days     Minutes per session: 50 min    Stress: Not at all   Relationships    Social connections     Talks on phone: More than three times a week     Gets together: More than three times a week     Attends [employer] service: Not on file     Active member of club or organization: No     Attends meetings of clubs or organizations: Not on file     Relationship status: Living with partner   Other Topics Concern    Not on file   Social History Narrative    Not on file       COMPREHENSIVE GYN HISTORY:  PAP History: Denies abnormal Paps.  Infection History: Denies STDs. Denies PID.  Benign History:  Denies ovarian cysts. Denies endometriosis. Denies other conditions. +H/O FIBROIDS  Cancer History: Denies cervical cancer. Denies uterine cancer or hyperplasia. Denies ovarian cancer. Denies vulvar cancer or pre-cancer. Denies vaginal cancer or pre-cancer. Denies breast cancer. Denies colon cancer.  Sexual Activity History: Reports currently being sexually active  Menstrual History: Monthly, mild-moderate.  Contraception:no method    ROS:  GENERAL: No weight changes. No swelling. No fatigue. No fever.  CARDIOVASCULAR: No chest pain. No shortness of breath. No leg cramps.   NEUROLOGICAL: No  headaches. No vision changes.  BREASTS:  No discharge. +PAIN, LUMP  ABDOMEN: No pain. No nausea. No vomiting. No diarrhea. No constipation.  REPRODUCTIVE: No abnormal bleeding.   VULVA: No pain. No lesions. No itching.  VAGINA: No relaxation. No itching. No odor. No discharge. No lesions.  URINARY: No incontinence. No nocturia. No frequency. No dysuria.    /88   Wt 107.2 kg (236 lb 3.6 oz)   LMP 11/06/2020   BMI 37.00 kg/m²     PE:  APPEARANCE: Well nourished, well developed, in no acute distress.  AFFECT: WNL, alert and oriented x 3.  SKIN: No acne or hirsutism.  NECK: Neck symmetric, without masses or thyromegaly.  NODES: No inguinal, cervical, axillary or femoral lymph node enlargement.  CHEST: Good respiratory effort.   ABDOMEN: Soft. No tenderness or masses. No hepatosplenomegaly. No hernias.  BREASTS: Symmetrical, no skin changes, visible lesions, palpable masses or nipple discharge bilaterally.  PELVIC: External female genitalia without lesions.  Female hair distribution. Adequate perineal body, Normal urethral meatus. Vagina moist and well rugated without lesions or discharge.  No significant cystocele or rectocele present. Cervix pink without lesions, discharge or tenderness. Uterus is normal size, regular, mobile and nontender. Adnexa without masses or tenderness.  EXTREMITIES: No edema    PROCEDURES:  Pap/HPV    DIAGNOSIS:  1. Women's annual routine gynecological examination     2. Breast mass, right  Mammo Digital Diagnostic Right with [facility]   3. Encounter for Papanicolaou smear for cervical cancer screening  Liquid-Based Pap Smear, Screening   4. Screening for human papillomavirus (HPV)  HPV High Risk Genotypes, PCR   5. Screen for STD (sexually transmitted disease)  HPV High Risk Genotypes, PCR    NuSwab Vaginitis Plus (VG+)    HIV-1 and HIV-2 antibodies    RPR    Hepatitis B surface antigen    Hepatitis C Antibody   6. Family history of breast cancer  Ambulatory referral/consult to Breast  Surgery       LABS AND TESTS ORDERED:  STD SCREENING  MAMMOGRAM RIGHT BREAST     MEDICATIONS PRESCRIBED:    COUNSELING:  The patient was counseled today on ACS PAP guidelines, with recommendations for yearly pelvic exams unless their uterus, cervix, and ovaries were removed for benign reasons; in that case, examinations every 3-5 years are recommended.  The patient was also counseled regarding monthly breast self-examination, routine STD screening for at-risk populations, prophylactic immunizations for transmitted infections such as  HPV, Pertussis, or Influenza as appropriate, and yearly mammograms when indicated by ACS guidelines.  She was advised to see her primary care physician for all other health maintenance.    FOLLOW-UP with me annually.

## 2020-11-18 LAB
HBV SURFACE AG SERPL QL IA: NEGATIVE
HCV AB SERPL QL IA: NEGATIVE
HIV 1+2 AB+HIV1 P24 AG SERPL QL IA: NEGATIVE
RPR SER QL: NORMAL

## 2020-11-20 ENCOUNTER — PATIENT MESSAGE (OUTPATIENT)
Dept: OBSTETRICS AND GYNECOLOGY | Facility: CLINIC | Age: 40
End: 2020-11-20

## 2020-11-24 ENCOUNTER — HOSPITAL ENCOUNTER (OUTPATIENT)
Dept: RADIOLOGY | Facility: HOSPITAL | Age: 40
Discharge: HOME | End: 2020-11-24
Attending: PHYSICIAN ASSISTANT
Payer: COMMERCIAL

## 2020-11-24 ENCOUNTER — PATIENT MESSAGE (OUTPATIENT)
Dept: OBSTETRICS AND GYNECOLOGY | Facility: CLINIC | Age: 40
End: 2020-11-24

## 2020-11-24 DIAGNOSIS — N63.0 BREAST MASS: ICD-10-CM

## 2020-11-24 DIAGNOSIS — N63.10 BREAST MASS, RIGHT: ICD-10-CM

## 2020-11-24 LAB
HPV HR 12 DNA SPEC QL NAA+PROBE: NEGATIVE
HPV16 AG SPEC QL: NEGATIVE
HPV18 DNA SPEC QL NAA+PROBE: NEGATIVE

## 2020-11-24 PROCEDURE — 77066 MAMMO DIGITAL DIAGNOSTIC BILAT WITH TOMO: ICD-10-PCS | Mod: 26,,, | Performed by: RADIOLOGY

## 2020-11-24 PROCEDURE — 77066 DX MAMMO INCL CAD BI: CPT | Mod: 26,,, | Performed by: RADIOLOGY

## 2020-11-24 PROCEDURE — 77062 MAMMO DIGITAL DIAGNOSTIC BILAT WITH TOMO: ICD-10-PCS | Mod: 26,,, | Performed by: RADIOLOGY

## 2020-11-24 PROCEDURE — 77066 DX MAMMO INCL CAD BI: CPT | Mod: TC

## 2020-11-24 PROCEDURE — 76642 ULTRASOUND BREAST LIMITED: CPT | Mod: 26,LT,, | Performed by: RADIOLOGY

## 2020-11-24 PROCEDURE — 76642 ULTRASOUND BREAST LIMITED: CPT | Mod: 26,RT,, | Performed by: RADIOLOGY

## 2020-11-24 PROCEDURE — 76642 ULTRASOUND BREAST LIMITED: CPT | Mod: TC,50

## 2020-11-24 PROCEDURE — 77062 BREAST TOMOSYNTHESIS BI: CPT | Mod: 26,,, | Performed by: RADIOLOGY

## 2020-11-24 PROCEDURE — 76642 US BREAST BILATERAL LIMITED: ICD-10-PCS | Mod: 26,LT,, | Performed by: RADIOLOGY

## 2020-11-27 ENCOUNTER — PATIENT MESSAGE (OUTPATIENT)
Dept: OBSTETRICS AND GYNECOLOGY | Facility: CLINIC | Age: 40
End: 2020-11-27

## 2020-12-31 LAB
FINAL PATHOLOGIC DIAGNOSIS: NORMAL
Lab: NORMAL

## 2021-04-09 ENCOUNTER — OFFICE VISIT (OUTPATIENT)
Dept: FAMILY MEDICINE | Facility: CLINIC | Age: 41
End: 2021-04-09
Payer: COMMERCIAL

## 2021-04-09 ENCOUNTER — LAB VISIT (OUTPATIENT)
Dept: LAB | Facility: HOSPITAL | Age: 41
End: 2021-04-09
Attending: STUDENT IN AN ORGANIZED HEALTH CARE EDUCATION/TRAINING PROGRAM
Payer: COMMERCIAL

## 2021-04-09 VITALS
TEMPERATURE: 99 F | SYSTOLIC BLOOD PRESSURE: 172 MMHG | WEIGHT: 244.5 LBS | OXYGEN SATURATION: 98 % | HEART RATE: 76 BPM | HEIGHT: 67 IN | BODY MASS INDEX: 38.37 KG/M2 | DIASTOLIC BLOOD PRESSURE: 110 MMHG

## 2021-04-09 DIAGNOSIS — G43.109 MIGRAINE WITH AURA AND WITHOUT STATUS MIGRAINOSUS, NOT INTRACTABLE: ICD-10-CM

## 2021-04-09 DIAGNOSIS — I10 ESSENTIAL HYPERTENSION: ICD-10-CM

## 2021-04-09 DIAGNOSIS — Z00.00 WELL ADULT EXAM: ICD-10-CM

## 2021-04-09 DIAGNOSIS — Z00.00 WELL ADULT EXAM: Primary | ICD-10-CM

## 2021-04-09 LAB
25(OH)D3+25(OH)D2 SERPL-MCNC: 15 NG/ML (ref 30–96)
ALBUMIN SERPL BCP-MCNC: 3.6 G/DL (ref 3.5–5.2)
ALP SERPL-CCNC: 56 U/L (ref 55–135)
ALT SERPL W/O P-5'-P-CCNC: 28 U/L (ref 10–44)
ANION GAP SERPL CALC-SCNC: 6 MMOL/L (ref 8–16)
AST SERPL-CCNC: 25 U/L (ref 10–40)
BASOPHILS # BLD AUTO: 0.03 K/UL (ref 0–0.2)
BASOPHILS NFR BLD: 0.7 % (ref 0–1.9)
BILIRUB SERPL-MCNC: 0.5 MG/DL (ref 0.1–1)
BUN SERPL-MCNC: 11 MG/DL (ref 6–20)
CALCIUM SERPL-MCNC: 9.1 MG/DL (ref 8.7–10.5)
CHLORIDE SERPL-SCNC: 107 MMOL/L (ref 95–110)
CHOLEST SERPL-MCNC: 175 MG/DL (ref 120–199)
CHOLEST/HDLC SERPL: 3.3 {RATIO} (ref 2–5)
CO2 SERPL-SCNC: 26 MMOL/L (ref 23–29)
CREAT SERPL-MCNC: 0.8 MG/DL (ref 0.5–1.4)
DIFFERENTIAL METHOD: ABNORMAL
EOSINOPHIL # BLD AUTO: 0.1 K/UL (ref 0–0.5)
EOSINOPHIL NFR BLD: 2.5 % (ref 0–8)
ERYTHROCYTE [DISTWIDTH] IN BLOOD BY AUTOMATED COUNT: 13.9 % (ref 11.5–14.5)
EST. GFR  (AFRICAN AMERICAN): >60 ML/MIN/1.73 M^2
EST. GFR  (NON AFRICAN AMERICAN): >60 ML/MIN/1.73 M^2
GLUCOSE SERPL-MCNC: 82 MG/DL (ref 70–110)
HCT VFR BLD AUTO: 36.6 % (ref 37–48.5)
HDLC SERPL-MCNC: 53 MG/DL (ref 40–75)
HDLC SERPL: 30.3 % (ref 20–50)
HGB BLD-MCNC: 11.6 G/DL (ref 12–16)
IMM GRANULOCYTES # BLD AUTO: 0.01 K/UL (ref 0–0.04)
IMM GRANULOCYTES NFR BLD AUTO: 0.2 % (ref 0–0.5)
LDLC SERPL CALC-MCNC: 112.8 MG/DL (ref 63–159)
LYMPHOCYTES # BLD AUTO: 1.5 K/UL (ref 1–4.8)
LYMPHOCYTES NFR BLD: 33.6 % (ref 18–48)
MCH RBC QN AUTO: 29.7 PG (ref 27–31)
MCHC RBC AUTO-ENTMCNC: 31.7 G/DL (ref 32–36)
MCV RBC AUTO: 94 FL (ref 82–98)
MONOCYTES # BLD AUTO: 0.4 K/UL (ref 0.3–1)
MONOCYTES NFR BLD: 8.7 % (ref 4–15)
NEUTROPHILS # BLD AUTO: 2.4 K/UL (ref 1.8–7.7)
NEUTROPHILS NFR BLD: 54.3 % (ref 38–73)
NONHDLC SERPL-MCNC: 122 MG/DL
NRBC BLD-RTO: 0 /100 WBC
PLATELET # BLD AUTO: 264 K/UL (ref 150–450)
PMV BLD AUTO: 10.8 FL (ref 9.2–12.9)
POTASSIUM SERPL-SCNC: 4.3 MMOL/L (ref 3.5–5.1)
PROT SERPL-MCNC: 7.4 G/DL (ref 6–8.4)
RBC # BLD AUTO: 3.91 M/UL (ref 4–5.4)
SODIUM SERPL-SCNC: 139 MMOL/L (ref 136–145)
TRIGL SERPL-MCNC: 46 MG/DL (ref 30–150)
TSH SERPL DL<=0.005 MIU/L-ACNC: 1.11 UIU/ML (ref 0.4–4)
WBC # BLD AUTO: 4.47 K/UL (ref 3.9–12.7)

## 2021-04-09 PROCEDURE — 99999 PR PBB SHADOW E&M-EST. PATIENT-LVL IV: CPT | Mod: PBBFAC,,, | Performed by: STUDENT IN AN ORGANIZED HEALTH CARE EDUCATION/TRAINING PROGRAM

## 2021-04-09 PROCEDURE — 1126F AMNT PAIN NOTED NONE PRSNT: CPT | Mod: S$GLB,,, | Performed by: STUDENT IN AN ORGANIZED HEALTH CARE EDUCATION/TRAINING PROGRAM

## 2021-04-09 PROCEDURE — 85025 COMPLETE CBC W/AUTO DIFF WBC: CPT | Performed by: STUDENT IN AN ORGANIZED HEALTH CARE EDUCATION/TRAINING PROGRAM

## 2021-04-09 PROCEDURE — 99395 PR PREVENTIVE VISIT,EST,18-39: ICD-10-PCS | Mod: S$GLB,,, | Performed by: STUDENT IN AN ORGANIZED HEALTH CARE EDUCATION/TRAINING PROGRAM

## 2021-04-09 PROCEDURE — 99999 PR PBB SHADOW E&M-EST. PATIENT-LVL IV: ICD-10-PCS | Mod: PBBFAC,,, | Performed by: STUDENT IN AN ORGANIZED HEALTH CARE EDUCATION/TRAINING PROGRAM

## 2021-04-09 PROCEDURE — 80061 LIPID PANEL: CPT | Performed by: STUDENT IN AN ORGANIZED HEALTH CARE EDUCATION/TRAINING PROGRAM

## 2021-04-09 PROCEDURE — 1126F PR PAIN SEVERITY QUANTIFIED, NO PAIN PRESENT: ICD-10-PCS | Mod: S$GLB,,, | Performed by: STUDENT IN AN ORGANIZED HEALTH CARE EDUCATION/TRAINING PROGRAM

## 2021-04-09 PROCEDURE — 99395 PREV VISIT EST AGE 18-39: CPT | Mod: S$GLB,,, | Performed by: STUDENT IN AN ORGANIZED HEALTH CARE EDUCATION/TRAINING PROGRAM

## 2021-04-09 PROCEDURE — 84443 ASSAY THYROID STIM HORMONE: CPT | Performed by: STUDENT IN AN ORGANIZED HEALTH CARE EDUCATION/TRAINING PROGRAM

## 2021-04-09 PROCEDURE — 82306 VITAMIN D 25 HYDROXY: CPT | Performed by: STUDENT IN AN ORGANIZED HEALTH CARE EDUCATION/TRAINING PROGRAM

## 2021-04-09 PROCEDURE — 36415 COLL VENOUS BLD VENIPUNCTURE: CPT | Mod: PO | Performed by: STUDENT IN AN ORGANIZED HEALTH CARE EDUCATION/TRAINING PROGRAM

## 2021-04-09 PROCEDURE — 3008F PR BODY MASS INDEX (BMI) DOCUMENTED: ICD-10-PCS | Mod: CPTII,S$GLB,, | Performed by: STUDENT IN AN ORGANIZED HEALTH CARE EDUCATION/TRAINING PROGRAM

## 2021-04-09 PROCEDURE — 3008F BODY MASS INDEX DOCD: CPT | Mod: CPTII,S$GLB,, | Performed by: STUDENT IN AN ORGANIZED HEALTH CARE EDUCATION/TRAINING PROGRAM

## 2021-04-09 PROCEDURE — 80053 COMPREHEN METABOLIC PANEL: CPT | Performed by: STUDENT IN AN ORGANIZED HEALTH CARE EDUCATION/TRAINING PROGRAM

## 2021-04-09 RX ORDER — SUMATRIPTAN SUCCINATE 25 MG/1
25 TABLET ORAL 2 TIMES DAILY
Qty: 60 TABLET | Refills: 0 | Status: SHIPPED | OUTPATIENT
Start: 2021-04-09 | End: 2022-04-20 | Stop reason: SDUPTHER

## 2021-04-28 ENCOUNTER — PATIENT MESSAGE (OUTPATIENT)
Dept: OBSTETRICS AND GYNECOLOGY | Facility: CLINIC | Age: 41
End: 2021-04-28

## 2021-04-29 ENCOUNTER — TELEPHONE (OUTPATIENT)
Dept: OBSTETRICS AND GYNECOLOGY | Facility: CLINIC | Age: 41
End: 2021-04-29

## 2021-04-29 DIAGNOSIS — N76.0 ACUTE VAGINITIS: Primary | ICD-10-CM

## 2021-04-29 RX ORDER — FLUCONAZOLE 150 MG/1
150 TABLET ORAL ONCE
Qty: 2 TABLET | Refills: 1 | Status: SHIPPED | OUTPATIENT
Start: 2021-04-29 | End: 2021-04-29

## 2022-02-17 ENCOUNTER — TELEPHONE (OUTPATIENT)
Dept: OTOLARYNGOLOGY | Facility: CLINIC | Age: 42
End: 2022-02-17
Payer: COMMERCIAL

## 2022-02-23 ENCOUNTER — PATIENT MESSAGE (OUTPATIENT)
Dept: OTOLARYNGOLOGY | Facility: CLINIC | Age: 42
End: 2022-02-23
Payer: COMMERCIAL

## 2022-04-20 ENCOUNTER — OFFICE VISIT (OUTPATIENT)
Dept: INTERNAL MEDICINE | Facility: CLINIC | Age: 42
End: 2022-04-20
Payer: COMMERCIAL

## 2022-04-20 VITALS
DIASTOLIC BLOOD PRESSURE: 84 MMHG | BODY MASS INDEX: 39.98 KG/M2 | OXYGEN SATURATION: 99 % | HEIGHT: 67 IN | WEIGHT: 254.75 LBS | TEMPERATURE: 99 F | HEART RATE: 63 BPM | SYSTOLIC BLOOD PRESSURE: 136 MMHG

## 2022-04-20 DIAGNOSIS — E66.01 SEVERE OBESITY: ICD-10-CM

## 2022-04-20 DIAGNOSIS — I10 ESSENTIAL HYPERTENSION: ICD-10-CM

## 2022-04-20 DIAGNOSIS — G43.109 MIGRAINE WITH AURA AND WITHOUT STATUS MIGRAINOSUS, NOT INTRACTABLE: ICD-10-CM

## 2022-04-20 DIAGNOSIS — Z12.31 ENCOUNTER FOR SCREENING MAMMOGRAM FOR MALIGNANT NEOPLASM OF BREAST: ICD-10-CM

## 2022-04-20 DIAGNOSIS — D64.9 ANEMIA, UNSPECIFIED TYPE: ICD-10-CM

## 2022-04-20 DIAGNOSIS — G43.919 REFRACTORY MIGRAINE: ICD-10-CM

## 2022-04-20 DIAGNOSIS — E55.9 VITAMIN D DEFICIENCY: ICD-10-CM

## 2022-04-20 DIAGNOSIS — Z00.00 ANNUAL PHYSICAL EXAM: Primary | ICD-10-CM

## 2022-04-20 DIAGNOSIS — E66.01 SEVERE OBESITY (BMI 35.0-39.9) WITH COMORBIDITY: ICD-10-CM

## 2022-04-20 PROBLEM — Z98.890 S/P MYOMECTOMY: Status: RESOLVED | Noted: 2018-03-27 | Resolved: 2022-04-20

## 2022-04-20 PROBLEM — R42 DIZZINESS AND GIDDINESS: Status: RESOLVED | Noted: 2018-05-29 | Resolved: 2022-04-20

## 2022-04-20 PROBLEM — M54.81 CERVICO-OCCIPITAL NEURALGIA: Status: RESOLVED | Noted: 2018-05-29 | Resolved: 2022-04-20

## 2022-04-20 PROBLEM — M54.2 NECK PAIN: Status: RESOLVED | Noted: 2018-05-29 | Resolved: 2022-04-20

## 2022-04-20 PROBLEM — G47.9 SLEEP DISORDER: Status: RESOLVED | Noted: 2018-05-29 | Resolved: 2022-04-20

## 2022-04-20 PROBLEM — E66.9 OBESITY: Status: RESOLVED | Noted: 2018-05-29 | Resolved: 2022-04-20

## 2022-04-20 PROCEDURE — 1159F MED LIST DOCD IN RCRD: CPT | Mod: CPTII,S$GLB,, | Performed by: INTERNAL MEDICINE

## 2022-04-20 PROCEDURE — 3079F PR MOST RECENT DIASTOLIC BLOOD PRESSURE 80-89 MM HG: ICD-10-PCS | Mod: CPTII,S$GLB,, | Performed by: INTERNAL MEDICINE

## 2022-04-20 PROCEDURE — 99395 PR PREVENTIVE VISIT,EST,18-39: ICD-10-PCS | Mod: S$GLB,,, | Performed by: INTERNAL MEDICINE

## 2022-04-20 PROCEDURE — 3008F PR BODY MASS INDEX (BMI) DOCUMENTED: ICD-10-PCS | Mod: CPTII,S$GLB,, | Performed by: INTERNAL MEDICINE

## 2022-04-20 PROCEDURE — 99999 PR PBB SHADOW E&M-EST. PATIENT-LVL III: ICD-10-PCS | Mod: PBBFAC,,, | Performed by: INTERNAL MEDICINE

## 2022-04-20 PROCEDURE — 1159F PR MEDICATION LIST DOCUMENTED IN MEDICAL RECORD: ICD-10-PCS | Mod: CPTII,S$GLB,, | Performed by: INTERNAL MEDICINE

## 2022-04-20 PROCEDURE — 3075F SYST BP GE 130 - 139MM HG: CPT | Mod: CPTII,S$GLB,, | Performed by: INTERNAL MEDICINE

## 2022-04-20 PROCEDURE — 99395 PREV VISIT EST AGE 18-39: CPT | Mod: S$GLB,,, | Performed by: INTERNAL MEDICINE

## 2022-04-20 PROCEDURE — 3075F PR MOST RECENT SYSTOLIC BLOOD PRESS GE 130-139MM HG: ICD-10-PCS | Mod: CPTII,S$GLB,, | Performed by: INTERNAL MEDICINE

## 2022-04-20 PROCEDURE — 3079F DIAST BP 80-89 MM HG: CPT | Mod: CPTII,S$GLB,, | Performed by: INTERNAL MEDICINE

## 2022-04-20 PROCEDURE — 3008F BODY MASS INDEX DOCD: CPT | Mod: CPTII,S$GLB,, | Performed by: INTERNAL MEDICINE

## 2022-04-20 PROCEDURE — 99999 PR PBB SHADOW E&M-EST. PATIENT-LVL III: CPT | Mod: PBBFAC,,, | Performed by: INTERNAL MEDICINE

## 2022-04-20 RX ORDER — SUMATRIPTAN SUCCINATE 25 MG/1
25 TABLET ORAL
Qty: 9 TABLET | Refills: 5 | Status: SHIPPED | OUTPATIENT
Start: 2022-04-20 | End: 2023-10-16 | Stop reason: SDUPTHER

## 2022-04-20 NOTE — PROGRESS NOTES
[facility] Note    Chief Complaint      Chief Complaint   Patient presents with    Establish Care     History of Present Illness      Brianne Familia Blas is a 41 y.o. female who presents today to establish care, annual preventative visit.  Patient comes to appointment alone.  OBGYN: [facility]    Problem List Items Addressed This Visit     Essential hypertension    Current Assessment & Plan     BP controlled on no meds.  Had swelling with Nifedipine, also on Labetalol in past.  Checks BP at work daily, /80. Watches sodium in diet, has been exercising 4 times per week (doing [employer] and strength training).           Refractory migraine    Current Assessment & Plan     Stable on imitrex PRN.  Has not had migraines as much lately.           Vitamin D deficiency    Current Assessment & Plan     Vit D was 15 in 4/2021, taking OTC supplement.           Severe obesity (BMI 35.0-39.9) with comorbidity      Other Visit Diagnoses     Annual physical exam    -  Primary    Relevant Orders    CBC Auto Differential    Lipid Panel    Comprehensive Metabolic Panel    Hemoglobin A1C    Severe obesity        Anemia, unspecified type        Relevant Orders    Iron and TIBC    Ferritin    Encounter for screening mammogram for malignant neoplasm of breast        Relevant Orders    Mammo Digital Screening Bilat w/ [facility]    Migraine with aura and without status migrainosus, not intractable        Relevant Medications    sumatriptan (IMITREX) 25 MG Tab          Health Maintenance   Topic Date Due    Hemoglobin A1c  10/10/2022    TETANUS VACCINE  03/05/2027    Hepatitis C Screening  Completed    Lipid Panel  Completed       Past Medical History:   Diagnosis Date    Fibroids     Hypertension     Migraine     Severe obesity (BMI 35.0-39.9) with comorbidity        Past Surgical History:   Procedure Laterality Date    MYOMECTOMY         family history includes Breast cancer in her maternal  grandmother; Breast cancer (age of onset: 40) in her mother; Cancer in her mother; Hypertension in her father; Iron deficiency in her sister; No Known Problems in her brother.    Social History     Tobacco Use    Smoking status: Never Smoker    Smokeless tobacco: Never Used   Substance Use Topics    Alcohol use: Yes     Comment: Occasionally    Drug use: No       Review of Systems   Constitutional: Negative for chills and fever.   HENT: Negative for sore throat.    Respiratory: Negative for cough and shortness of breath.    Cardiovascular: Negative for chest pain and palpitations.   Gastrointestinal: Negative for constipation, diarrhea, nausea and vomiting.   Genitourinary: Negative for dysuria and hematuria.   Musculoskeletal: Negative for falls.   Neurological: Negative for headaches.        Outpatient Encounter Medications as of 4/20/2022   Medication Sig Dispense Refill    ascorbic acid/collagen hydr (COLLAGEN PLUS VITAMIN C ORAL) Take 1 tablet by mouth once daily.      ergocalciferol, vitamin D2, (VITAMIN D ORAL) Take 1 tablet by mouth once daily.      ibuprofen (ADVIL,MOTRIN) 800 MG tablet Take 800 mg by mouth 3 (three) times daily.      Lactobacillus rhamnosus GG (CULTURELLE) 10 billion cell capsule Take 1 capsule by mouth once daily.      PRENATAL VIT CALC,IRON,FOLIC (PRENATAL VITAMIN ORAL) Take by mouth.      [DISCONTINUED] sumatriptan (IMITREX) 25 MG Tab Take 1 tablet (25 mg total) by mouth 2 (two) times a day. every 2 hours (no more than twice a day) 60 tablet 0    sumatriptan (IMITREX) 25 MG Tab Take 1 tablet (25 mg total) by mouth every 2 (two) hours as needed (migraines, not to exceed 2 doses in 24 hours). every 2 hours (no more than twice a day) 9 tablet 5     No facility-administered encounter medications on file as of 4/20/2022.        Review of patient's allergies indicates:   Allergen Reactions    Nifedipine Edema       Physical Exam      Vital Signs  Temp: 98.8 °F (37.1 °C)  Pulse:  "63  SpO2: 99 %  BP: 136/84  Pain Score: 0-No pain  Height and Weight  Height: 5' 7" (170.2 cm)  Weight: 115.5 kg (254 lb 11.9 oz)  BSA (Calculated - sq m): 2.34 sq meters  BMI (Calculated): 39.9  Weight in (lb) to have BMI = 25: 159.3]    Physical Exam  Constitutional:       Appearance: She is well-developed.   HENT:      Head: Normocephalic and atraumatic.      Right Ear: External ear normal.      Left Ear: External ear normal.   Eyes:      General:         Right eye: No discharge.         Left eye: No discharge.   Cardiovascular:      Rate and Rhythm: Normal rate and regular rhythm.      Heart sounds: Normal heart sounds. No murmur heard.  Pulmonary:      Effort: Pulmonary effort is normal. No respiratory distress.      Breath sounds: Normal breath sounds.   Abdominal:      General: There is no distension.      Palpations: Abdomen is soft.      Tenderness: There is no abdominal tenderness. There is no guarding.   Musculoskeletal:         General: Normal range of motion.      Cervical back: Normal range of motion.   Skin:     General: Skin is warm and dry.   Neurological:      Mental Status: She is alert and oriented to person, place, and time.   Psychiatric:         Behavior: Behavior normal.          Laboratory:  CBC:  No results for input(s): WBC, RBC, HGB, HCT, PLT, MCV, MCH, MCHC in the last 2160 hours.  CMP:  No results for input(s): GLU, CALCIUM, ALBUMIN, PROT, NA, K, CO2, CL, BUN, ALKPHOS, ALT, AST, BILITOT in the last 2160 hours.    Invalid input(s): CREATININ  URINALYSIS:  No results for input(s): COLORU, CLARITYU, SPECGRAV, PHUR, PROTEINUA, GLUCOSEU, BILIRUBINCON, BLOODU, WBCU, RBCU, BACTERIA, MUCUS, NITRITE, LEUKOCYTESUR, UROBILINOGEN, HYALINECASTS in the last 2160 hours.   LIPIDS:  No results for input(s): TSH, HDL, CHOL, TRIG, LDLCALC, CHOLHDL, NONHDLCHOL, TOTALCHOLEST in the last 2160 hours.  TSH:  No results for input(s): TSH in the last 2160 hours.  A1C:  No results for input(s): HGBA1C in the last " 2160 hours.    Radiology:  No results found in the last 30 days.     Assessment/Plan     Brianne Blas is a 41 y.o.female with:    1. Annual physical exam  - CBC Auto Differential; Future  - Lipid Panel; Future  - Comprehensive Metabolic Panel; Future  - Hemoglobin A1C; Future    2. Severe obesity    3. Essential hypertension    4. Refractory migraine    5. Anemia, unspecified type  - Iron and TIBC; Future  - Ferritin; Future    6. Encounter for screening mammogram for malignant neoplasm of breast  - Mammo Digital Screening Bilat w/ [facility]; Future    7. Vitamin D deficiency    8. Migraine with aura and without status migrainosus, not intractable  - sumatriptan (IMITREX) 25 MG Tab; Take 1 tablet (25 mg total) by mouth every 2 (two) hours as needed (migraines, not to exceed 2 doses in 24 hours). every 2 hours (no more than twice a day)  Dispense: 9 tablet; Refill: 5    9. Severe obesity (BMI 35.0-39.9) with comorbidity      -Continue current medications and maintain follow up with specialists.    -Follow up in about 1 year (around 4/20/2023) for Annual Visit.       Vashti López MD  [facility]

## 2022-05-31 ENCOUNTER — PATIENT MESSAGE (OUTPATIENT)
Dept: INTERNAL MEDICINE | Facility: CLINIC | Age: 42
End: 2022-05-31
Payer: COMMERCIAL

## 2022-05-31 DIAGNOSIS — D50.8 IRON DEFICIENCY ANEMIA SECONDARY TO INADEQUATE DIETARY IRON INTAKE: Primary | ICD-10-CM

## 2022-05-31 RX ORDER — FERROUS SULFATE 325(65) MG
325 TABLET, DELAYED RELEASE (ENTERIC COATED) ORAL DAILY
Qty: 30 TABLET | Refills: 0 | Status: SHIPPED | OUTPATIENT
Start: 2022-05-31 | End: 2022-06-03 | Stop reason: SDUPTHER

## 2022-06-01 ENCOUNTER — PATIENT MESSAGE (OUTPATIENT)
Dept: INTERNAL MEDICINE | Facility: CLINIC | Age: 42
End: 2022-06-01
Payer: COMMERCIAL

## 2022-06-01 DIAGNOSIS — D50.8 IRON DEFICIENCY ANEMIA SECONDARY TO INADEQUATE DIETARY IRON INTAKE: ICD-10-CM

## 2022-06-03 ENCOUNTER — PATIENT MESSAGE (OUTPATIENT)
Dept: INTERNAL MEDICINE | Facility: CLINIC | Age: 42
End: 2022-06-03
Payer: COMMERCIAL

## 2022-06-03 DIAGNOSIS — D50.8 IRON DEFICIENCY ANEMIA SECONDARY TO INADEQUATE DIETARY IRON INTAKE: Primary | ICD-10-CM

## 2022-06-03 RX ORDER — FERROUS SULFATE 325(65) MG
325 TABLET, DELAYED RELEASE (ENTERIC COATED) ORAL DAILY
Qty: 30 TABLET | Refills: 3 | Status: SHIPPED | OUTPATIENT
Start: 2022-06-03 | End: 2023-06-22

## 2022-06-03 NOTE — TELEPHONE ENCOUNTER
Three refills placed on iron supplement yes let us repeat iron level in 3 months.  Also add in dark leafy greens to your diet for higher iron content.  We would like your iron level over 20, it is currently at 11.    You had a bilateral breast ultrasound in November 2020 for a possible breast mass and pain, and the mammogram was not a regular screening mammogram it was a diagnostic due to the possible mass and pain in your breast.  The ultrasound results showed no masses and no evidence of malignancy and recommendations were to continue routine screening yearly

## 2022-06-16 ENCOUNTER — PATIENT MESSAGE (OUTPATIENT)
Dept: OBSTETRICS AND GYNECOLOGY | Facility: CLINIC | Age: 42
End: 2022-06-16
Payer: COMMERCIAL

## 2022-08-18 ENCOUNTER — PATIENT MESSAGE (OUTPATIENT)
Dept: INTERNAL MEDICINE | Facility: CLINIC | Age: 42
End: 2022-08-18
Payer: COMMERCIAL

## 2022-08-25 ENCOUNTER — TELEPHONE (OUTPATIENT)
Dept: OBSTETRICS AND GYNECOLOGY | Facility: CLINIC | Age: 42
End: 2022-08-25

## 2022-08-26 NOTE — TELEPHONE ENCOUNTER
----- Message from Fani Kulkarni, [id number]-C sent at 8/25/2022  9:35 AM CDT -----  Hi,     This patient is scheduled on 9/2/2022 I'm out on vacation that day. Can someone call to get her rescheduled?    Thanks,    Fani

## 2022-09-16 ENCOUNTER — PATIENT MESSAGE (OUTPATIENT)
Dept: OBSTETRICS AND GYNECOLOGY | Facility: CLINIC | Age: 42
End: 2022-09-16

## 2022-09-16 ENCOUNTER — LAB VISIT (OUTPATIENT)
Dept: LAB | Facility: HOSPITAL | Age: 42
End: 2022-09-16
Attending: NURSE PRACTITIONER
Payer: COMMERCIAL

## 2022-09-16 ENCOUNTER — OFFICE VISIT (OUTPATIENT)
Dept: OBSTETRICS AND GYNECOLOGY | Facility: CLINIC | Age: 42
End: 2022-09-16
Payer: COMMERCIAL

## 2022-09-16 VITALS
BODY MASS INDEX: 40.5 KG/M2 | SYSTOLIC BLOOD PRESSURE: 171 MMHG | WEIGHT: 258.63 LBS | DIASTOLIC BLOOD PRESSURE: 114 MMHG

## 2022-09-16 DIAGNOSIS — Z12.4 ENCOUNTER FOR PAPANICOLAOU SMEAR FOR CERVICAL CANCER SCREENING: ICD-10-CM

## 2022-09-16 DIAGNOSIS — Z01.419 WELL WOMAN EXAM: Primary | ICD-10-CM

## 2022-09-16 DIAGNOSIS — Z11.3 SCREENING EXAMINATION FOR SEXUALLY TRANSMITTED DISEASE: ICD-10-CM

## 2022-09-16 LAB
HIV 1+2 AB+HIV1 P24 AG SERPL QL IA: NORMAL
RPR SER QL: NORMAL

## 2022-09-16 PROCEDURE — 87591 N.GONORRHOEAE DNA AMP PROB: CPT | Performed by: NURSE PRACTITIONER

## 2022-09-16 PROCEDURE — 3044F PR MOST RECENT HEMOGLOBIN A1C LEVEL <7.0%: ICD-10-PCS | Mod: CPTII,S$GLB,, | Performed by: NURSE PRACTITIONER

## 2022-09-16 PROCEDURE — 3044F HG A1C LEVEL LT 7.0%: CPT | Mod: CPTII,S$GLB,, | Performed by: NURSE PRACTITIONER

## 2022-09-16 PROCEDURE — 88175 CYTOPATH C/V AUTO FLUID REDO: CPT | Performed by: NURSE PRACTITIONER

## 2022-09-16 PROCEDURE — 1160F RVW MEDS BY RX/DR IN RCRD: CPT | Mod: CPTII,S$GLB,, | Performed by: NURSE PRACTITIONER

## 2022-09-16 PROCEDURE — 36415 COLL VENOUS BLD VENIPUNCTURE: CPT | Performed by: NURSE PRACTITIONER

## 2022-09-16 PROCEDURE — 87389 HIV-1 AG W/HIV-1&-2 AB AG IA: CPT | Performed by: NURSE PRACTITIONER

## 2022-09-16 PROCEDURE — 3080F PR MOST RECENT DIASTOLIC BLOOD PRESSURE >= 90 MM HG: ICD-10-PCS | Mod: CPTII,S$GLB,, | Performed by: NURSE PRACTITIONER

## 2022-09-16 PROCEDURE — 87624 HPV HI-RISK TYP POOLED RSLT: CPT | Performed by: NURSE PRACTITIONER

## 2022-09-16 PROCEDURE — 99396 PREV VISIT EST AGE 40-64: CPT | Mod: S$GLB,,, | Performed by: NURSE PRACTITIONER

## 2022-09-16 PROCEDURE — 3008F BODY MASS INDEX DOCD: CPT | Mod: CPTII,S$GLB,, | Performed by: NURSE PRACTITIONER

## 2022-09-16 PROCEDURE — 99999 PR PBB SHADOW E&M-EST. PATIENT-LVL III: CPT | Mod: PBBFAC,,, | Performed by: NURSE PRACTITIONER

## 2022-09-16 PROCEDURE — 1159F PR MEDICATION LIST DOCUMENTED IN MEDICAL RECORD: ICD-10-PCS | Mod: CPTII,S$GLB,, | Performed by: NURSE PRACTITIONER

## 2022-09-16 PROCEDURE — 3008F PR BODY MASS INDEX (BMI) DOCUMENTED: ICD-10-PCS | Mod: CPTII,S$GLB,, | Performed by: NURSE PRACTITIONER

## 2022-09-16 PROCEDURE — 3080F DIAST BP >= 90 MM HG: CPT | Mod: CPTII,S$GLB,, | Performed by: NURSE PRACTITIONER

## 2022-09-16 PROCEDURE — 87491 CHLMYD TRACH DNA AMP PROBE: CPT | Performed by: NURSE PRACTITIONER

## 2022-09-16 PROCEDURE — 86592 SYPHILIS TEST NON-TREP QUAL: CPT | Performed by: NURSE PRACTITIONER

## 2022-09-16 PROCEDURE — 1159F MED LIST DOCD IN RCRD: CPT | Mod: CPTII,S$GLB,, | Performed by: NURSE PRACTITIONER

## 2022-09-16 PROCEDURE — 80074 ACUTE HEPATITIS PANEL: CPT | Performed by: NURSE PRACTITIONER

## 2022-09-16 PROCEDURE — 3077F PR MOST RECENT SYSTOLIC BLOOD PRESSURE >= 140 MM HG: ICD-10-PCS | Mod: CPTII,S$GLB,, | Performed by: NURSE PRACTITIONER

## 2022-09-16 PROCEDURE — 3077F SYST BP >= 140 MM HG: CPT | Mod: CPTII,S$GLB,, | Performed by: NURSE PRACTITIONER

## 2022-09-16 PROCEDURE — 99999 PR PBB SHADOW E&M-EST. PATIENT-LVL III: ICD-10-PCS | Mod: PBBFAC,,, | Performed by: NURSE PRACTITIONER

## 2022-09-16 PROCEDURE — 99396 PR PREVENTIVE VISIT,EST,40-64: ICD-10-PCS | Mod: S$GLB,,, | Performed by: NURSE PRACTITIONER

## 2022-09-16 PROCEDURE — 1160F PR REVIEW ALL MEDS BY PRESCRIBER/CLIN PHARMACIST DOCUMENTED: ICD-10-PCS | Mod: CPTII,S$GLB,, | Performed by: NURSE PRACTITIONER

## 2022-09-16 NOTE — PROGRESS NOTES
CC: Annual    HPI: Pt is a 42 y.o. female who presents for routine annual exam.     Would like to conceive, has been seen at [facility], has had HSG was told has tubal scarring. Would like to discuss next steps.     PAP: 2020= Negative, HPV= Negative, denies history of abnormal PAP, would like PAP today  Mammogram: 2022, Family history of breast cancer in mother pt states mom is BRCA negative    Menstrual cycle: monthly, duration= 5 days, not heavy, denies severe pain  Contraception: trying to conceive   STD screening- would like vaginal and blood testing  Exercise: cardio  Smoking history: no  Wears seatbelts    Denies domestic violence     Past Medical History:   Diagnosis Date    Fibroids     Hypertension     Migraine     Severe obesity (BMI 35.0-39.9) with comorbidity        Past Surgical History:   Procedure Laterality Date    MYOMECTOMY         OB History    Para Term  AB Living   2       2     SAB IAB Ectopic Multiple Live Births   2              # Outcome Date GA Lbr Padilla/2nd Weight Sex Delivery Anes PTL Lv   2 SAB 10/2016           1 2016               Family History   Problem Relation Age of Onset    Breast cancer Mother 40        Breast Cancer x 2    Cancer Mother     Hypertension Father     Iron deficiency Sister     No Known Problems Brother     Breast cancer Maternal Grandmother     Colon cancer Neg Hx     Ovarian cancer Neg Hx        Social History     Tobacco Use    Smoking status: Never    Smokeless tobacco: Never   Substance Use Topics    Alcohol use: Yes     Comment: Occasionally    Drug use: No       BP (!) 171/114   Wt 117.3 kg (258 lb 9.6 oz)   LMP 2022   BMI 40.50 kg/m²       ROS:  GENERAL: Feeling well overall. Denies fever or chills.   SKIN: Denies rash or lesions.   HEAD: Denies head injury or headache.   NODES: Denies enlarged lymph nodes.   CHEST: Denies chest pain or shortness of breath.   CARDIOVASCULAR: Denies palpitations or left  sided chest pain.   ABDOMEN: No abdominal pain, constipation, diarrhea, nausea, vomiting or rectal bleeding.   URINARY: No dysuria, hematuria, or burning on urination.  REPRODUCTIVE: See HPI.   BREASTS: Denies pain, lumps, or nipple discharge.   HEMATOLOGIC: No easy bruisability or excessive bleeding.   MUSCULOSKELETAL: Denies joint pain or swelling.   NEUROLOGIC: Denies syncope or weakness.   PSYCHIATRIC: Denies depression, anxiety or mood swings.    PE:   APPEARANCE: Well nourished, well developed, in no acute distress.  AFFECT: WNL, alert and oriented x 3  SKIN: No acne or hirsutism  NECK: Neck symmetric  NODES: No inguinal, cervical, axillary, or femoral lymph node enlargement  CHEST: Good respiratory effect  ABDOMEN: limited due to body habitus Soft.  No tenderness or masses. Nondistended.  BREASTS: Symmetrical, no skin changes or visible lesions.  No palpable masses, nipple discharge bilaterally.  PELVIC: Normal external genitalia without lesions.  Normal hair distribution.  Adequate perineal body, normal urethral meatus.  Vagina moist and well rugated without lesions or discharge.  Cervix pink, without lesions, discharge or tenderness.  No significant cystocele or rectocele.  Bimanual exam limited due to body habitus shows uterus to be normal size, regular, mobile and nontender.  Adnexa without masses or tenderness.    Anus Perineum:No lesions, no relaxation, no external hemorrhoids.  EXTREMITIES: No edema.      ASSESSMENT AND PLAN  1. Well woman exam        2. Encounter for Papanicolaou smear for cervical cancer screening  HPV High Risk Genotypes, PCR    Liquid-Based Pap Smear, Screening      3. Screening examination for sexually transmitted disease  HIV 1/2 Ag/Ab (4th Gen)    RPR    Hepatitis Panel, Acute    C. trachomatis/N. gonorrhoeae by AMP DNA        Fertility: message sent to schedule discussion with Dr. Hammonds    Patient was counseled today on A.C.S. Pap guidelines and recommendations for yearly  pelvic exams, mammograms and monthly self breast exams; to see her PCP for other health maintenance.     All questions answered, patient verbalizes understanding.     Follow-up with in 1 year for routine exam and PRN.

## 2022-09-17 LAB
HAV IGM SERPL QL IA: NORMAL
HBV CORE IGM SERPL QL IA: NORMAL
HBV SURFACE AG SERPL QL IA: NORMAL
HCV AB SERPL QL IA: NORMAL

## 2022-09-19 LAB
C TRACH DNA SPEC QL NAA+PROBE: NOT DETECTED
N GONORRHOEA DNA SPEC QL NAA+PROBE: NOT DETECTED

## 2022-09-22 LAB
FINAL PATHOLOGIC DIAGNOSIS: NORMAL
Lab: NORMAL

## 2022-09-23 ENCOUNTER — PATIENT MESSAGE (OUTPATIENT)
Dept: OBSTETRICS AND GYNECOLOGY | Facility: CLINIC | Age: 42
End: 2022-09-23
Payer: COMMERCIAL

## 2022-09-23 NOTE — TELEPHONE ENCOUNTER
Problem: Mobility Impaired (Adult and Pediatric)  Goal: *Acute Goals and Plan of Care (Insert Text)  Description  STG:  (1.)Ms. Coleman Cochran will move from supine to sit and sit to supine  with INDEPENDENT within 1-2 treatment day(s). (2.)Ms. Coleman Cochran will transfer from bed to chair and chair to bed with INDEPENDENT using the least restrictive device within 1-2 treatment day(s). (3.)Ms. Coleman Cochran will ambulate with INDEPENDENT for 650 feet with the least restrictive device within 1-2 treatment day(s). LTG:  (1.) Ms. Coleman Cochran will ambulate with INDEPENDENT for 650+ feet with the least restrictive device within three treatment day(s). ________________________________________________________________________________________________     Outcome: Progressing Towards Goal       PHYSICAL THERAPY: Initial Assessment 10/14/2019  INPATIENT:    Payor: Radha Castro / Plan: [facility] / Product Type: PPO /       NAME/AGE/GENDER: Gio Young is a 23 y.o. female   PRIMARY DIAGNOSIS: Morbid obesity ([address] [E66.01]  Morbid obesity ([address] [E66.01]  Morbid obesity ([address] [E66.01] <principal problem not specified>   <principal problem not specified>    Procedure(s) (LRB):  LAPAROSCOPIC SLEEVE GASTRECTOMY (N/A)  Day of Surgery  ICD-10: Treatment Diagnosis:    · Difficulty in walking, Not elsewhere classified (R26.2)  · Other abnormalities of gait and mobility (R26.89)   Precaution/Allergies:  Diflucan [fluconazole]      ASSESSMENT:     Ms. Coleman Cochran presents with decreased independence with functional mobility. Pt performed supine to sit to stand. Pt performed exercises in sitting. Pt ambulated in hallway. Pt supine in bed with needs in reach. This section established at most recent assessment   PROBLEM LIST (Impairments causing functional limitations):  1. Decreased Strength  2. Decreased Transfer Abilities  3. Decreased Ambulation Ability/Technique  4. Decreased Balance  5. Increased Pain  6.  Decreased Activity Unfortunately, that is no a procedure I am trained to perform   Tolerance  7. Decreased Flexibility/Joint Mobility   INTERVENTIONS PLANNED: (Benefits and precautions of physical therapy have been discussed with the patient.)  1. Bed Mobility  2. Gait Training  3. Therapeutic Activites  4. Therapeutic Exercise/Strengthening  5. Transfer Training     TREATMENT PLAN: Frequency/Duration: daily for duration of hospital stay  Rehabilitation Potential For Stated Goals: Good     REHAB RECOMMENDATIONS (at time of discharge pending progress):    Placement: It is my opinion, based on this patient's performance to date, that Ms. Franck Becker may benefit from participating in 1-2 additional therapy sessions in order to continue to assess for rehab potential and then make recommendation for disposition at discharge. Equipment:    None at this time              HISTORY:   History of Present Injury/Illness (Reason for Referral):  Pt is status post above procedure. Past Medical History/Comorbidities:   Ms. Franck Becker  has a past medical history of Asthma and Obesity. Ms. Franck Becker  has a past surgical history that includes hx tonsillectomy and hx other surgical.  Social History/Living Environment:   Home Environment: Apartment  # Steps to Enter: 1  One/Two Story Residence: One story  Living Alone: No  Support Systems: Family member(s)  Patient Expects to be Discharged to[de-identified] Apartment  Current DME Used/Available at Home: None  Prior Level of Function/Work/Activity:  Independent with ambulation.      Number of Personal Factors/Comorbidities that affect the Plan of Care: 0: LOW COMPLEXITY   EXAMINATION:   Most Recent Physical Functioning:   Gross Assessment:  AROM: Generally decreased, functional  Coordination: Generally decreased, functional               Posture:  Posture (WDL): Within defined limits  Balance:  Sitting: Intact  Standing: Intact Bed Mobility:     Wheelchair Mobility:     Transfers:  Sit to Stand: Stand-by assistance  Stand to Sit: Stand-by assistance  Gait:     Distance (ft): 650 Feet (ft)  Ambulation - Level of Assistance: Stand-by assistance      Body Structures Involved:  1. Bones  2. Joints  3. Muscles  4. Ligaments Body Functions Affected:  1. Neuromusculoskeletal  2. Movement Related Activities and Participation Affected:  1. Mobility   Number of elements that affect the Plan of Care: 4+: HIGH COMPLEXITY   CLINICAL PRESENTATION:   Presentation: Stable and uncomplicated: LOW COMPLEXITY   CLINICAL DECISION MAKIN[address] [place] [address] AM-PAC 6 Clicks   Basic Mobility Inpatient Short Form  How much difficulty does the patient currently have. .. Unable A Lot A Little None   1. Turning over in bed (including adjusting bedclothes, sheets and blankets)? ? 1   ? 2   x 3   ? 4   2. Sitting down on and standing up from a chair with arms ( e.g., wheelchair, bedside commode, etc.)   ? 1   ? 2   x 3   ? 4   3. Moving from lying on back to sitting on the side of the bed?   ? 1   ? 2   x 3   ? 4   How much help from another person does the patient currently need. .. Total A Lot A Little None   4. Moving to and from a bed to a chair (including a wheelchair)? ? 1   ? 2   x 3   ? 4   5. Need to walk in hospital room? ? 1   ? 2   x 3   ? 4   6. Climbing 3-5 steps with a railing? ? 1   ? 2   x 3   ? 4   © , Trustees of [address] [place] [address], under license to [employer]. All rights reserved      Score:  Initial: 18 Most Recent: X (Date: -- )    Interpretation of Tool:  Represents activities that are increasingly more difficult (i.e. Bed mobility, Transfers, Gait). Medical Necessity:     · Skilled intervention continues to be required due to decreased mobility ability. Reason for Services/Other Comments:  · Patient continues to require skilled intervention due to decreased mobility ability.    Use of outcome tool(s) and clinical judgement create a POC that gives a: Clear prediction of patient's progress: LOW COMPLEXITY            TREATMENT:   (In addition to Assessment/Re-Assessment sessions the following treatments were rendered)   Pre-treatment Symptoms/Complaints:    Pain: Initial:   Pain Intensity 1: 0  Post Session:  No complaints     Therapeutic Exercise: (  15 minutes):  Exercises per grid below to improve mobility. Date:  9/14 Date:   Date:     Activity/Exercise Parameters Parameters Parameters   Ankle pumps 10     Long arc quads 10     marching 10     Elbow flex/ext 10     Shoulder flex/ext 10                       Braces/Orthotics/Lines/Etc:   · IV  · O2 Device: Room air  Treatment/Session Assessment:    · Response to Treatment:  Pt agreeable to exercise and ambulate with therapy. · Interdisciplinary Collaboration:   o Physical Therapist  o Registered Nurse  · After treatment position/precautions:   o Supine in bed  o Bed/Chair-wheels locked  o Bed in low position  o Call light within reach  o RN notified   · Compliance with Program/Exercises: Compliant most of the time  · Recommendations/Intent for next treatment session: \"Next visit will focus on advancements to more challenging activities and reduction in assistance provided\".   Total Treatment Duration:  PT Patient Time In/Time Out  Time In: 1600  Time Out: [address], PT

## 2022-09-28 ENCOUNTER — PATIENT MESSAGE (OUTPATIENT)
Dept: INTERNAL MEDICINE | Facility: CLINIC | Age: 42
End: 2022-09-28
Payer: COMMERCIAL

## 2022-09-29 ENCOUNTER — PATIENT MESSAGE (OUTPATIENT)
Dept: INTERNAL MEDICINE | Facility: CLINIC | Age: 42
End: 2022-09-29

## 2022-09-29 ENCOUNTER — OFFICE VISIT (OUTPATIENT)
Dept: INTERNAL MEDICINE | Facility: CLINIC | Age: 42
End: 2022-09-29
Payer: COMMERCIAL

## 2022-09-29 VITALS
DIASTOLIC BLOOD PRESSURE: 94 MMHG | OXYGEN SATURATION: 100 % | WEIGHT: 257.25 LBS | BODY MASS INDEX: 40.38 KG/M2 | HEART RATE: 66 BPM | SYSTOLIC BLOOD PRESSURE: 162 MMHG | HEIGHT: 67 IN | TEMPERATURE: 99 F

## 2022-09-29 DIAGNOSIS — I10 ESSENTIAL HYPERTENSION: ICD-10-CM

## 2022-09-29 PROCEDURE — 99214 OFFICE O/P EST MOD 30 MIN: CPT | Mod: S$GLB,,, | Performed by: INTERNAL MEDICINE

## 2022-09-29 PROCEDURE — 1159F PR MEDICATION LIST DOCUMENTED IN MEDICAL RECORD: ICD-10-PCS | Mod: CPTII,S$GLB,, | Performed by: INTERNAL MEDICINE

## 2022-09-29 PROCEDURE — 3044F HG A1C LEVEL LT 7.0%: CPT | Mod: CPTII,S$GLB,, | Performed by: INTERNAL MEDICINE

## 2022-09-29 PROCEDURE — 3080F PR MOST RECENT DIASTOLIC BLOOD PRESSURE >= 90 MM HG: ICD-10-PCS | Mod: CPTII,S$GLB,, | Performed by: INTERNAL MEDICINE

## 2022-09-29 PROCEDURE — 3080F DIAST BP >= 90 MM HG: CPT | Mod: CPTII,S$GLB,, | Performed by: INTERNAL MEDICINE

## 2022-09-29 PROCEDURE — 3008F PR BODY MASS INDEX (BMI) DOCUMENTED: ICD-10-PCS | Mod: CPTII,S$GLB,, | Performed by: INTERNAL MEDICINE

## 2022-09-29 PROCEDURE — 3077F PR MOST RECENT SYSTOLIC BLOOD PRESSURE >= 140 MM HG: ICD-10-PCS | Mod: CPTII,S$GLB,, | Performed by: INTERNAL MEDICINE

## 2022-09-29 PROCEDURE — 3044F PR MOST RECENT HEMOGLOBIN A1C LEVEL <7.0%: ICD-10-PCS | Mod: CPTII,S$GLB,, | Performed by: INTERNAL MEDICINE

## 2022-09-29 PROCEDURE — 1159F MED LIST DOCD IN RCRD: CPT | Mod: CPTII,S$GLB,, | Performed by: INTERNAL MEDICINE

## 2022-09-29 PROCEDURE — 99214 PR OFFICE/OUTPT VISIT, EST, LEVL IV, 30-39 MIN: ICD-10-PCS | Mod: S$GLB,,, | Performed by: INTERNAL MEDICINE

## 2022-09-29 PROCEDURE — 99999 PR PBB SHADOW E&M-EST. PATIENT-LVL III: ICD-10-PCS | Mod: PBBFAC,,, | Performed by: INTERNAL MEDICINE

## 2022-09-29 PROCEDURE — 3008F BODY MASS INDEX DOCD: CPT | Mod: CPTII,S$GLB,, | Performed by: INTERNAL MEDICINE

## 2022-09-29 PROCEDURE — 3077F SYST BP >= 140 MM HG: CPT | Mod: CPTII,S$GLB,, | Performed by: INTERNAL MEDICINE

## 2022-09-29 PROCEDURE — 99999 PR PBB SHADOW E&M-EST. PATIENT-LVL III: CPT | Mod: PBBFAC,,, | Performed by: INTERNAL MEDICINE

## 2022-09-29 RX ORDER — ONDANSETRON HYDROCHLORIDE 8 MG/1
8 TABLET, FILM COATED ORAL 2 TIMES DAILY
Status: ON HOLD | COMMUNITY
End: 2023-11-04 | Stop reason: HOSPADM

## 2022-09-29 RX ORDER — LABETALOL 100 MG/1
100 TABLET, FILM COATED ORAL 2 TIMES DAILY
Qty: 60 TABLET | Refills: 11 | Status: SHIPPED | OUTPATIENT
Start: 2022-09-29 | End: 2022-10-14 | Stop reason: SDUPTHER

## 2022-09-29 NOTE — LETTER
September 29, 2022      [facility]  [address]  [facility] [address]  Phone: [phone]  Fax: [phone]       Patient: Brianne Blas   YOB: 1980  Date of Visit: 09/29/2022    To Whom It May Concern:    Shania Blas  was at [facility] on 09/29/2022. The patient may return to work/school on 9/29/2022 with no restrictions. If you have any questions or concerns, or if I can be of further assistance, please do not hesitate to contact me.    Sincerely,    Vashti López MD

## 2022-09-29 NOTE — PROGRESS NOTES
[facility] Note    Chief Complaint      Chief Complaint   Patient presents with    Hypertension     History of Present Illness      Brianne Familia Blas is a 42 y.o. female who presents today for HTN.  Patient comes to appointment alone.  OBGYN: [facility]    Seeing [facility] Dr. Cm, scheduled for tubal recannalization on 10/28/22    Problem List Items Addressed This Visit       Essential hypertension    Current Assessment & Plan     BP elevated today on no meds.  Had swelling with Nifedipine, also on Labetalol in past.  BP high at endo appt yesterday, 201/107, manual 200/102.  No headache or blurry vision.  Right now, has a headache. Has not been exercising like she was before, diet has not been good.         Relevant Medications    labetaloL (NORMODYNE) 100 MG tablet       Health Maintenance   Topic Date Due    Mammogram  05/30/2023    TETANUS VACCINE  03/05/2027    Hepatitis C Screening  Completed    Lipid Panel  Completed       Past Medical History:   Diagnosis Date    Fibroids     Hypertension     Migraine     Severe obesity (BMI 35.0-39.9) with comorbidity        Past Surgical History:   Procedure Laterality Date    MYOMECTOMY         family history includes Breast cancer in her maternal grandmother; Breast cancer (age of onset: 40) in her mother; Cancer in her mother; Hypertension in her father; Iron deficiency in her sister; No Known Problems in her brother.    Social History     Tobacco Use    Smoking status: Never    Smokeless tobacco: Never   Substance Use Topics    Alcohol use: Yes     Comment: Occasionally    Drug use: No       Review of Systems   Constitutional:  Negative for chills and fever.   HENT:  Negative for sore throat.    Respiratory:  Negative for cough and shortness of breath.    Cardiovascular:  Negative for chest pain and palpitations.   Gastrointestinal:  Negative for constipation, diarrhea, nausea and vomiting.   Genitourinary:  Negative for  "dysuria and hematuria.   Musculoskeletal:  Negative for falls.   Neurological:  Negative for headaches.      Outpatient Encounter Medications as of 9/29/2022   Medication Sig Dispense Refill    ascorbic acid/collagen hydr (COLLAGEN PLUS VITAMIN C ORAL) Take 1 tablet by mouth once daily.      ferrous sulfate 325 (65 FE) MG EC tablet Take 1 tablet (325 mg total) by mouth once daily. 30 tablet 3    ibuprofen (ADVIL,MOTRIN) 800 MG tablet Take 800 mg by mouth 3 (three) times daily.      Lactobacillus rhamnosus GG (CULTURELLE) 10 billion cell capsule Take 1 capsule by mouth once daily.      ondansetron (ZOFRAN) 8 MG tablet Take 8 mg by mouth 2 (two) times daily.      PRENATAL VIT CALC,IRON,FOLIC (PRENATAL VITAMIN ORAL) Take by mouth.      sumatriptan (IMITREX) 25 MG Tab Take 1 tablet (25 mg total) by mouth every 2 (two) hours as needed (migraines, not to exceed 2 doses in 24 hours). every 2 hours (no more than twice a day) 9 tablet 5    labetaloL (NORMODYNE) 100 MG tablet Take 1 tablet (100 mg total) by mouth 2 (two) times daily. 60 tablet 11    [DISCONTINUED] ergocalciferol, vitamin D2, (VITAMIN D ORAL) Take 1 tablet by mouth once daily.       No facility-administered encounter medications on file as of 9/29/2022.        Review of patient's allergies indicates:   Allergen Reactions    Nifedipine Edema       Physical Exam      Vital Signs  Temp: 99.1 °F (37.3 °C)  Pulse: 66  SpO2: 100 %  BP: (!) 162/94  Pain Score:   6  Height and Weight  Height: 5' 7" (170.2 cm)  Weight: 116.7 kg (257 lb 4.4 oz)  BSA (Calculated - sq m): 2.35 sq meters  BMI (Calculated): 40.3  Weight in (lb) to have BMI = 25: 159.3]    Physical Exam  Constitutional:       Appearance: She is well-developed.   HENT:      Head: Normocephalic and atraumatic.      Right Ear: External ear normal.      Left Ear: External ear normal.   Eyes:      General:         Right eye: No discharge.         Left eye: No discharge.   Cardiovascular:      Rate and Rhythm: " Normal rate and regular rhythm.      Heart sounds: Normal heart sounds. No murmur heard.  Pulmonary:      Effort: Pulmonary effort is normal. No respiratory distress.      Breath sounds: Normal breath sounds.   Abdominal:      General: There is no distension.      Palpations: Abdomen is soft.      Tenderness: There is no abdominal tenderness. There is no guarding.   Musculoskeletal:         General: Normal range of motion.      Cervical back: Normal range of motion.   Skin:     General: Skin is warm and dry.   Neurological:      Mental Status: She is alert and oriented to person, place, and time.   Psychiatric:         Behavior: Behavior normal.        Laboratory:  CBC:  No results for input(s): WBC, RBC, HGB, HCT, PLT, MCV, MCH, MCHC in the last 2160 hours.  CMP:  No results for input(s): GLU, CALCIUM, ALBUMIN, PROT, NA, K, CO2, CL, BUN, ALKPHOS, ALT, AST, BILITOT in the last 2160 hours.    Invalid input(s): CREATININ  URINALYSIS:  No results for input(s): COLORU, CLARITYU, SPECGRAV, PHUR, PROTEINUA, GLUCOSEU, BILIRUBINCON, BLOODU, WBCU, RBCU, BACTERIA, MUCUS, NITRITE, LEUKOCYTESUR, UROBILINOGEN, HYALINECASTS in the last 2160 hours.   LIPIDS:  No results for input(s): TSH, HDL, CHOL, TRIG, LDLCALC, CHOLHDL, NONHDLCHOL, TOTALCHOLEST in the last 2160 hours.  TSH:  No results for input(s): TSH in the last 2160 hours.  A1C:  No results for input(s): HGBA1C in the last 2160 hours.    Radiology:  No results found in the last 30 days.     Assessment/Plan     Brianne Familia Blas is a 42 y.o.female with:    1. Essential hypertension  - labetaloL (NORMODYNE) 100 MG tablet; Take 1 tablet (100 mg total) by mouth 2 (two) times daily.  Dispense: 60 tablet; Refill: 11    -trying to conceive so will start labetalol 100 mg BID.  Will send me BP's on Monday  -Continue current medications and maintain follow up with specialists.    -Follow up if symptoms worsen or fail to improve.       Vashti López MD  [facility]

## 2022-09-29 NOTE — ASSESSMENT & PLAN NOTE
BP elevated today on no meds.  Had swelling with Nifedipine, also on Labetalol in past.  BP high at endo appt yesterday, 201/107, manual 200/102.  No headache or blurry vision.  Right now, has a headache. Has not been exercising like she was before, diet has not been good.   PATIENT ON TREATMENT NOTE    February 03, 2020      Patient Name:  BRY MATOS  YOB: 1928                          MRN:  [id number]  DX:  [ICD10] C50.411 Malignant neoplasm of upper-outer quadrant of right female breast IIA    Bry Matos is a 89 year old female with diagnosis 1 - Right ER- PgR- Her2- Malignant neoplasm of upper-outer quadrant of right female breast Infiltrating ductular carcinoma, stage IIA T1a N1a M0   . The patient is undergoing radiation treatment  .      The total dose planning is  Site Total Dose # Fractions   R Drain Sites Boost 1,000 cGy 5   R Mast Scar Boost 1,000 cGy 5   R SCV CW Axilla IM 5,000 cGy 25     The following is the treatment summary so far.    Treatment Summary:  Radiation Oncology - Course: 1 Protocol:    Treatment Site Current Dose Modality From To Elapsed Days Fx.   R SCV CW Axilla IM  2,800 cGy      6X  1/15/2020  2/03/2020  19 14   R Mast Scar Boost   6e       R Drain Sites Boost   6e                 Doing well.  Notes fatigue.  Will start using aquaphor.  Denies any complaints.  Exam with faint pinkish hue to chest wall, no erythema, hyperpigmentation, or desquamation.      Date 2/3/2020   Time 4:06 PM     Weight (kg) (kg) 73.35     Percent Weight Change -4     T (C) 37     R 20     P 75     B/P 150/80     Oxygen Saturation (%) 99   ECOG Performance Status: Record date - 1/27/2020 10:51 AM; ECOG Grade 2 - Ambulatory care of self; Up and about >50% waking hours      Imaging studies were done as ordered using on-board imager and compared with reference images. We will be watching the patient closely. We will proceed as planned.      Authenticated by Salvador Ruff M.D. on 2/03/2020 at 4:19 PM  SALVADOR RUFF M.D.

## 2022-10-03 ENCOUNTER — PATIENT MESSAGE (OUTPATIENT)
Dept: INTERNAL MEDICINE | Facility: CLINIC | Age: 42
End: 2022-10-03
Payer: COMMERCIAL

## 2022-10-12 ENCOUNTER — PATIENT MESSAGE (OUTPATIENT)
Dept: INTERNAL MEDICINE | Facility: CLINIC | Age: 42
End: 2022-10-12
Payer: COMMERCIAL

## 2022-10-12 DIAGNOSIS — I10 ESSENTIAL HYPERTENSION: ICD-10-CM

## 2022-10-14 ENCOUNTER — PATIENT MESSAGE (OUTPATIENT)
Dept: INTERNAL MEDICINE | Facility: CLINIC | Age: 42
End: 2022-10-14
Payer: COMMERCIAL

## 2022-10-14 RX ORDER — LABETALOL 200 MG/1
200 TABLET, FILM COATED ORAL 2 TIMES DAILY
Qty: 180 TABLET | Refills: 3 | Status: SHIPPED | OUTPATIENT
Start: 2022-10-14 | End: 2023-06-22 | Stop reason: SDUPTHER

## 2022-10-21 ENCOUNTER — PATIENT MESSAGE (OUTPATIENT)
Dept: INTERNAL MEDICINE | Facility: CLINIC | Age: 42
End: 2022-10-21
Payer: COMMERCIAL

## 2022-10-24 ENCOUNTER — PATIENT MESSAGE (OUTPATIENT)
Dept: INTERNAL MEDICINE | Facility: CLINIC | Age: 42
End: 2022-10-24
Payer: COMMERCIAL

## 2022-10-24 RX ORDER — HYDRALAZINE HYDROCHLORIDE 10 MG/1
10 TABLET, FILM COATED ORAL EVERY 12 HOURS
Qty: 60 TABLET | Refills: 11 | Status: SHIPPED | OUTPATIENT
Start: 2022-10-24 | End: 2023-06-22 | Stop reason: SDUPTHER

## 2022-11-04 ENCOUNTER — PATIENT MESSAGE (OUTPATIENT)
Dept: INTERNAL MEDICINE | Facility: CLINIC | Age: 42
End: 2022-11-04
Payer: COMMERCIAL

## 2022-11-04 ENCOUNTER — TELEPHONE (OUTPATIENT)
Dept: INTERNAL MEDICINE | Facility: CLINIC | Age: 42
End: 2022-11-04
Payer: COMMERCIAL

## 2022-11-04 NOTE — TELEPHONE ENCOUNTER
Fax number is [phone] for Myrna Cm. Says they will take a letter saying she is cleared and if that doesn't work then they will call us back.

## 2022-11-04 NOTE — TELEPHONE ENCOUNTER
----- Message from Genia Jordan sent at 11/4/2022  9:03 AM CDT -----  Contact: [phone]  Pt called to speak to the nurse I reference to getting clearance for a procedure she is having today. Please Advise

## 2022-11-20 ENCOUNTER — PATIENT MESSAGE (OUTPATIENT)
Dept: INTERNAL MEDICINE | Facility: CLINIC | Age: 42
End: 2022-11-20
Payer: COMMERCIAL

## 2022-11-21 ENCOUNTER — E-VISIT (OUTPATIENT)
Dept: INTERNAL MEDICINE | Facility: CLINIC | Age: 42
End: 2022-11-21
Payer: COMMERCIAL

## 2022-11-21 DIAGNOSIS — N30.00 ACUTE CYSTITIS WITHOUT HEMATURIA: Primary | ICD-10-CM

## 2022-11-21 PROCEDURE — 99421 OL DIG E/M SVC 5-10 MIN: CPT | Mod: S$GLB,,, | Performed by: INTERNAL MEDICINE

## 2022-11-21 PROCEDURE — 99421 PR E&M, ONLINE DIGIT, EST, < 7 DAYS, 5-10 MINS: ICD-10-PCS | Mod: S$GLB,,, | Performed by: INTERNAL MEDICINE

## 2022-11-21 RX ORDER — NITROFURANTOIN 25; 75 MG/1; MG/1
100 CAPSULE ORAL 2 TIMES DAILY
Qty: 10 CAPSULE | Refills: 0 | Status: SHIPPED | OUTPATIENT
Start: 2022-11-21 | End: 2023-01-26

## 2022-11-21 NOTE — PROGRESS NOTES
Patient ID: Brianne Blas is a 42 y.o. female.    Chief Complaint: Urinary Tract Infection    The patient initiated a request through [employer] on 11/21/2022 for evaluation and management with a chief complaint of Urinary Tract Infection     I evaluated the questionnaire submission on 11/21/22.    [facility] Evisit Uti Questionnaire    11/21/2022  7:52 AM CST - Filed by Patient   Do you agree to participate in an E-Visit? Yes   If you have any of the following problems, please present to your local ER or call 911:  I acknowledge   What is the main issue that you would like for your doctor to address today? Urge to urinate.   Are you able to take your vital signs? No   What symptoms do you currently have? Change in urine appearance or smell   When did your symptoms first appear? 11/16/2022   List what you have done or taken to help your symptoms. Drank plenty water, cranberry juice   Please indicate whether you have had the following symptoms during the past 24 hours     Urgent urination (a sudden and uncontrollable urge to urinate) Moderate   Frequent urination of small amounts of urine (going to the toilet very often) Mild   Burning pain when urinating None   Incomplete bladder emptying (still feel like you need to urinate again after urination) Mild   Pain not associated with urination in the lower abdomen below the belly button) Mild   What does your urine look like? Cloudy   Blood seen in the urine (without menstrual cycle) None   Flank pain (pain in one or both sides of the lower back) None   Abnormal Vaginal Discharge (abnormal amount, color and/or odor) None   Discharge from the urethra (urinary opening) without urination None   High body temperature/fever? None-<99.5   Please rate how much discomfort you have experience because of the symptoms in the past 24 hours: Mild   Please indicate how the symptoms have interfered with your every day activities/work in the past 24 hours: Mild   Please indicate  how these symptoms have interfered with your social activities (visiting people, meeting with friends, etc.) in the past 24 hours? None   Are you a diabetic? No   If you are female, please indicate whether you have the following at the time of completion of this questionnaire: Premenstrual syndrome (PMS)   Is there a chance you could be pregnant? No   Provide any information you feel is important to your history not asked above    Please attach any relevant images or files (if you have performed a home test for UTI, please submit a photo of results)         Active Problem List with Overview Notes    Diagnosis Date Noted    Severe obesity (BMI 35.0-39.9) with comorbidity     Refractory migraine 05/29/2018    Vitamin D deficiency 05/29/2018    Essential hypertension 03/27/2018      Recent Labs Obtained:  No visits with results within 7 Day(s) from this visit.   Latest known visit with results is:   Lab Visit on 09/16/2022   Component Date Value Ref Range Status    HIV 1/2 Ag/Ab 09/16/2022 Non-reactive  Non-reactive Final    RPR 09/16/2022 Non-reactive  Non-reactive Final    Hepatitis B Surface Ag 09/16/2022 Non-reactive  Non-reactive Final    Hep B C IgM 09/16/2022 Non-reactive  Non-reactive Final    Hep A IgM 09/16/2022 Non-reactive  Non-reactive Final    Hepatitis C Ab 09/16/2022 Non-reactive  Non-reactive Final       Encounter Diagnosis   Name Primary?    Acute cystitis without hematuria Yes        No orders of the defined types were placed in this encounter.     Medications Ordered This Encounter   Medications    nitrofurantoin, macrocrystal-monohydrate, (MACROBID) 100 MG capsule     Sig: Take 1 capsule (100 mg total) by mouth 2 (two) times daily.     Dispense:  10 capsule     Refill:  0        E-Visit Time Tracking:    Day 1 Time (in minutes): 5     Total Time (in minutes): 5

## 2023-01-26 ENCOUNTER — LAB VISIT (OUTPATIENT)
Dept: LAB | Facility: HOSPITAL | Age: 43
End: 2023-01-26
Attending: INTERNAL MEDICINE
Payer: COMMERCIAL

## 2023-01-26 ENCOUNTER — OFFICE VISIT (OUTPATIENT)
Dept: INTERNAL MEDICINE | Facility: CLINIC | Age: 43
End: 2023-01-26
Payer: COMMERCIAL

## 2023-01-26 VITALS
HEART RATE: 65 BPM | WEIGHT: 261.56 LBS | SYSTOLIC BLOOD PRESSURE: 136 MMHG | OXYGEN SATURATION: 100 % | HEIGHT: 67 IN | BODY MASS INDEX: 41.05 KG/M2 | DIASTOLIC BLOOD PRESSURE: 88 MMHG | TEMPERATURE: 98 F

## 2023-01-26 DIAGNOSIS — I10 ESSENTIAL HYPERTENSION: ICD-10-CM

## 2023-01-26 DIAGNOSIS — D50.8 IRON DEFICIENCY ANEMIA SECONDARY TO INADEQUATE DIETARY IRON INTAKE: ICD-10-CM

## 2023-01-26 DIAGNOSIS — E66.01 SEVERE OBESITY (BMI >= 40): Primary | ICD-10-CM

## 2023-01-26 DIAGNOSIS — G43.919 REFRACTORY MIGRAINE: ICD-10-CM

## 2023-01-26 DIAGNOSIS — Z00.00 ANNUAL PHYSICAL EXAM: ICD-10-CM

## 2023-01-26 LAB
ALBUMIN SERPL BCP-MCNC: 3.7 G/DL (ref 3.5–5.2)
ALP SERPL-CCNC: 52 U/L (ref 55–135)
ALT SERPL W/O P-5'-P-CCNC: 30 U/L (ref 10–44)
ANION GAP SERPL CALC-SCNC: 8 MMOL/L (ref 8–16)
APTT BLDCRRT: 27.5 SEC (ref 21–32)
AST SERPL-CCNC: 25 U/L (ref 10–40)
BASOPHILS # BLD AUTO: 0.04 K/UL (ref 0–0.2)
BASOPHILS NFR BLD: 0.7 % (ref 0–1.9)
BILIRUB SERPL-MCNC: 0.4 MG/DL (ref 0.1–1)
BUN SERPL-MCNC: 12 MG/DL (ref 6–20)
CALCIUM SERPL-MCNC: 9.8 MG/DL (ref 8.7–10.5)
CHLORIDE SERPL-SCNC: 108 MMOL/L (ref 95–110)
CO2 SERPL-SCNC: 23 MMOL/L (ref 23–29)
CREAT SERPL-MCNC: 0.8 MG/DL (ref 0.5–1.4)
DIFFERENTIAL METHOD: ABNORMAL
EOSINOPHIL # BLD AUTO: 0.1 K/UL (ref 0–0.5)
EOSINOPHIL NFR BLD: 2 % (ref 0–8)
ERYTHROCYTE [DISTWIDTH] IN BLOOD BY AUTOMATED COUNT: 15.5 % (ref 11.5–14.5)
EST. GFR  (NO RACE VARIABLE): >60 ML/MIN/1.73 M^2
FERRITIN SERPL-MCNC: 10 NG/ML (ref 20–300)
GLUCOSE SERPL-MCNC: 90 MG/DL (ref 70–110)
HCT VFR BLD AUTO: 33.4 % (ref 37–48.5)
HGB BLD-MCNC: 10.2 G/DL (ref 12–16)
IMM GRANULOCYTES # BLD AUTO: 0.02 K/UL (ref 0–0.04)
IMM GRANULOCYTES NFR BLD AUTO: 0.3 % (ref 0–0.5)
INR PPP: 1.1 (ref 0.8–1.2)
IRON SERPL-MCNC: 35 UG/DL (ref 30–160)
LYMPHOCYTES # BLD AUTO: 1.4 K/UL (ref 1–4.8)
LYMPHOCYTES NFR BLD: 24.4 % (ref 18–48)
MCH RBC QN AUTO: 27.6 PG (ref 27–31)
MCHC RBC AUTO-ENTMCNC: 30.5 G/DL (ref 32–36)
MCV RBC AUTO: 91 FL (ref 82–98)
MONOCYTES # BLD AUTO: 0.6 K/UL (ref 0.3–1)
MONOCYTES NFR BLD: 9.4 % (ref 4–15)
NEUTROPHILS # BLD AUTO: 3.7 K/UL (ref 1.8–7.7)
NEUTROPHILS NFR BLD: 63.2 % (ref 38–73)
NRBC BLD-RTO: 0 /100 WBC
PLATELET # BLD AUTO: 260 K/UL (ref 150–450)
PMV BLD AUTO: 10.8 FL (ref 9.2–12.9)
POTASSIUM SERPL-SCNC: 4.2 MMOL/L (ref 3.5–5.1)
PROT SERPL-MCNC: 7.3 G/DL (ref 6–8.4)
PROTHROMBIN TIME: 11.1 SEC (ref 9–12.5)
RBC # BLD AUTO: 3.69 M/UL (ref 4–5.4)
SATURATED IRON: 8 % (ref 20–50)
SODIUM SERPL-SCNC: 139 MMOL/L (ref 136–145)
TOTAL IRON BINDING CAPACITY: 443 UG/DL (ref 250–450)
TRANSFERRIN SERPL-MCNC: 299 MG/DL (ref 200–375)
WBC # BLD AUTO: 5.86 K/UL (ref 3.9–12.7)

## 2023-01-26 PROCEDURE — 82728 ASSAY OF FERRITIN: CPT | Performed by: INTERNAL MEDICINE

## 2023-01-26 PROCEDURE — 99214 PR OFFICE/OUTPT VISIT, EST, LEVL IV, 30-39 MIN: ICD-10-PCS | Mod: S$GLB,,, | Performed by: INTERNAL MEDICINE

## 2023-01-26 PROCEDURE — 99999 PR PBB SHADOW E&M-EST. PATIENT-LVL III: CPT | Mod: PBBFAC,,, | Performed by: INTERNAL MEDICINE

## 2023-01-26 PROCEDURE — 85610 PROTHROMBIN TIME: CPT | Performed by: INTERNAL MEDICINE

## 2023-01-26 PROCEDURE — 80053 COMPREHEN METABOLIC PANEL: CPT | Performed by: INTERNAL MEDICINE

## 2023-01-26 PROCEDURE — 1159F MED LIST DOCD IN RCRD: CPT | Mod: CPTII,S$GLB,, | Performed by: INTERNAL MEDICINE

## 2023-01-26 PROCEDURE — 99999 PR PBB SHADOW E&M-EST. PATIENT-LVL III: ICD-10-PCS | Mod: PBBFAC,,, | Performed by: INTERNAL MEDICINE

## 2023-01-26 PROCEDURE — 3075F PR MOST RECENT SYSTOLIC BLOOD PRESS GE 130-139MM HG: ICD-10-PCS | Mod: CPTII,S$GLB,, | Performed by: INTERNAL MEDICINE

## 2023-01-26 PROCEDURE — 85730 THROMBOPLASTIN TIME PARTIAL: CPT | Performed by: INTERNAL MEDICINE

## 2023-01-26 PROCEDURE — 3079F PR MOST RECENT DIASTOLIC BLOOD PRESSURE 80-89 MM HG: ICD-10-PCS | Mod: CPTII,S$GLB,, | Performed by: INTERNAL MEDICINE

## 2023-01-26 PROCEDURE — 99214 OFFICE O/P EST MOD 30 MIN: CPT | Mod: S$GLB,,, | Performed by: INTERNAL MEDICINE

## 2023-01-26 PROCEDURE — 3008F PR BODY MASS INDEX (BMI) DOCUMENTED: ICD-10-PCS | Mod: CPTII,S$GLB,, | Performed by: INTERNAL MEDICINE

## 2023-01-26 PROCEDURE — 3079F DIAST BP 80-89 MM HG: CPT | Mod: CPTII,S$GLB,, | Performed by: INTERNAL MEDICINE

## 2023-01-26 PROCEDURE — 84466 ASSAY OF TRANSFERRIN: CPT | Performed by: INTERNAL MEDICINE

## 2023-01-26 PROCEDURE — 36415 COLL VENOUS BLD VENIPUNCTURE: CPT | Performed by: INTERNAL MEDICINE

## 2023-01-26 PROCEDURE — 3075F SYST BP GE 130 - 139MM HG: CPT | Mod: CPTII,S$GLB,, | Performed by: INTERNAL MEDICINE

## 2023-01-26 PROCEDURE — 3008F BODY MASS INDEX DOCD: CPT | Mod: CPTII,S$GLB,, | Performed by: INTERNAL MEDICINE

## 2023-01-26 PROCEDURE — 85025 COMPLETE CBC W/AUTO DIFF WBC: CPT | Performed by: INTERNAL MEDICINE

## 2023-01-26 PROCEDURE — 1159F PR MEDICATION LIST DOCUMENTED IN MEDICAL RECORD: ICD-10-PCS | Mod: CPTII,S$GLB,, | Performed by: INTERNAL MEDICINE

## 2023-01-26 NOTE — PROGRESS NOTES
[facility] Note    Chief Complaint      Chief Complaint   Patient presents with    Follow-up     History of Present Illness      Brianne Familia Blas is a 42 y.o. female who presents today for HTN.  Patient comes to appointment alone.  OBGYN: [facility]    Seeing [facility] Dr. Cm, BP was too high so was unable to do recannalization procedure in 11/2022, planning to reschedule.    Problem List Items Addressed This Visit       Essential hypertension    Current Assessment & Plan     BP controlled on labetalol 200 mg and hydralazine 10 mg. Exericising 4 times per week, weight/pilates/cardio/kathi.  Diet has been good. No CP/SOB.         Refractory migraine    Current Assessment & Plan     Stable on imitrex PRN.  Has only 1 migraine since last visit.         Severe obesity (BMI >= 40) - Primary     Other Visit Diagnoses       Iron deficiency anemia secondary to inadequate dietary iron intake        Relevant Orders    CBC Auto Differential    Comprehensive Metabolic Panel    APTT    PROTIME-INR    Iron and TIBC    Ferritin    Annual physical exam        Relevant Orders    Hemoglobin A1C    CBC Auto Differential    Lipid Panel    Comprehensive Metabolic Panel              Health Maintenance   Topic Date Due    Mammogram  05/30/2023    TETANUS VACCINE  03/05/2027    Hepatitis C Screening  Completed    Lipid Panel  Completed       Past Medical History:   Diagnosis Date    Fibroids     Hypertension     Migraine     Severe obesity (BMI 35.0-39.9) with comorbidity        Past Surgical History:   Procedure Laterality Date    MYOMECTOMY         family history includes Breast cancer in her maternal grandmother; Breast cancer (age of onset: 40) in her mother; Cancer in her mother; Hypertension in her father; Iron deficiency in her sister; No Known Problems in her brother.    Social History     Tobacco Use    Smoking status: Never    Smokeless tobacco: Never   Substance Use Topics     Alcohol use: Yes     Comment: Occasionally    Drug use: No       Review of Systems   Constitutional:  Negative for chills, fever and malaise/fatigue.   HENT:  Negative for sore throat.    Eyes:  Negative for blurred vision.   Respiratory:  Negative for cough and shortness of breath.    Cardiovascular:  Negative for chest pain, palpitations, orthopnea and PND.   Gastrointestinal:  Negative for constipation, diarrhea, nausea and vomiting.   Genitourinary:  Negative for dysuria and hematuria.   Musculoskeletal:  Negative for falls and neck pain.   Neurological:  Negative for headaches.      Outpatient Encounter Medications as of 1/26/2023   Medication Sig Dispense Refill    ascorbic acid/collagen hydr (COLLAGEN PLUS VITAMIN C ORAL) Take 1 tablet by mouth once daily.      hydrALAZINE (APRESOLINE) 10 MG tablet Take 1 tablet (10 mg total) by mouth every 12 (twelve) hours. 60 tablet 11    ibuprofen (ADVIL,MOTRIN) 800 MG tablet Take 800 mg by mouth 3 (three) times daily.      labetaloL (NORMODYNE) 200 MG tablet Take 1 tablet (200 mg total) by mouth 2 (two) times daily. 180 tablet 3    Lactobacillus rhamnosus GG (CULTURELLE) 10 billion cell capsule Take 1 capsule by mouth once daily.      ondansetron (ZOFRAN) 8 MG tablet Take 8 mg by mouth 2 (two) times daily.      PRENATAL VIT CALC,IRON,FOLIC (PRENATAL VITAMIN ORAL) Take by mouth.      sumatriptan (IMITREX) 25 MG Tab Take 1 tablet (25 mg total) by mouth every 2 (two) hours as needed (migraines, not to exceed 2 doses in 24 hours). every 2 hours (no more than twice a day) 9 tablet 5    ferrous sulfate 325 (65 FE) MG EC tablet Take 1 tablet (325 mg total) by mouth once daily. (Patient not taking: Reported on 1/26/2023) 30 tablet 3    [DISCONTINUED] nitrofurantoin, macrocrystal-monohydrate, (MACROBID) 100 MG capsule Take 1 capsule (100 mg total) by mouth 2 (two) times daily. 10 capsule 0     No facility-administered encounter medications on file as of 1/26/2023.        Review  "of patient's allergies indicates:   Allergen Reactions    Nifedipine Edema       Physical Exam      Vital Signs  Temp: 98.4 °F (36.9 °C)  Pulse: 65  SpO2: 100 %  BP: 136/88  Pain Score: 0-No pain  Height and Weight  Height: 5' 7" (170.2 cm)  Weight: 118.6 kg (261 lb 9.2 oz)  BSA (Calculated - sq m): 2.37 sq meters  BMI (Calculated): 41  Weight in (lb) to have BMI = 25: 159.3]    Physical Exam  Constitutional:       Appearance: She is well-developed.   HENT:      Head: Normocephalic and atraumatic.      Right Ear: External ear normal.      Left Ear: External ear normal.   Eyes:      General:         Right eye: No discharge.         Left eye: No discharge.   Cardiovascular:      Rate and Rhythm: Normal rate and regular rhythm.      Heart sounds: Normal heart sounds. No murmur heard.  Pulmonary:      Effort: Pulmonary effort is normal. No respiratory distress.      Breath sounds: Normal breath sounds.   Abdominal:      General: There is no distension.      Palpations: Abdomen is soft.      Tenderness: There is no abdominal tenderness. There is no guarding.   Musculoskeletal:         General: Normal range of motion.      Cervical back: Normal range of motion.   Skin:     General: Skin is warm and dry.   Neurological:      Mental Status: She is alert and oriented to person, place, and time.   Psychiatric:         Behavior: Behavior normal.        Laboratory:  CBC:  No results for input(s): WBC, RBC, HGB, HCT, PLT, MCV, MCH, MCHC in the last 2160 hours.  CMP:  No results for input(s): GLU, CALCIUM, ALBUMIN, PROT, NA, K, CO2, CL, BUN, ALKPHOS, ALT, AST, BILITOT in the last 2160 hours.    Invalid input(s): CREATININ  URINALYSIS:  No results for input(s): COLORU, CLARITYU, SPECGRAV, PHUR, PROTEINUA, GLUCOSEU, BILIRUBINCON, BLOODU, WBCU, RBCU, BACTERIA, MUCUS, NITRITE, LEUKOCYTESUR, UROBILINOGEN, HYALINECASTS in the last 2160 hours.   LIPIDS:  No results for input(s): TSH, HDL, CHOL, TRIG, LDLCALC, CHOLHDL, NONHDLCHOL, " TOTALCHOLEST in the last 2160 hours.  TSH:  No results for input(s): TSH in the last 2160 hours.  A1C:  No results for input(s): HGBA1C in the last 2160 hours.    Radiology:  No results found in the last 30 days.     Assessment/Plan     Brianne Blas is a 42 y.o.female with:    1. Essential hypertension    2. Severe obesity (BMI >= 40)    3. Iron deficiency anemia secondary to inadequate dietary iron intake  - CBC Auto Differential; Future  - Comprehensive Metabolic Panel; Future  - APTT; Future  - PROTIME-INR; Future  - Iron and TIBC; Future  - Ferritin; Future    4. Refractory migraine    5. Annual physical exam  - Hemoglobin A1C; Future  - CBC Auto Differential; Future  - Lipid Panel; Future  - Comprehensive Metabolic Panel; Future      -labs for pre op ordered, low risk (controlled HTN) for moderate risk procedure  -Continue current medications and maintain follow up with specialists.    -Follow up in about 4 months (around 5/26/2023), or if symptoms worsen or fail to improve, for Annual Visit.       Vashti López MD  [facility]                    Answers submitted by the patient for this visit:  High Blood Pressure Questionnaire (Submitted on 1/19/2023)  Chief Complaint: Hypertension  Chronicity: recurrent  Onset: more than 1 month ago  Progression since onset: gradually improving  peripheral edema: No  sweats: No  Agents associated with hypertension: no associated agents  CAD risks: no known risk factors  Compliance problems: no compliance problems  Past treatments: beta blockers, lifestyle changes  Improvement on treatment: significant

## 2023-01-26 NOTE — ASSESSMENT & PLAN NOTE
BP controlled on labetalol 200 mg and hydralazine 10 mg. Exericising 4 times per week, weight/pilates/cardio/kathi.  Diet has been good. No CP/SOB.

## 2023-01-27 ENCOUNTER — PATIENT MESSAGE (OUTPATIENT)
Dept: INTERNAL MEDICINE | Facility: CLINIC | Age: 43
End: 2023-01-27
Payer: COMMERCIAL

## 2023-02-17 ENCOUNTER — PATIENT MESSAGE (OUTPATIENT)
Dept: PRIMARY CARE CLINIC | Facility: CLINIC | Age: 43
End: 2023-02-17
Payer: COMMERCIAL

## 2023-02-21 DIAGNOSIS — I10 ESSENTIAL HYPERTENSION: ICD-10-CM

## 2023-02-21 RX ORDER — LABETALOL 200 MG/1
TABLET, FILM COATED ORAL
Refills: 0 | OUTPATIENT
Start: 2023-02-21

## 2023-02-21 RX ORDER — HYDRALAZINE HYDROCHLORIDE 10 MG/1
TABLET, FILM COATED ORAL
Refills: 0 | OUTPATIENT
Start: 2023-02-21

## 2023-02-22 ENCOUNTER — PATIENT MESSAGE (OUTPATIENT)
Dept: PRIMARY CARE CLINIC | Facility: CLINIC | Age: 43
End: 2023-02-22
Payer: COMMERCIAL

## 2023-02-22 NOTE — TELEPHONE ENCOUNTER
Refill Decision Note   Brianne Blas  is requesting a refill authorization.  Brief Assessment and Rationale for Refill:  Quick Discontinue     Medication Therapy Plan:      Pharmacy is requesting new scripts for the following medications without required information, (sig/ frequency/qty/etc)      Medication Reconciliation Completed: No     Comments: Pharmacies have been requesting medications for patients without required information, (sig, frequency, qty, etc.). In addition, requests are sent for medication(s) pt. are currently not taking, and medications patients have never taken.    We have spoken to the pharmacies about these request types and advised their teams previously that we are unable to assess these New Script requests and require all details for these requests. This is a known issue and has been reported.     Note composed:9:19 PM 02/21/2023

## 2023-05-01 ENCOUNTER — PATIENT MESSAGE (OUTPATIENT)
Dept: PRIMARY CARE CLINIC | Facility: CLINIC | Age: 43
End: 2023-05-01
Payer: COMMERCIAL

## 2023-05-01 DIAGNOSIS — Z12.31 SCREENING MAMMOGRAM, ENCOUNTER FOR: Primary | ICD-10-CM

## 2023-06-22 ENCOUNTER — OFFICE VISIT (OUTPATIENT)
Dept: PRIMARY CARE CLINIC | Facility: CLINIC | Age: 43
End: 2023-06-22
Payer: COMMERCIAL

## 2023-06-22 ENCOUNTER — PATIENT MESSAGE (OUTPATIENT)
Dept: PRIMARY CARE CLINIC | Facility: CLINIC | Age: 43
End: 2023-06-22

## 2023-06-22 ENCOUNTER — PATIENT MESSAGE (OUTPATIENT)
Dept: OBSTETRICS AND GYNECOLOGY | Facility: CLINIC | Age: 43
End: 2023-06-22
Payer: COMMERCIAL

## 2023-06-22 VITALS
HEIGHT: 67 IN | SYSTOLIC BLOOD PRESSURE: 134 MMHG | BODY MASS INDEX: 41.35 KG/M2 | WEIGHT: 263.44 LBS | DIASTOLIC BLOOD PRESSURE: 82 MMHG | OXYGEN SATURATION: 97 % | HEART RATE: 68 BPM

## 2023-06-22 DIAGNOSIS — G43.919 REFRACTORY MIGRAINE: ICD-10-CM

## 2023-06-22 DIAGNOSIS — D50.8 OTHER IRON DEFICIENCY ANEMIA: ICD-10-CM

## 2023-06-22 DIAGNOSIS — Z00.00 ANNUAL PHYSICAL EXAM: Primary | ICD-10-CM

## 2023-06-22 DIAGNOSIS — Z91.89 AT HIGH RISK FOR BREAST CANCER: Primary | ICD-10-CM

## 2023-06-22 DIAGNOSIS — E66.01 SEVERE OBESITY (BMI >= 40): ICD-10-CM

## 2023-06-22 DIAGNOSIS — I10 ESSENTIAL HYPERTENSION: ICD-10-CM

## 2023-06-22 PROBLEM — D50.9 IRON DEFICIENCY ANEMIA: Status: ACTIVE | Noted: 2023-06-22

## 2023-06-22 PROCEDURE — 1159F MED LIST DOCD IN RCRD: CPT | Mod: CPTII,S$GLB,, | Performed by: INTERNAL MEDICINE

## 2023-06-22 PROCEDURE — 1159F PR MEDICATION LIST DOCUMENTED IN MEDICAL RECORD: ICD-10-PCS | Mod: CPTII,S$GLB,, | Performed by: INTERNAL MEDICINE

## 2023-06-22 PROCEDURE — 3044F PR MOST RECENT HEMOGLOBIN A1C LEVEL <7.0%: ICD-10-PCS | Mod: CPTII,S$GLB,, | Performed by: INTERNAL MEDICINE

## 2023-06-22 PROCEDURE — 99999 PR PBB SHADOW E&M-EST. PATIENT-LVL III: ICD-10-PCS | Mod: PBBFAC,,, | Performed by: INTERNAL MEDICINE

## 2023-06-22 PROCEDURE — 3075F SYST BP GE 130 - 139MM HG: CPT | Mod: CPTII,S$GLB,, | Performed by: INTERNAL MEDICINE

## 2023-06-22 PROCEDURE — 3044F HG A1C LEVEL LT 7.0%: CPT | Mod: CPTII,S$GLB,, | Performed by: INTERNAL MEDICINE

## 2023-06-22 PROCEDURE — 99396 PR PREVENTIVE VISIT,EST,40-64: ICD-10-PCS | Mod: S$GLB,,, | Performed by: INTERNAL MEDICINE

## 2023-06-22 PROCEDURE — 3075F PR MOST RECENT SYSTOLIC BLOOD PRESS GE 130-139MM HG: ICD-10-PCS | Mod: CPTII,S$GLB,, | Performed by: INTERNAL MEDICINE

## 2023-06-22 PROCEDURE — 99396 PREV VISIT EST AGE 40-64: CPT | Mod: S$GLB,,, | Performed by: INTERNAL MEDICINE

## 2023-06-22 PROCEDURE — 99999 PR PBB SHADOW E&M-EST. PATIENT-LVL III: CPT | Mod: PBBFAC,,, | Performed by: INTERNAL MEDICINE

## 2023-06-22 PROCEDURE — 3079F PR MOST RECENT DIASTOLIC BLOOD PRESSURE 80-89 MM HG: ICD-10-PCS | Mod: CPTII,S$GLB,, | Performed by: INTERNAL MEDICINE

## 2023-06-22 PROCEDURE — 3008F PR BODY MASS INDEX (BMI) DOCUMENTED: ICD-10-PCS | Mod: CPTII,S$GLB,, | Performed by: INTERNAL MEDICINE

## 2023-06-22 PROCEDURE — 3008F BODY MASS INDEX DOCD: CPT | Mod: CPTII,S$GLB,, | Performed by: INTERNAL MEDICINE

## 2023-06-22 PROCEDURE — 3079F DIAST BP 80-89 MM HG: CPT | Mod: CPTII,S$GLB,, | Performed by: INTERNAL MEDICINE

## 2023-06-22 RX ORDER — HYDRALAZINE HYDROCHLORIDE 10 MG/1
10 TABLET, FILM COATED ORAL EVERY 12 HOURS
Qty: 180 TABLET | Refills: 3 | Status: SHIPPED | OUTPATIENT
Start: 2023-06-22 | End: 2023-08-21

## 2023-06-22 RX ORDER — LABETALOL 200 MG/1
200 TABLET, FILM COATED ORAL 2 TIMES DAILY
Qty: 180 TABLET | Refills: 3 | Status: SHIPPED | OUTPATIENT
Start: 2023-06-22 | End: 2023-08-29 | Stop reason: SDUPTHER

## 2023-06-22 NOTE — PROGRESS NOTES
[facility] Note    Chief Complaint      Chief Complaint   Patient presents with    Annual Exam     History of Present Illness      Brianne Familia Blas is a 43 y.o. female who presents today for Annual preventative visit.  Patient comes to appointment alone.  OBGYN: [facility]    Had recanalization with fertility MD Dr. Cm in 3/2023 which was successful on right side.  Considering Clomid.    Problem List Items Addressed This Visit       Essential hypertension    Current Assessment & Plan     BP controlled on labetalol 200 mg and hydralazine 10 mg. Exericising 3 times per week, weight/pilates/cardio/kathi.  Diet has been good. No CP/SOB.         Relevant Medications    labetaloL (NORMODYNE) 200 MG tablet    Refractory migraine    Current Assessment & Plan     Stable on imitrex PRN.  Has only 1 migraine since last visit.         Severe obesity (BMI >= 40)    Current Assessment & Plan     Weight stable.         Iron deficiency anemia    Current Assessment & Plan     Ferritin 10 on 6/2023 labs, not taking iron at present.          Other Visit Diagnoses       Annual physical exam    -  Primary                Health Maintenance   Topic Date Due    Mammogram  06/21/2024    TETANUS VACCINE  03/05/2027    Hepatitis C Screening  Completed    Lipid Panel  Completed       Past Medical History:   Diagnosis Date    Fibroids     Hypertension     Migraine     Severe obesity (BMI 35.0-39.9) with comorbidity        Past Surgical History:   Procedure Laterality Date    MYOMECTOMY         family history includes Breast cancer in her maternal grandmother; Breast cancer (age of onset: 40) in her mother; Cancer in her mother; Hypertension in her father; Iron deficiency in her sister; No Known Problems in her brother.    Social History     Tobacco Use    Smoking status: Never    Smokeless tobacco: Never   Substance Use Topics    Alcohol use: Yes     Comment: Occasionally    Drug use: No       Review  of Systems   Constitutional:  Negative for chills and fever.   Respiratory:  Negative for cough and shortness of breath.    Cardiovascular:  Negative for chest pain and palpitations.   Gastrointestinal:  Negative for constipation, diarrhea, nausea and vomiting.   Genitourinary:  Negative for dysuria and hematuria.   Musculoskeletal:  Negative for falls.   Neurological:  Negative for headaches.      Outpatient Encounter Medications as of 6/22/2023   Medication Sig Dispense Refill    ibuprofen (ADVIL,MOTRIN) 800 MG tablet Take 800 mg by mouth 3 (three) times daily.      Lactobacillus rhamnosus GG (CULTURELLE) 10 billion cell capsule Take 1 capsule by mouth once daily.      ondansetron (ZOFRAN) 8 MG tablet Take 8 mg by mouth 2 (two) times daily.      PRENATAL VIT CALC,IRON,FOLIC (PRENATAL VITAMIN ORAL) Take by mouth.      sumatriptan (IMITREX) 25 MG Tab Take 1 tablet (25 mg total) by mouth every 2 (two) hours as needed (migraines, not to exceed 2 doses in 24 hours). every 2 hours (no more than twice a day) 9 tablet 5    [DISCONTINUED] hydrALAZINE (APRESOLINE) 10 MG tablet Take 1 tablet (10 mg total) by mouth every 12 (twelve) hours. 60 tablet 11    [DISCONTINUED] labetaloL (NORMODYNE) 200 MG tablet Take 1 tablet (200 mg total) by mouth 2 (two) times daily. 180 tablet 3    hydrALAZINE (APRESOLINE) 10 MG tablet Take 1 tablet (10 mg total) by mouth every 12 (twelve) hours. 180 tablet 3    labetaloL (NORMODYNE) 200 MG tablet Take 1 tablet (200 mg total) by mouth 2 (two) times daily. 180 tablet 3    [DISCONTINUED] ascorbic acid/collagen hydr (COLLAGEN PLUS VITAMIN C ORAL) Take 1 tablet by mouth once daily.      [DISCONTINUED] ferrous sulfate 325 (65 FE) MG EC tablet Take 1 tablet (325 mg total) by mouth once daily. (Patient not taking: Reported on 1/26/2023) 30 tablet 3     No facility-administered encounter medications on file as of 6/22/2023.        Review of patient's allergies indicates:   Allergen Reactions     "Nifedipine Edema       Physical Exam      Vital Signs  Pulse: 68  SpO2: 97 %  BP: 134/82  Pain Score: 0-No pain  Height and Weight  Height: 5' 7" (170.2 cm)  Weight: 119.5 kg (263 lb 7.2 oz)  BSA (Calculated - sq m): 2.38 sq meters  BMI (Calculated): 41.3  Weight in (lb) to have BMI = 25: 159.3]    Physical Exam  Constitutional:       Appearance: She is well-developed.   HENT:      Head: Normocephalic and atraumatic.   Cardiovascular:      Rate and Rhythm: Normal rate and regular rhythm.      Heart sounds: Normal heart sounds. No murmur heard.  Pulmonary:      Effort: Pulmonary effort is normal. No respiratory distress.      Breath sounds: Normal breath sounds.   Abdominal:      General: There is no distension.      Palpations: Abdomen is soft.      Tenderness: There is no abdominal tenderness. There is no guarding.   Skin:     General: Skin is warm and dry.   Neurological:      Mental Status: She is alert. Mental status is at baseline.   Psychiatric:         Behavior: Behavior normal.        Laboratory:  CBC:  Recent Labs   Lab Result Units 06/21/23  0850   WBC K/uL 5.23   RBC M/uL 3.79*   Hemoglobin g/dL 10.0*   Hematocrit % 32.9*   Platelets K/uL 331   MCV fL 87   MCH pg 26.4*   MCHC g/dL 30.4*     CMP:  Recent Labs   Lab Result Units 06/21/23  0850   Glucose mg/dL 91   Calcium mg/dL 9.1   Albumin g/dL 4.1   Total Protein g/dL 7.5   Sodium mmol/L 137   Potassium mmol/L 4.0   CO2 mmol/L 23   Chloride mmol/L 103   BUN mg/dL 10   Alkaline Phosphatase U/L 59   ALT U/L 32   AST U/L 28   Total Bilirubin mg/dL 0.4     URINALYSIS:  No results for input(s): COLORU, CLARITYU, SPECGRAV, PHUR, PROTEINUA, GLUCOSEU, BILIRUBINCON, BLOODU, WBCU, RBCU, BACTERIA, MUCUS, NITRITE, LEUKOCYTESUR, UROBILINOGEN, HYALINECASTS in the last 2160 hours.   LIPIDS:  Recent Labs   Lab Result Units 06/21/23  0850   HDL mg/dL 47   Cholesterol mg/dL 180   Triglycerides mg/dL 56   LDL Cholesterol mg/dL 121.8   HDL/Cholesterol Ratio % 26.1   Non-HDL " Cholesterol mg/dL 133   Total Cholesterol/HDL Ratio  3.8     TSH:  No results for input(s): TSH in the last 2160 hours.  A1C:  Recent Labs   Lab Result Units 06/21/23  0850   Hemoglobin A1C % 4.7       Radiology:  No results found in the last 30 days.     Assessment/Plan     Briannegarry Blas is a 43 y.o.female with:    1. Annual physical exam    2. Essential hypertension  - labetaloL (NORMODYNE) 200 MG tablet; Take 1 tablet (200 mg total) by mouth 2 (two) times daily.  Dispense: 180 tablet; Refill: 3    3. Refractory migraine    4. Severe obesity (BMI >= 40)    5. Other iron deficiency anemia        -restart iron supplement  -Continue current medications and maintain follow up with specialists.    -Follow up in about 1 year (around 6/22/2024) for Annual Visit.       Vashti López MD  [facility]

## 2023-06-22 NOTE — ASSESSMENT & PLAN NOTE
BP controlled on labetalol 200 mg and hydralazine 10 mg. Exericising 3 times per week, weight/pilates/cardio/kathi.  Diet has been good. No CP/SOB.

## 2023-06-29 ENCOUNTER — OFFICE VISIT (OUTPATIENT)
Dept: HEMATOLOGY/ONCOLOGY | Facility: CLINIC | Age: 43
End: 2023-06-29
Payer: COMMERCIAL

## 2023-06-29 DIAGNOSIS — Z91.89 AT HIGH RISK FOR BREAST CANCER: ICD-10-CM

## 2023-06-29 PROCEDURE — 99205 OFFICE O/P NEW HI 60 MIN: CPT | Mod: 95,,, | Performed by: INTERNAL MEDICINE

## 2023-06-29 PROCEDURE — 99205 PR OFFICE/OUTPT VISIT, NEW, LEVL V, 60-74 MIN: ICD-10-PCS | Mod: 95,,, | Performed by: INTERNAL MEDICINE

## 2023-06-29 PROCEDURE — 1159F MED LIST DOCD IN RCRD: CPT | Mod: CPTII,95,, | Performed by: INTERNAL MEDICINE

## 2023-06-29 PROCEDURE — 1160F PR REVIEW ALL MEDS BY PRESCRIBER/CLIN PHARMACIST DOCUMENTED: ICD-10-PCS | Mod: CPTII,95,, | Performed by: INTERNAL MEDICINE

## 2023-06-29 PROCEDURE — 1160F RVW MEDS BY RX/DR IN RCRD: CPT | Mod: CPTII,95,, | Performed by: INTERNAL MEDICINE

## 2023-06-29 PROCEDURE — 1159F PR MEDICATION LIST DOCUMENTED IN MEDICAL RECORD: ICD-10-PCS | Mod: CPTII,95,, | Performed by: INTERNAL MEDICINE

## 2023-06-29 PROCEDURE — 3044F PR MOST RECENT HEMOGLOBIN A1C LEVEL <7.0%: ICD-10-PCS | Mod: CPTII,95,, | Performed by: INTERNAL MEDICINE

## 2023-06-29 PROCEDURE — 3044F HG A1C LEVEL LT 7.0%: CPT | Mod: CPTII,95,, | Performed by: INTERNAL MEDICINE

## 2023-06-29 NOTE — Clinical Note
Hi team, For Mrs. Blas, please help me schedule:  - Genetics referral  - Bilateral Screening Breast MRI in Dec 2023 (pregnancy test and creatinine prior)  - RTC one week following MRI to review results   Thanks!

## 2023-06-29 NOTE — PROGRESS NOTES
High Risk For Breast Cancer Clinic - [facility]- Virtual Visit    Outpatient Medical Oncology Outpatient Note  Patient: Brianne Blas  MRN: [id number]  Primary Care Provider: Vashti López MD  Referring Provider: Vashti López MD  [address]  [address]  [place] [address]     Chief Complaint: Discuss Lifetime Risk of Breast Cancer - Elevated TC Score and Family History of Breast Cancer    Subjective     Subjective:   HPI:   Brianne Blas is a 43 y.o. premenopausal female with PMH significant for:   - Migraines  - Uterine Fibroids (Myomectomy in 2018)  - HTN  - JUDITH presumed from menstruation     She presents for consultation to discuss her lifetime risk of breast cancer as she had her screening mammogram recently which was benign but revealed an elevated Tyrer-Cuzick score. She is here to discuss options for supplemental breast cancer screening imaging and risk reduction strategies.     HPI:  11/2020: self palpated right breast mass  11/24/20: Dx MMG and US: Benign appearing 5 mm oval mass in the left breast at 9 OC 9 cm FN. No lesions on MMG or US in area of a palpable abnormality on the right    6/21/23: Screening MMG: Benign, scattered areas of fibroglandular density. TC: 26.77%    Interval History:    Today, patient feels well. No breast concerns - specifically no breast masses (no longer feels the right breast mass from 11/2020), nipple changes, or pain.    Patient otherwise denies fevers, chills, night sweats, headaches, chest pain, SOB, cough, abdominal pain, N/V, diarrhea, constipation, weight loss, rashes     Review of Systems:  14-point review of systems was asked with the pertinent positives and/or negatives stated in HPI.     High Risk Breast cancer specific history:  - Age: 43 y.o.   - Height:  5' 7''  - Weight: 250 lbs  - Breast density per BI-RADS:  scattered areas of fibroglandular density   - Prior Breast Biopsy: No  - Age at menarche:   15  - Number of pregnancies:    - History of breastfeeding: NA  - Age at menopause, if applicable:  NA. Reports regular menstrual cycle and denies menopausal symptoms (hot flashes, sleep disturbances, depression, vaginal dryness).  - Uterus and ovaries intact: Yes   - HRT: No  - OCPs: no  - Genetic testing: No (had some genetic testing with fertility workup, reportedly normal)   - Personal history of cancer: No   - Previous chest radiation exposure between ages 10-30 years old: No  - Ashkenazi Qu[employer] Inheritance: No  - Race:        Past Medical History:   - Migraines  - Uterine Fibroids (Myomectomy in 2018)  - HTN  - JUDITH presumed from menstruation     Past Medical History:   Diagnosis Date    Fibroids     Hypertension     Migraine     Severe obesity (BMI 35.0-39.9) with comorbidity      Patient Active Problem List   Diagnosis    Essential hypertension    Refractory migraine    Vitamin D deficiency    Severe obesity (BMI >= 40)    Iron deficiency anemia     Past Surgical HIstory:   Past Surgical History:   Procedure Laterality Date    MYOMECTOMY       Family History:   -Mom: Breast Cancer (40s; current age: 70 - Breast cancer recurred but treated definitively) - had genetic testing and told it was wnl   -Maternal Grandmother: Breast Cancer (50s Current Age: 83)   -Maternal Aunts: 5 total - 1 with pancreatic cancer, 1 with colon cancer - no breast cancer in her aunts    Family History   Problem Relation Age of Onset    Breast cancer Mother 40        Breast Cancer x 2    Cancer Mother     Hypertension Father     Iron deficiency Sister     No Known Problems Brother     Breast cancer Maternal Grandmother     Colon cancer Neg Hx     Ovarian cancer Neg Hx        Social History:   Lives: [facility]     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 She has never used smokeless tobacco. She  reports current alcohol use. She reports that she does not use drugs.     Allergies:  Review of patient's allergies indicates:   Allergen Reactions    Nifedipine Edema       Medications:  Current Outpatient Medications   Medication Instructions    hydrALAZINE (APRESOLINE) 10 mg, Oral, Every 12 hours    ibuprofen (ADVIL,MOTRIN) 800 mg, Oral, 3 times daily    labetaloL (NORMODYNE) 200 mg, Oral, 2 times daily    Lactobacillus rhamnosus GG (CULTURELLE) 10 billion cell capsule 1 capsule, Oral, Daily    ondansetron (ZOFRAN) 8 mg, Oral, 2 times daily    PRENATAL VIT CALC,IRON,FOLIC (PRENATAL VITAMIN ORAL) Oral    sumatriptan (IMITREX) 25 mg, Oral, Every 2 hours PRN, every 2 hours (no more than twice a day)          Objective:   Vitals: There were no vitals filed for this visit.  BMI: There is no height or weight on file to calculate BMI.    Physical Exam     General Appearance:    Alert, cooperative, no distress, appears stated age   VIRTUAL VISIT     Laboratory Data:  No visits with results within 1 Week(s) from this visit.   Latest known visit with results is:   Lab Visit on 06/21/2023   Component Date Value Ref Range Status    Hemoglobin A1C 06/21/2023 4.7  4.0 - 5.6 % Final    Comment: ADA Screening Guidelines:  5.7-6.4%  Consistent with prediabetes  >or=6.5%  Consistent with diabetes    High levels of fetal hemoglobin interfere with the HbA1C  assay. Heterozygous hemoglobin variants (HbS, HgC, etc)do  not significantly interfere with this assay.   However, presence of multiple variants may affect accuracy.      Estimated Avg Glucose 06/21/2023 88  68 - 131 mg/dL Final    WBC 06/21/2023 5.23  3.90 - 12.70 K/uL Final    RBC 06/21/2023 3.79 (L)  4.00 - 5.40 M/uL Final    Hemoglobin 06/21/2023 10.0 (L)  12.0 - 16.0 g/dL Final    Hematocrit 06/21/2023 32.9 (L)  37.0 - 48.5 % Final    MCV 06/21/2023 87  82 - 98 fL Final    MCH 06/21/2023 26.4 (L)  27.0 - 31.0 pg Final    MCHC 06/21/2023 30.4 (L)  32.0 - 36.0 g/dL Final     RDW 06/21/2023 15.2 (H)  11.5 - 14.5 % Final    Platelets 06/21/2023 331  150 - 450 K/uL Final    MPV 06/21/2023 9.8  9.2 - 12.9 fL Final    Immature Granulocytes 06/21/2023 0.2  0.0 - 0.5 % Final    Gran # (ANC) 06/21/2023 2.9  1.8 - 7.7 K/uL Final    Immature Grans (Abs) 06/21/2023 0.01  0.00 - 0.04 K/uL Final    Comment: Mild elevation in immature granulocytes is non specific and   can be seen in a variety of conditions including stress response,   acute inflammation, trauma and pregnancy. Correlation with other   laboratory and clinical findings is essential.      Lymph # 06/21/2023 1.6  1.0 - 4.8 K/uL Final    Mono # 06/21/2023 0.5  0.3 - 1.0 K/uL Final    Eos # 06/21/2023 0.1  0.0 - 0.5 K/uL Final    Baso # 06/21/2023 0.04  0.00 - 0.20 K/uL Final    nRBC 06/21/2023 0  0 /100 WBC Final    Gran % 06/21/2023 55.9  38.0 - 73.0 % Final    Lymph % 06/21/2023 31.2  18.0 - 48.0 % Final    Mono % 06/21/2023 9.4  4.0 - 15.0 % Final    Eosinophil % 06/21/2023 2.5  0.0 - 8.0 % Final    Basophil % 06/21/2023 0.8  0.0 - 1.9 % Final    Differential Method 06/21/2023 Automated   Final    Cholesterol 06/21/2023 180  120 - 199 mg/dL Final    Comment: The National Cholesterol Education Program (NCEP) has set the  following guidelines (reference ranges) for Cholesterol:  Optimal.....................<200 mg/dL  Borderline High.............200-239 mg/dL  High........................> or = 240 mg/dL      Triglycerides 06/21/2023 56  30 - 150 mg/dL Final    Comment: The National Cholesterol Education Program (NCEP) has set the  following guidelines (reference values) for triglycerides:  Normal......................<150 mg/dL  Borderline High.............150-199 mg/dL  High........................200-499 mg/dL      HDL 06/21/2023 47  40 - 75 mg/dL Final    Comment: The National Cholesterol Education Program (NCEP) has set the  following guidelines (reference values) for HDL Cholesterol:  Low...............<40  mg/dL  Optimal...........>60 mg/dL      LDL Cholesterol 06/21/2023 121.8  63.0 - 159.0 mg/dL Final    Comment: The National Cholesterol Education Program (NCEP) has set the  following guidelines (reference values) for LDL Cholesterol:  Optimal.......................<130 mg/dL  Borderline High...............130-159 mg/dL  High..........................160-189 mg/dL  Very High.....................>190 mg/dL      HDL/Cholesterol Ratio 06/21/2023 26.1  20.0 - 50.0 % Final    Total Cholesterol/HDL Ratio 06/21/2023 3.8  2.0 - 5.0 Final    Non-HDL Cholesterol 06/21/2023 133  mg/dL Final    Comment: Risk category and Non-HDL cholesterol goals:  Coronary heart disease (CHD)or equivalent (10-year risk of CHD >20%):  Non-HDL cholesterol goal     <130 mg/dL  Two or more CHD risk factors and 10-year risk of CHD <= 20%:  Non-HDL cholesterol goal     <160 mg/dL  0 to 1 CHD risk factor:  Non-HDL cholesterol goal     <190 mg/dL      Sodium 06/21/2023 137  136 - 145 mmol/L Final    Potassium 06/21/2023 4.0  3.5 - 5.1 mmol/L Final    Chloride 06/21/2023 103  95 - 110 mmol/L Final    CO2 06/21/2023 23  23 - 29 mmol/L Final    Glucose 06/21/2023 91  70 - 110 mg/dL Final    BUN 06/21/2023 10  7 - 17 mg/dL Final    Creatinine 06/21/2023 0.77  0.50 - 1.40 mg/dL Final    Calcium 06/21/2023 9.1  8.7 - 10.5 mg/dL Final    Total Protein 06/21/2023 7.5  6.0 - 8.4 g/dL Final    Albumin 06/21/2023 4.1  3.5 - 5.2 g/dL Final    Total Bilirubin 06/21/2023 0.4  0.1 - 1.0 mg/dL Final    Comment: For infants and newborns, interpretation of results should be based  on gestational age, weight and in agreement with clinical  observations.    Premature Infant recommended reference ranges:  Up to 24 hours.............<8.0 mg/dL  Up to 48 hours............<12.0 mg/dL  3-5 days..................<15.0 mg/dL  6-29 days.................<15.0 mg/dL      Alkaline Phosphatase 06/21/2023 59  38 - 126 U/L Final    AST 06/21/2023 28  15 - 46 U/L Final    ALT  2023 32  10 - 44 U/L Final    Anion Gap 2023 11  8 - 16 mmol/L Final    eGFR 2023 >60.0  >60 mL/min/1.73 m^2 Final     Recent Labs   Lab Result Units 23  0850   WBC K/uL 5.23   Hemoglobin g/dL 10.0*   Hematocrit % 32.9*   Platelets K/uL 331     Recent Labs   Lab Result Units 23  0850   Creatinine mg/dL 0.77   AST U/L 28   ALT U/L 32     No results for input(s): IRON, FERRITIN in the last 2160 hours.    Imagin23: Screening MMG: Benign, scattered areas of fibroglandular density. TC: 26.77%    Assessment   Assessment:   Brianne Blas is a 43 y.o. female with PMH of Migraines, Uterine Fibroids (Myomectomy in 2018), HTN, JUDITH presumed from menstruation. She presents for consultation to discuss her lifetime risk of breast cancer given strong family history of breast cancer and elevated TC noted on screening MMG.       * Tyrer-Cuzick (TC) Score: Categories: Average lifetime risk <15%, Intermediate risk 15-19%, and High risk > or = to 20%  Lifetime Risk of Breast Cancer 24.20%     10 year Risk of Breast Cancer  4.20%     *The Elin Model for Breast Cancer Risk:   5-year Breast Cancer Risk 1% (Compared with 0.7% for the average 41 y.o. woman)   Lifetime Breast Cancer Risk 14.9% (Compared with 9.9% for the average 43 y.o. woman)   For the Elin Model, a woman's risk is considered low if her five-year risk of developing breast cancer is less than 1.6%; it is considered high if she scores above 1.66%. (All women who are over 60 have a score of at least 1.66 and are considered high risk, based on the Elin Model.)      - According to the American Cancer Society and NCCN guidelines, patients with a lifetime breast cancer risk of 20% or higher, defined by the Tyrer Cuzick score, may benefit from supplemental screening tests (breast MRI). In addition, endocrine therapy is associated with risk reduction of up to 49% in high risk patients defined as Elin Model 5-year breast cancer risk  of ?1.7% or a 10-year risk by KARLA/Tyrer-Cuzick of ?5% or a history of LCIS (NSABP Breast Cancer Prevention Trial (P1-Study))     Given patient's above high-risk scores, we discussed:  - Imaging: Annual breast MRI in addition to annual mammograms -alternating imaging q 6 months until age 75 or older (Breast MRI in Dec 2023, MMG in June 2024)  - Breast Exam: Breast exam twice yearly (OB/GYN); and self breast awareness   - Chemoprevention: Discussed side effects, risks, and benefits of endocrine therapy. NA at this time based on Elin score (need to recalculate regularly as risk increases with age)  - Genetics: Referral given family history       # Other Co-Morbidities:  - Other Cancer Screening: recommend she update all other cancer screening through her PCP (pap: 2022 wnl), colon cancer (colonoscopy at 47), skin cancer (encouraged mineral based sunscreen and regular skin cancer checks with dermatology)    Follow Up:  - Genetics referral   - Bilateral Screening Breast MRI in Dec 2023 (pregnancy test and creatinine prior)   - RTC one week following MRI to review results   []  UA (RBC: 2016)    1. At high risk for breast cancer      Problem List Items Addressed This Visit    None  Visit Diagnoses       At high risk for breast cancer        Relevant Orders    MRI Breast w/wo Contrast, w/CAD, Bilateral    B-HCG    Creatinine, serum    Ambulatory referral/consult to Genetics            Assessment   _______________________________________________________________________________________________________________________________  DETAILED PATIENT INFORMATION BELOW:   - Discussed with patient that there are several models available for stratifying breast cancer risk, and Tyrer-Cuzick is presently the model utilized by [facility] and is a model recommended per current NCCN guidelines.  The Elin Model for Breast Cancer risk estimates the absolute 5 year risk and lifetime risk of developing breast cancer. Family history  includes only first degree relatives with breast cancer, which is not enough information to estimate the risk of a patient having BRCA mutation. It also underestimates the cancer risk for patients with extensive family history. The Elin Model is a good predictor of risk for populations but not for individuals. It adjusts risk for race/ethnicity. It may underestimate breast cancer risk in patients with atypical hyperplasia and strong family history. The Elin Model was NOT designed to estimate risk for: Women with a prior diagnosis of breast cancer, lobular carcinoma in situ (LCIS), or ductal carcinoma in situ (DCIS); Women who have received previous radiation therapy to the chest for treatment of Hodgkin lymphoma;  Women with gene mutations in BRCA1 or BRCA2, or those who are known to have certain genetic syndromes that increase risk for breast cancer; Women of age <35 or >85. We discussed that there are limitations to every model for risk assessment, particularly that TC can overestimate risk in women with atypical hyperplasia and dense breasts and that Elin underestimates risk for those with a strong family history of breast or ovarian cancers as well as non-white women with atypical hyperplasia which can make them appear to not be candidates for risk reducing therapies.    Screening (If TC 20% or greater)   - Semiannual (every 6 months) CBE (clinical breast exam).   - Annual Breast MRI and Annual MMG - alternating q 6 months until 75 or older     The use of MRI for breast cancer detection is based on the concept of  tumor angiogenesis or neovascularity. Tumor-associated blood vessels have increased permeability, which leads to prompt uptake and release of gadolinium within the first one to two minutes after administration, leading to a pattern of rapid enhancement and washout on MRI.   Bilateral breast examination - Both breasts should be evaluated in an MRI study, for comparison purposes, even when concern about  "possible pathology involves only one breast.  Contrast - Intravenous gadolinium contrast must be used to maximize cancer detection and is administered before breast MRI to highlight the neovascularity associated with cancers. Contrast is not necessary when the study is performed to evaluate silicone implant integrity.  Allergic and anaphylactoid reactions to gadolinium are rare, but can occur. In addition, in patients with renal failure, gadolinium can cause contrast nephropathy and/or nephrogenic systemic fibrosis.   A few studies have also reported gadolinium deposition in the brain from repeated intravenous administration, with the degree of deposition varying based on the specific contrast agent. The clinical significance of this deposition remains unknown, and no data for humans exist to show any adverse effects or harm at this time.   She understands that she may contact her insurance company with regards to coverage of MRI breasts.   She can not undergo an MRI if pregnant.   We discussed that MRI's may have false positives     FDA  link about Gadolinium exposure:  https://www.fda.gov/drugs/drug-safety-and-availability/fda-drug-safety-communication-fda-identifies-no-harmful-effects-date-brain-retention-gadolinium   https://www.fda.gov/Drugs/DrugSafety/yqn795641.htm        TC to determine MRI and Elin to determine chemoprevention   Recommendation for women with elevated TC score:         Recommendation for women with elevated Elin Model.         2. Breast Cancer Risk Reduction Therapy   - For women at high risk for breast cancer (defined below in the MD Spencer Algorithm, see graph labeled "risk categories"), endocrine therapy can reduce the risk of invasive and/or in situ breast cancers.   - Risk-reduction agents (ie, tamoxifen, raloxifene, anastrozole, exemestane) are recommended for individuals ?35 years of age only, as the utility of these agents in those younger than 35 years is unknown. Tamoxifen is the " only agent indicated for premenopausal patients, whereas all 4 agents may be used in those who are postmenopausal  - Trials:   A. KARLA-II: Haydee J, Tomás I, Lynsey JF, Tiara M, Chano J, Casey S, Liat C, Roche N, Berna RE, bety Velasco G, Deirdre B, Leonardo T, Cal A; KARLA-II investigators. Anastrozole for prevention of breast cancer in high-risk postmenopausal women (KARLA-II): an international, double-blind, randomised placebo-controlled trial. Lancet. 2014 Mar 22;383(9611):1041-8. doi: 10.1016/-3946(52)99539-2. Epub 2013 Dec 12. Erratum in: Lancet. 2014 Mar 22;383(7522):1040. Erratum in: Lancet. 2017 Mar 11;389(17427):1010. PMID: 96182174.     B. [facility] Breast Cancer Prevention Trial (P1-Study):  Randomized clinical trial of healthy individuals aged 60 years or older, aged 35 to 59 years with a 1.7% or greater cumulative 5-year risk for developing breast cancer, or with a history of LCIS, were randomized to tamoxifen or placebo. Tamoxifen 20 mg daily for 5 years has shown to reduce risk of breast cancer by 49%. Among individuals with a history of atypical hyperplasia (ADH/ALH), this dose and duration of tamoxifen was associated with an 86% reduction in breast cancer risk.   - Aromatase inhibitors for 5 years have also shown risk reduction. At current, there is not adequate data to recommend longer courses of therapy more than 5 years for risk reduction.   - The results of the P-1 study showed that treatment with tamoxifen decreased the short-term risk for breast cancer by 49% in healthy individuals aged 35 years or older who had an increased risk for the disease.  The absolute risk reduction was 21.4 cases per 1000 over 5 years.100 In terms of numbers needed to treat, this corresponds to treatment of 47 individuals with tamoxifen to prevent 1 case of invasive breast cancer. The reduction in invasive breast cancer risk in participants with AH was particularly striking (RR, 0.14; 95% CI, 0.03-0.47) in  the initial study analysis, and an RR of 0.25 (95% CI, 0.10-0.52) was found after 7 years of follow-up.   - Above have been shown to lower the risk of breast cancer incidence, however there is no survival benefit in patients who don't have breast cancer.   -  For healthy, high-risk, premenopausal individuals, data regarding the risk/benefit ratio for tamoxifen appear relatively  favorable (category 1). For high-risk postmenopausal individuals, data regarding the risk/benefit ratio for tamoxifen are influenced by age, presence of uterus, or comorbid conditions (category 1)  - Reviewed risks of Tamoxifen side effects include hot flashes, invasive endometrial cancer in women > 49 years of age (2.3/1000 compared to 0.9/1000), cataracts, increased risk of VTE including pulmonary embolism among others.          3. Lifestyle modifications which have shown benefit:  - Limit alcohol consumption to less than 1 drink per day (1 ounce liquor, 6 oz wine, 8 oz beer)  - Avoid smoking.  - Exercise at least 150 minutes per week of moderate intensity aerobic activity or at least 75 minutes of vigorous activity. Exercise can lower the relative risk of breast cancer by ~18-20%.  - Maintain healthy weight and avoid post-menopausal weight gain. Avoid processed foods and eat more lean proteins, fruits and vegetables.   - Discussed available resources including genetic counseling, nutrition, weight management.     4. Factors associated with greater breast cancer risk:  Risk factors are categorized into 2 groups: Modifiable and Non-modifiable. Modifiable risk factors include use of hormones, alcohol, smoking, diet and exercise. Non-modifiable risk factors include breast density, genetics, chest radiation, previous pregnancies, age of first period, and age of menopause.   -Increasing age -- The risk of breast cancer increases with older age.  -Female sex  -White race (In the [place], the highest breast cancer risk occurs among White  women, although breast cancer remains the most common cancer among women of every major ethnic/racial group )  -Weight and body fat in postmenopausal women -- Obesity (defined as body mass index [BMI] ?30 kg/m2) is associated with an overall increase  in morbidity and mortality. However, the risk of breast cancer associated with BMI differs by menopausal status.   ?Postmenopausal women - A higher BMI and/or perimenopausal weight gain have been consistently associated with a higher risk of breast cancer among postmenopausal women. The association between a higher BMI and postmenopausal breast cancer risk may be mediated by higher estrogen levels resulting from the peripheral conversion of estrogen precursors (from adipose tissue) to estrogen    ?Inverse relationship in premenopausal women - Unlike postmenopausal women, an increased BMI is associated with a lower risk of breast cancer in premenopausal women, particularly in early adulthood. In a multicenter analysis using pooled individual-level data from approximately 760,000 premenopausal women from 19 prospective cohorts, there was a 4.2-fold increased risk between the lowest and highest BMI categories (BMI <17 versus ?35) at ages 18 to 24 years. The explanation of this finding remains unclear.  -Tall stature -women who were >175 cm (69 inches) tall were 20 percent more likely to develop breast cancer than those <160 cm (63 inches) tall.  -Benign breast disease  -Dense breast tissue -- The density of breast tissue reflects the relative amount of glandular and connective tissue (parenchyma) to adipose tissue. Women with mammographically dense breast tissue, generally defined as dense tissue comprising ?75 percent of the breast, have a four to five times higher breast cancer risk compared with women of similar age with less or no dense tissue. Although breast density is a largely inherited trait, other factors can influence density. For example, lower density has  been associated with higher levels of physical activity  and with a low-fat, high-carbohydrate diet. In postmenopausal women, estrogen and progesterone increase breast density  while the ER antagonist tamoxifen decreases breast density.  Despite the association of exogenous hormones with breast density, breast density is not strongly correlated with endogenous hormone levels. Breast tend to become more fatty with age.   -Bone mineral density -In multiple studies, women with higher bone density have a higher breast cancer risk  -Hormonal factors   -Reproductive factors -Earlier menarche or later menopause, Nulliparity, Increasing age at first full-term pregnancy.   -Personal and family history of breast cancer  -Alcohol use and smoking  -Exposure to therapeutic ionizing radiation        MDM includes:    - Review of prior external notes from unique source and review of diagnostic tests and information  - Independent review and explanation of 3+ results from unique tests   - Discussion of management and ordering 3+ unique tests   - Extensive discussion of treatment and management including consideration of possible diagnoses and management options    - Patient was fully informed of current medical plan. All questions were answered to the patient's satisfaction and patient verbalized understanding of information and agreement with the plan. Advised patient to RTC with any interval changes or concerns.    - Overall, I discussed the diagnosis, history, stage, labs/imaging, prognosis, management, and treatment plan as applicable. I reviewed adverse short and long term effects as applicable.   - Informed patient if symptoms are new or worsening, that it is their responsibility to call the clinic and schedule follow up sooner than stated follow up. Also informed patient if they do not hear from the appointment center in 2-5 business days for their referrals, the patient must call the Oncology clinic so we can follow up on  procedures or referral scheduling.      - Visit time I spent with the patient: 65 minutes of total time spent on the encounter, including counseling patient and/or coordinating care, which includes face to face time and non-face to face time preparing to see the patient (eg, review of tests, chart review), Obtaining and/or reviewing separately obtained history, Documenting clinical information in the electronic or other health record, Independently interpreting results (not separately reported) and communicating results to the patient/family/caregiver, or Care coordination (not separately reported).       Signed   Bridget Randhawa MD  Hematology & Medical Oncology

## 2023-06-30 ENCOUNTER — PATIENT MESSAGE (OUTPATIENT)
Dept: HEMATOLOGY/ONCOLOGY | Facility: CLINIC | Age: 43
End: 2023-06-30
Payer: COMMERCIAL

## 2023-07-06 ENCOUNTER — PATIENT MESSAGE (OUTPATIENT)
Dept: HEMATOLOGY/ONCOLOGY | Facility: CLINIC | Age: 43
End: 2023-07-06
Payer: COMMERCIAL

## 2023-08-21 RX ORDER — HYDRALAZINE HYDROCHLORIDE 10 MG/1
10 TABLET, FILM COATED ORAL EVERY 12 HOURS
Qty: 180 TABLET | Refills: 3 | Status: SHIPPED | OUTPATIENT
Start: 2023-08-21 | End: 2023-08-29 | Stop reason: SDUPTHER

## 2023-08-28 ENCOUNTER — TELEPHONE (OUTPATIENT)
Dept: PRIMARY CARE CLINIC | Facility: CLINIC | Age: 43
End: 2023-08-28
Payer: COMMERCIAL

## 2023-08-28 DIAGNOSIS — I10 ESSENTIAL HYPERTENSION: ICD-10-CM

## 2023-08-28 NOTE — TELEPHONE ENCOUNTER
----- Message from Erika Diaz sent at 8/28/2023  2:08 PM CDT -----  Type:  Needs Medical Advice    Who Called: simeon    Pharmacy name and phone #:  [facility] - [address] [address]  Phone: [phone] Fax: [phone]      Would the patient rather a call back or a response via [facility]? call  Best Call Back Number: [phone] ref:[phone]  Additional Information:  requesting a call back to discuss new prescription for    hydrALAZINE (APRESOLINE) 10 MG tablet can be faxed or verbal  Fax:[phone]

## 2023-08-29 RX ORDER — LABETALOL 200 MG/1
200 TABLET, FILM COATED ORAL 2 TIMES DAILY
Qty: 180 TABLET | Refills: 3 | Status: SHIPPED | OUTPATIENT
Start: 2023-08-29 | End: 2024-08-28

## 2023-08-29 RX ORDER — HYDRALAZINE HYDROCHLORIDE 10 MG/1
10 TABLET, FILM COATED ORAL EVERY 12 HOURS
Qty: 180 TABLET | Refills: 3 | Status: SHIPPED | OUTPATIENT
Start: 2023-08-29 | End: 2024-08-28

## 2023-09-20 ENCOUNTER — TELEPHONE (OUTPATIENT)
Dept: HEMATOLOGY/ONCOLOGY | Facility: CLINIC | Age: 43
End: 2023-09-20
Payer: COMMERCIAL

## 2023-10-16 DIAGNOSIS — G43.109 MIGRAINE WITH AURA AND WITHOUT STATUS MIGRAINOSUS, NOT INTRACTABLE: ICD-10-CM

## 2023-10-17 RX ORDER — SUMATRIPTAN SUCCINATE 25 MG/1
25 TABLET ORAL
Qty: 9 TABLET | Refills: 5 | Status: SHIPPED | OUTPATIENT
Start: 2023-10-17 | End: 2023-11-16

## 2023-10-17 NOTE — TELEPHONE ENCOUNTER
No care due was identified.  [facility] Embedded Care Due Messages. Reference number: [id number].   10/16/2023 8:39:42 PM CDT

## 2023-10-20 ENCOUNTER — PATIENT MESSAGE (OUTPATIENT)
Dept: PRIMARY CARE CLINIC | Facility: CLINIC | Age: 43
End: 2023-10-20
Payer: COMMERCIAL

## 2023-10-30 ENCOUNTER — TELEPHONE (OUTPATIENT)
Dept: OBSTETRICS AND GYNECOLOGY | Facility: CLINIC | Age: 43
End: 2023-10-30
Payer: COMMERCIAL

## 2023-10-30 NOTE — TELEPHONE ENCOUNTER
----- Message from Aislinn Isaac sent at 10/30/2023  4:14 PM CDT -----  Type:  Patient Returning Call    Who Called:pt  Who Left Message for Patient: Leonid Vu   Does the patient know what this is regarding?:returning call  Would the patient rather a call back or a response via [facility]? call  Best Call Back Number:[phone]  Additional Information:

## 2023-10-30 NOTE — TELEPHONE ENCOUNTER
----- Message from Tucker Seth sent at 10/24/2023 11:36 AM CDT -----  Contact: pt mom  Type:  Sooner Apoointment Request    Caller is requesting a sooner appointment.  Caller declined first available appointment listed below.  Caller will not accept being placed on the waitlist and is requesting a message be sent to doctor.  Name of Caller:PT MOM   When is the first available appointment? N/A  Symptoms:6 WEEKS OB  Would the patient rather a call back or a response via [facility]? Call   Best Call Back Number:[phone]  Additional Information:

## 2023-10-30 NOTE — TELEPHONE ENCOUNTER
Returned call to the patient. No answer, left voicemail message for the patient to call the clinic back.      Can you see this patient for her new pregnancy?

## 2023-11-02 ENCOUNTER — TELEPHONE (OUTPATIENT)
Dept: OBSTETRICS AND GYNECOLOGY | Facility: CLINIC | Age: 43
End: 2023-11-02
Payer: COMMERCIAL

## 2023-11-03 ENCOUNTER — HOSPITAL ENCOUNTER (OUTPATIENT)
Facility: HOSPITAL | Age: 43
Discharge: HOME | End: 2023-11-04
Attending: EMERGENCY MEDICINE | Admitting: EMERGENCY MEDICINE
Payer: COMMERCIAL

## 2023-11-03 ENCOUNTER — HOSPITAL ENCOUNTER (EMERGENCY)
Facility: HOSPITAL | Age: 43
Discharge: ANOTHER HOSPITAL | End: 2023-11-03
Attending: EMERGENCY MEDICINE
Payer: COMMERCIAL

## 2023-11-03 VITALS
TEMPERATURE: 99 F | SYSTOLIC BLOOD PRESSURE: 190 MMHG | HEART RATE: 83 BPM | OXYGEN SATURATION: 98 % | BODY MASS INDEX: 42.38 KG/M2 | HEIGHT: 67 IN | DIASTOLIC BLOOD PRESSURE: 99 MMHG | RESPIRATION RATE: 16 BRPM | WEIGHT: 270 LBS

## 2023-11-03 DIAGNOSIS — O26.899 ABDOMINAL PAIN AFFECTING PREGNANCY: Primary | ICD-10-CM

## 2023-11-03 DIAGNOSIS — D21.9 FIBROID: ICD-10-CM

## 2023-11-03 DIAGNOSIS — R10.9 ABDOMINAL PAIN AFFECTING PREGNANCY: Primary | ICD-10-CM

## 2023-11-03 DIAGNOSIS — O00.90 ECTOPIC PREGNANCY WITHOUT INTRAUTERINE PREGNANCY, UNSPECIFIED LOCATION: ICD-10-CM

## 2023-11-03 DIAGNOSIS — O36.80X0 PREGNANCY OF UNKNOWN ANATOMIC LOCATION: Primary | ICD-10-CM

## 2023-11-03 DIAGNOSIS — R10.2 PELVIC PAIN: ICD-10-CM

## 2023-11-03 LAB
ABO + RH BLD: NORMAL
ALBUMIN SERPL BCP-MCNC: 3.5 G/DL (ref 3.5–5.2)
ALP SERPL-CCNC: 44 U/L (ref 55–135)
ALT SERPL W/O P-5'-P-CCNC: 25 U/L (ref 10–44)
ANION GAP SERPL CALC-SCNC: 9 MMOL/L (ref 8–16)
AST SERPL-CCNC: 23 U/L (ref 10–40)
B-HCG UR QL: POSITIVE
BACTERIA GENITAL QL WET PREP: ABNORMAL
BASOPHILS # BLD AUTO: 0.05 K/UL (ref 0–0.2)
BASOPHILS NFR BLD: 0.6 % (ref 0–1.9)
BILIRUB SERPL-MCNC: 0.4 MG/DL (ref 0.1–1)
BILIRUB UR QL STRIP: NEGATIVE
BUN SERPL-MCNC: 9 MG/DL (ref 6–20)
C TRACH DNA SPEC QL NAA+PROBE: NOT DETECTED
CALCIUM SERPL-MCNC: 9 MG/DL (ref 8.7–10.5)
CHLORIDE SERPL-SCNC: 111 MMOL/L (ref 95–110)
CLARITY UR REFRACT.AUTO: CLEAR
CLUE CELLS VAG QL WET PREP: ABNORMAL
CO2 SERPL-SCNC: 17 MMOL/L (ref 23–29)
COLOR UR AUTO: COLORLESS
CREAT SERPL-MCNC: 0.7 MG/DL (ref 0.5–1.4)
CTP QC/QA: YES
DIFFERENTIAL METHOD: ABNORMAL
EOSINOPHIL # BLD AUTO: 0.1 K/UL (ref 0–0.5)
EOSINOPHIL NFR BLD: 1.3 % (ref 0–8)
ERYTHROCYTE [DISTWIDTH] IN BLOOD BY AUTOMATED COUNT: 16.6 % (ref 11.5–14.5)
EST. GFR  (NO RACE VARIABLE): >60 ML/MIN/1.73 M^2
FILAMENT FUNGI VAG WET PREP-#/AREA: ABNORMAL
GLUCOSE SERPL-MCNC: 80 MG/DL (ref 70–110)
GLUCOSE UR QL STRIP: NEGATIVE
HCG INTACT+B SERPL-ACNC: 1415 MIU/ML
HCT VFR BLD AUTO: 32.5 % (ref 37–48.5)
HCV AB SERPL QL IA: NORMAL
HGB BLD-MCNC: 10.5 G/DL (ref 12–16)
HGB UR QL STRIP: NEGATIVE
HIV 1+2 AB+HIV1 P24 AG SERPL QL IA: NORMAL
IMM GRANULOCYTES # BLD AUTO: 0.02 K/UL (ref 0–0.04)
IMM GRANULOCYTES NFR BLD AUTO: 0.2 % (ref 0–0.5)
KETONES UR QL STRIP: NEGATIVE
LEUKOCYTE ESTERASE UR QL STRIP: NEGATIVE
LYMPHOCYTES # BLD AUTO: 1.8 K/UL (ref 1–4.8)
LYMPHOCYTES NFR BLD: 21.7 % (ref 18–48)
MCH RBC QN AUTO: 29.3 PG (ref 27–31)
MCHC RBC AUTO-ENTMCNC: 32.3 G/DL (ref 32–36)
MCV RBC AUTO: 91 FL (ref 82–98)
MONOCYTES # BLD AUTO: 0.6 K/UL (ref 0.3–1)
MONOCYTES NFR BLD: 7.9 % (ref 4–15)
N GONORRHOEA DNA SPEC QL NAA+PROBE: NOT DETECTED
NEUTROPHILS # BLD AUTO: 5.6 K/UL (ref 1.8–7.7)
NEUTROPHILS NFR BLD: 68.3 % (ref 38–73)
NITRITE UR QL STRIP: NEGATIVE
NRBC BLD-RTO: 0 /100 WBC
PH UR STRIP: 6 [PH] (ref 5–8)
PLATELET # BLD AUTO: 262 K/UL (ref 150–450)
PMV BLD AUTO: 10.4 FL (ref 9.2–12.9)
POTASSIUM SERPL-SCNC: 3.9 MMOL/L (ref 3.5–5.1)
PROT SERPL-MCNC: 7.2 G/DL (ref 6–8.4)
PROT UR QL STRIP: NEGATIVE
RBC # BLD AUTO: 3.58 M/UL (ref 4–5.4)
SODIUM SERPL-SCNC: 137 MMOL/L (ref 136–145)
SP GR UR STRIP: 1 (ref 1–1.03)
SPECIMEN SOURCE: ABNORMAL
T VAGINALIS GENITAL QL WET PREP: ABNORMAL
URN SPEC COLLECT METH UR: ABNORMAL
WBC # BLD AUTO: 8.15 K/UL (ref 3.9–12.7)
WBC #/AREA VAG WET PREP: ABNORMAL
YEAST GENITAL QL WET PREP: ABNORMAL

## 2023-11-03 PROCEDURE — 99223 PR INITIAL HOSPITAL CARE,LEVL III: ICD-10-PCS | Mod: ,,, | Performed by: OBSTETRICS & GYNECOLOGY

## 2023-11-03 PROCEDURE — 87210 SMEAR WET MOUNT SALINE/INK: CPT | Performed by: EMERGENCY MEDICINE

## 2023-11-03 PROCEDURE — 96360 HYDRATION IV INFUSION INIT: CPT

## 2023-11-03 PROCEDURE — 96361 HYDRATE IV INFUSION ADD-ON: CPT

## 2023-11-03 PROCEDURE — 84702 CHORIONIC GONADOTROPIN TEST: CPT | Performed by: EMERGENCY MEDICINE

## 2023-11-03 PROCEDURE — 80053 COMPREHEN METABOLIC PANEL: CPT | Performed by: EMERGENCY MEDICINE

## 2023-11-03 PROCEDURE — 99223 1ST HOSP IP/OBS HIGH 75: CPT | Mod: ,,, | Performed by: OBSTETRICS & GYNECOLOGY

## 2023-11-03 PROCEDURE — 25000003 PHARM REV CODE 250: Performed by: EMERGENCY MEDICINE

## 2023-11-03 PROCEDURE — 86803 HEPATITIS C AB TEST: CPT | Performed by: PHYSICIAN ASSISTANT

## 2023-11-03 PROCEDURE — 81025 URINE PREGNANCY TEST: CPT | Performed by: EMERGENCY MEDICINE

## 2023-11-03 PROCEDURE — 87491 CHLMYD TRACH DNA AMP PROBE: CPT | Performed by: EMERGENCY MEDICINE

## 2023-11-03 PROCEDURE — 87389 HIV-1 AG W/HIV-1&-2 AB AG IA: CPT | Performed by: PHYSICIAN ASSISTANT

## 2023-11-03 PROCEDURE — G0378 HOSPITAL OBSERVATION PER HR: HCPCS

## 2023-11-03 PROCEDURE — 86901 BLOOD TYPING SEROLOGIC RH(D): CPT | Performed by: EMERGENCY MEDICINE

## 2023-11-03 PROCEDURE — 85025 COMPLETE CBC W/AUTO DIFF WBC: CPT | Performed by: EMERGENCY MEDICINE

## 2023-11-03 PROCEDURE — 81003 URINALYSIS AUTO W/O SCOPE: CPT | Performed by: EMERGENCY MEDICINE

## 2023-11-03 PROCEDURE — 25000003 PHARM REV CODE 250: Performed by: OBSTETRICS & GYNECOLOGY

## 2023-11-03 PROCEDURE — 99285 EMERGENCY DEPT VISIT HI MDM: CPT | Mod: 25,27

## 2023-11-03 PROCEDURE — 99285 EMERGENCY DEPT VISIT HI MDM: CPT | Mod: 25

## 2023-11-03 RX ORDER — DIPHENHYDRAMINE HYDROCHLORIDE 50 MG/ML
25 INJECTION INTRAMUSCULAR; INTRAVENOUS EVERY 4 HOURS PRN
Status: DISCONTINUED | OUTPATIENT
Start: 2023-11-03 | End: 2023-11-04 | Stop reason: HOSPADM

## 2023-11-03 RX ORDER — ONDANSETRON 8 MG/1
8 TABLET, ORALLY DISINTEGRATING ORAL EVERY 8 HOURS PRN
Status: DISCONTINUED | OUTPATIENT
Start: 2023-11-03 | End: 2023-11-04 | Stop reason: HOSPADM

## 2023-11-03 RX ORDER — LABETALOL 100 MG/1
200 TABLET, FILM COATED ORAL EVERY 12 HOURS
Status: DISCONTINUED | OUTPATIENT
Start: 2023-11-03 | End: 2023-11-04 | Stop reason: HOSPADM

## 2023-11-03 RX ORDER — ACETAMINOPHEN 500 MG
1000 TABLET ORAL
Status: COMPLETED | OUTPATIENT
Start: 2023-11-03 | End: 2023-11-03

## 2023-11-03 RX ORDER — PROCHLORPERAZINE EDISYLATE 5 MG/ML
5 INJECTION INTRAMUSCULAR; INTRAVENOUS EVERY 6 HOURS PRN
Status: DISCONTINUED | OUTPATIENT
Start: 2023-11-03 | End: 2023-11-04 | Stop reason: HOSPADM

## 2023-11-03 RX ORDER — AMOXICILLIN 250 MG
1 CAPSULE ORAL NIGHTLY PRN
Status: DISCONTINUED | OUTPATIENT
Start: 2023-11-03 | End: 2023-11-04 | Stop reason: HOSPADM

## 2023-11-03 RX ORDER — PRENATAL WITH FERROUS FUM AND FOLIC ACID 3080; 920; 120; 400; 22; 1.84; 3; 20; 10; 1; 12; 200; 27; 25; 2 [IU]/1; [IU]/1; MG/1; [IU]/1; MG/1; MG/1; MG/1; MG/1; MG/1; MG/1; UG/1; MG/1; MG/1; MG/1; MG/1
1 TABLET ORAL DAILY
Status: DISCONTINUED | OUTPATIENT
Start: 2023-11-04 | End: 2023-11-04 | Stop reason: HOSPADM

## 2023-11-03 RX ORDER — HYDRALAZINE HYDROCHLORIDE 10 MG/1
10 TABLET, FILM COATED ORAL EVERY 12 HOURS
Status: DISCONTINUED | OUTPATIENT
Start: 2023-11-03 | End: 2023-11-04 | Stop reason: HOSPADM

## 2023-11-03 RX ORDER — CLOMIPHENE CITRATE 50 MG/1
50 TABLET ORAL DAILY
Status: ON HOLD | COMMUNITY
Start: 2023-09-16 | End: 2023-11-04 | Stop reason: HOSPADM

## 2023-11-03 RX ORDER — DIPHENHYDRAMINE HCL 25 MG
25 CAPSULE ORAL EVERY 4 HOURS PRN
Status: DISCONTINUED | OUTPATIENT
Start: 2023-11-03 | End: 2023-11-04 | Stop reason: HOSPADM

## 2023-11-03 RX ORDER — SIMETHICONE 80 MG
1 TABLET,CHEWABLE ORAL EVERY 6 HOURS PRN
Status: DISCONTINUED | OUTPATIENT
Start: 2023-11-03 | End: 2023-11-04 | Stop reason: HOSPADM

## 2023-11-03 RX ORDER — LABETALOL 200 MG/1
200 TABLET, FILM COATED ORAL EVERY 12 HOURS
Status: DISCONTINUED | OUTPATIENT
Start: 2023-11-03 | End: 2023-11-03

## 2023-11-03 RX ORDER — SODIUM CHLORIDE 0.9 % (FLUSH) 0.9 %
10 SYRINGE (ML) INJECTION
Status: DISCONTINUED | OUTPATIENT
Start: 2023-11-03 | End: 2023-11-04 | Stop reason: HOSPADM

## 2023-11-03 RX ORDER — ACETAMINOPHEN 325 MG/1
650 TABLET ORAL EVERY 6 HOURS PRN
Status: DISCONTINUED | OUTPATIENT
Start: 2023-11-03 | End: 2023-11-04 | Stop reason: HOSPADM

## 2023-11-03 RX ADMIN — LABETALOL HYDROCHLORIDE 200 MG: 200 TABLET, FILM COATED ORAL at 06:11

## 2023-11-03 RX ADMIN — ACETAMINOPHEN 1000 MG: 500 TABLET ORAL at 11:11

## 2023-11-03 RX ADMIN — SODIUM CHLORIDE 1000 ML: 9 INJECTION, SOLUTION INTRAVENOUS at 11:11

## 2023-11-03 RX ADMIN — ACETAMINOPHEN 1000 MG: 500 TABLET ORAL at 05:11

## 2023-11-03 RX ADMIN — HYDRALAZINE HYDROCHLORIDE 10 MG: 10 TABLET, FILM COATED ORAL at 08:11

## 2023-11-03 NOTE — H&P
[facility] - Emergency Dept  Obstetrics  History & Physical    Patient Name: Brianne Blas  MRN: [id number]  Admission Date: 11/3/2023  Primary Care Provider: Vashti López MD    Subjective:     Principal Problem:Abdominal pain affecting pregnancy    History of Present Illness: Brianne Blas is a 43 y.o. female  presents to the ED at [facility] with complaint of abdominal pain.    Patient has been seeing Dr. Cm for infertility. She has successfully conceived via Clomid. Bchg levels have been trended by Dr. Cm but pregnancy has not yet been visualized. She reports waking today with severe left lower quadrant pain. She states the pain is sharp and shooting to her vagina. She denies vaginal bleeding, nausea or vomiting.     Ultrasound performed in the ED at [facility] shows:    FINDINGS:  Enlarged uterus on the basis of multiple uterine fibroids.  Uterus measures 16.4 x 8.2 x 14.8 cm.  The largest 3 uterine fibroids measure 3.6 x 3.8 x 4.2 cm, 8.0 x 7.5 x 7.6 cm and 6.5 x 4.6 x 4.1 cm.  Fibroids partially obscure the endometrium although the endometrium can be visualized near the fundus and measures 1.6 cm.  No intrauterine gestational sac is identified.     Intrauterine gestation(s): Not visualized     Right ovary: 2.2 x 2.2 x 4.1 cm.  Right ovary appears normal with arterial and venous flow.     Left ovary: Left ovary is not visualized.  5.7 x 3.6 x 2.7 cm anechoic area in the left adnexa.  No identifiable ovarian tissue, gestational sac or fetal pole.     Miscellaneous: Small volume of simple appearing free pelvic fluid.     Impression:     Enlarged uterus with multiple uterine fibroids slightly limiting evaluation.     No intrauterine gestational sac is visualized.  Cystic appearing area in the left adnexa without ovarian tissue, gestational sac or fetal pole.  Overall, these findings are compatible with pregnancy of unknown location which could relate to very early pregnancy,  miscarriage or unidentified ectopic pregnancy.  In a hemodynamically stable patient, recommend serial beta HCGs and follow-up with OBGYN.     Small volume of free pelvic fluid.    OB History    Para Term  AB Living   3 0 0 0 2 0   SAB IAB Ectopic Multiple Live Births   2 0 0 0 0      # Outcome Date GA Lbr Padilla/2nd Weight Sex Delivery Anes PTL Lv   3 Current            2 SAB 10/2016           1 SAB 2016             Past Medical History:   Diagnosis Date    Fibroids     Hypertension     Migraine     Severe obesity (BMI 35.0-39.9) with comorbidity      Past Surgical History:   Procedure Laterality Date    MYOMECTOMY         (Not in a hospital admission)      Review of patient's allergies indicates:   Allergen Reactions    Nifedipine Edema        Family History       Problem Relation (Age of Onset)    Breast cancer Mother (40), Maternal Grandmother    Cancer Mother    Hypertension Father    Iron deficiency Sister    No Known Problems Brother          Tobacco Use    Smoking status: Never    Smokeless tobacco: Never   Substance and Sexual Activity    Alcohol use: Yes     Comment: Occasionally    Drug use: No    Sexual activity: Yes     Partners: Male     Birth control/protection: None     Review of Systems   Constitutional:  Negative for chills and fever.   Respiratory:  Negative for cough and shortness of breath.    Cardiovascular:  Negative for chest pain.   Gastrointestinal:  Positive for abdominal pain. Negative for nausea and vomiting.   Genitourinary:  Negative for vaginal bleeding.   Musculoskeletal:  Negative for back pain.   Integumentary:  Negative for rash and breast tenderness.   Neurological:  Negative for headaches.   Psychiatric/Behavioral:  Negative for depression.    Breast: Negative for tenderness     Objective:     Vital Signs (Most Recent):  Temp: 98.1 °F (36.7 °C) (23)  Pulse: 78 (23)  Resp: 18 (23)  BP: (!) 180/86 (23)  SpO2: 100 % (23  1658) Vital Signs (24h Range):  Temp:  [98 °F (36.7 °C)-98.9 °F (37.2 °C)] 98.1 °F (36.7 °C)  Pulse:  [78-86] 78  Resp:  [16-18] 18  SpO2:  [98 %-100 %] 100 %  BP: (173-190)/(77-99) 180/86     Weight: 122.5 kg (270 lb)  Body mass index is 42.29 kg/m².    Physical Exam:   Constitutional: She is oriented to person, place, and time. She appears well-developed and well-nourished.    HENT:   Head: Normocephalic and atraumatic.    Eyes: EOM are normal.     Cardiovascular:  Normal rate and regular rhythm.      Exam reveals no clubbing, no cyanosis and no edema.        Pulmonary/Chest: Effort normal.        Abdominal: Soft. She exhibits no distension. There is abdominal tenderness.   Tender in left lower quadrant.     Genitourinary:    Genitourinary Comments: Deferred as room was not available for exam             Musculoskeletal: Normal range of motion and moves all extremeties.       Neurological: She is alert and oriented to person, place, and time.    Skin: Skin is warm and dry. No cyanosis. Nails show no clubbing.    Psychiatric: She has a normal mood and affect.       I have personallly reviewed all pertinent lab results from the last 24 hours.  Recent Lab Results  (Last 5 results in the past 24 hours)        11/03/23  1106   11/03/23  1055   11/03/23  1053   11/03/23  1053   11/03/23  1052        Albumin 3.5               ALP 44               ALT 25               Anion Gap 9               Appearance, UA       Clear         AST 23               Bacteria - Vaginal Screen         Moderate       Baso # 0.05               Basophil % 0.6               Bilirubin (UA)       Negative         BILIRUBIN TOTAL 0.4  Comment: For infants and newborns, interpretation of results should be based  on gestational age, weight and in agreement with clinical  observations.    Premature Infant recommended reference ranges:  Up to 24 hours.............<8.0 mg/dL  Up to 48 hours............<12.0 mg/dL  3-5 days..................<15.0  mg/dL  6-29 days.................<15.0 mg/dL                 Budding Yeast         None       BUN 9               Calcium 9.0               Chlamydia, Amplified DNA   Not Detected             Chloride 111               Clue Cells, Wet Prep         None       CO2 17               Color, UA       Colorless         Creatinine 0.7               Differential Method Automated               eGFR >60.0               Eos # 0.1               Eosinophil % 1.3               Fungal Hyphae         None       Glucose 80               Glucose, UA       Negative         Gran # (ANC) 5.6               Gran % 68.3               Group & Rh A POS               HCG Quant 1415  Comment: Quantitative Total HCG Reference Ranges:  Males.....................<5.0 mIU/mL  Non-Pregnant Females:  18Y-41Y pre-menopausal....<2.4 mIU/mL  42Y-55Y shahriar-menopausal...<=4.9 mIU/mL  55Y post-menopausal.......<=7.6 mIU/mL  Pregnancy:  Weeks post-LMP..........Range (mIU/mL)  1-10  weeks................202-231,000  11-15 weeks.............22,536-234,990  16-22 weeks...............8,007-50,064  23-40 weeks...............1,600-49,413    NOTE:  This assay is not FDA approved for tumor screening,   diagnosis, or monitoring.                 Hematocrit 32.5               Hemoglobin 10.5               Hepatitis C Ab Non-reactive               HIV 1/2 Ag/Ab Non-reactive               Immature Grans (Abs) 0.02  Comment: Mild elevation in immature granulocytes is non specific and   can be seen in a variety of conditions including stress response,   acute inflammation, trauma and pregnancy. Correlation with other   laboratory and clinical findings is essential.                 Immature Granulocytes 0.2               Ketones, UA       Negative         Leukocytes, UA       Negative         Lymph # 1.8               Lymph % 21.7               MCH 29.3               MCHC 32.3               MCV 91               Mono # 0.6               Mono % 7.9               MPV 10.4                N gonorrhoeae, amplified DNA   Not Detected             NITRITE UA       Negative         nRBC 0               Occult Blood UA       Negative         pH, UA       6.0         Platelet Count 262               Potassium 3.9               Preg Test, Ur     Positive           PROTEIN TOTAL 7.2               Protein, UA       Negative  Comment: Recommend a 24 hour urine protein or a urine   protein/creatinine ratio if globulin induced proteinuria is  clinically suspected.            Acceptable     Yes           RBC 3.58               RDW 16.6               Sodium 137               Specific [place], UA       1.000         Specimen UA       Urine, Clean Catch         Trichomonas         None       WBC - Vaginal Screen         Rare       Wet Prep Source         Vagina       WBC 8.15                                      Assessment/Plan:     43 y.o. female  at Unknown for:    Active Diagnoses:    Diagnosis Date Noted POA    PRINCIPAL PROBLEM:  Abdominal pain affecting pregnancy [O26.899, R10.9] 2023 Yes      Problems Resolved During this Admission:       Discussed with patient, source of abdominal pain is unclear. Bhcg is below level to see fetal pole. Discussed this may be a normal pregnancy, an abnormal intrauterine pregnancy or an ectopic pregnancy. Discussed the need to repeat the hcg in 48 hours. Also discussed she has very large fibroids that could be causing the pain. However, because she reports pain has not resolved and she feels it may be getting worse, recommended observing in the hospital. If pain worsens, will repeat ultrasound.     Sarah Hammonds MD  Obstetrics  [facility] - Emergency Dept

## 2023-11-03 NOTE — ED PROVIDER NOTES
Encounter Date: 11/3/2023       History     Chief Complaint   Patient presents with    Abdominal Pain     LLQ pain and vaginal bleeding, positive at home pregnancy test, blood with wiping     43-year-old female presenting with left pelvic pain.    PMH:  HTN, migraine, fibroids     Two prior miscarriages     The patient is currently following with [facility].  She states she would be approximately 7 weeks pregnant but she had an ultrasound recently that did not show an intrauterine pregnancy.  She developed left lower pelvic pain that woke her from sleep this morning.  She has had some brown discharge, but no significant bleeding.  She is concerned, because this does not feel similar to her prior miscarriages and she would like to be evaluated for an ectopic pregnancy.  She was receiving Clomid per the fertility clinic.  Denies dysuria or flank pain.  She has not taken anything for pain.    The history is provided by the patient and medical records. No  was used.     Review of patient's allergies indicates:   Allergen Reactions    Nifedipine Edema     Past Medical History:   Diagnosis Date    Fibroids     Hypertension     Migraine     Severe obesity (BMI 35.0-39.9) with comorbidity      Past Surgical History:   Procedure Laterality Date    MYOMECTOMY       Family History   Problem Relation Age of Onset    Breast cancer Mother 40        Breast Cancer x 2    Cancer Mother     Hypertension Father     Iron deficiency Sister     No Known Problems Brother     Breast cancer Maternal Grandmother     Colon cancer Neg Hx     Ovarian cancer Neg Hx      Social History     Tobacco Use    Smoking status: Never    Smokeless tobacco: Never   Substance Use Topics    Alcohol use: Yes     Comment: Occasionally    Drug use: No         Physical Exam     Initial Vitals [23 0939]   BP Pulse Resp Temp SpO2   (!) 173/77 86 16 98 °F (36.7 °C) 99 %      MAP       --         Physical Exam    Nursing note and  vitals reviewed.  Constitutional: She is not diaphoretic. No distress.   HENT:   Head: Normocephalic and atraumatic.   Eyes: Right eye exhibits no discharge. Left eye exhibits no discharge.   Neck: Neck supple. No tracheal deviation present.   Cardiovascular:  Normal rate and regular rhythm.           Pulmonary/Chest: Breath sounds normal. No respiratory distress.   Abdominal: Abdomen is soft. There is abdominal tenderness (LLQ).   Genitourinary: There is no lesion on the right labia. There is no lesion on the left labia. Uterus is not tender. Cervix exhibits no motion tenderness. Right adnexum displays no mass and no tenderness. Left adnexum displays tenderness. Left adnexum displays no mass.    Vaginal discharge present.      Genitourinary Comments: RN Rica Palomino present for exam, performed with patient's permission, consented to GC/CT testing    Dark brown discharge, os closed      Musculoskeletal:      Cervical back: Neck supple.     Neurological: She is alert and oriented to person, place, and time.   Skin: Skin is warm. No rash noted.   Psychiatric: She has a normal mood and affect. Her behavior is normal.         ED Course   Procedures  Labs Reviewed   VAGINAL SCREEN - Abnormal; Notable for the following components:       Result Value    WBC - Vaginal Screen Rare (*)     Bacteria - Vaginal Screen Moderate (*)     All other components within normal limits    Narrative:     Release to patient->Immediate   URINALYSIS, REFLEX TO URINE CULTURE - Abnormal; Notable for the following components:    Color, UA Colorless (*)     Specific [place], UA 1.000 (*)     All other components within normal limits    Narrative:     Specimen Source->Urine   CBC W/ AUTO DIFFERENTIAL - Abnormal; Notable for the following components:    RBC 3.58 (*)     Hemoglobin 10.5 (*)     Hematocrit 32.5 (*)     RDW 16.6 (*)     All other components within normal limits    Narrative:     Release to patient->Immediate   COMPREHENSIVE  METABOLIC PANEL - Abnormal; Notable for the following components:    Chloride 111 (*)     CO2 17 (*)     Alkaline Phosphatase 44 (*)     All other components within normal limits    Narrative:     Release to patient->Immediate   POCT URINE PREGNANCY - Abnormal; Notable for the following components:    POC Preg Test, Ur Positive (*)     All other components within normal limits   C. TRACHOMATIS/N. GONORRHOEAE BY AMP DNA    Narrative:     Sources by Resulting Lab:->[facility]  Release to patient->Immediate   HIV 1 / 2 ANTIBODY    Narrative:     Release to patient->Immediate   HEPATITIS C ANTIBODY    Narrative:     Release to patient->Immediate   HCG, QUANTITATIVE    Narrative:     Release to patient->Immediate   GROUP & RH          Imaging Results               US OB <14 Wks TransAbd & TransVag, Single Gestation (XPD) (Final result)  Result time 11/03/23 13:26:05      Final result by Hardeep Martinez MD (11/03/23 13:26:05)                   Impression:      Enlarged uterus with multiple uterine fibroids slightly limiting evaluation.    No intrauterine gestational sac is visualized.  Cystic appearing area in the left adnexa without ovarian tissue, gestational sac or fetal pole.  Overall, these findings are compatible with pregnancy of unknown location which could relate to very early pregnancy, miscarriage or unidentified ectopic pregnancy.  In a hemodynamically stable patient, recommend serial beta HCGs and follow-up with OBGYN.    Small volume of free pelvic fluid.    This report was flagged in Epic as abnormal.      Electronically signed by: Hardeep Martinez MD  Date:    11/03/2023  Time:    13:26               Narrative:    EXAMINATION:  US OB <14 WEEKS, TRANSABDOM & TRANSVAG, SINGLE GESTATION (XPD)    CLINICAL HISTORY:  left pelvic pain, r/o ectopic;    TECHNIQUE:  Transabdominal sonography of the pelvis was performed, followed by transvaginal sonography to better evaluate the uterus and  ovaries.    COMPARISON:  12/20/2017    FINDINGS:  Enlarged uterus on the basis of multiple uterine fibroids.  Uterus measures 16.4 x 8.2 x 14.8 cm.  The largest 3 uterine fibroids measure 3.6 x 3.8 x 4.2 cm, 8.0 x 7.5 x 7.6 cm and 6.5 x 4.6 x 4.1 cm.  Fibroids partially obscure the endometrium although the endometrium can be visualized near the fundus and measures 1.6 cm.  No intrauterine gestational sac is identified.    Intrauterine gestation(s): Not visualized    Right ovary: 2.2 x 2.2 x 4.1 cm.  Right ovary appears normal with arterial and venous flow.    Left ovary: Left ovary is not visualized.  5.7 x 3.6 x 2.7 cm anechoic area in the left adnexa.  No identifiable ovarian tissue, gestational sac or fetal pole.    Miscellaneous: Small volume of simple appearing free pelvic fluid.                                       Medications   sodium chloride 0.9% bolus 1,000 mL 1,000 mL (0 mLs Intravenous Stopped 11/3/23 1255)   acetaminophen tablet 1,000 mg (1,000 mg Oral Given 11/3/23 1120)     Medical Decision Making  43-year-old female presenting with brown discharge, left pelvic pain.    Differential including, but not limited to:  Round ligament pain, ovarian torsion, ectopic pregnancy, early pregnancy versus pregnancy of unknown location    HGB down-tending today. US demonstrates fibroids, no IUP, cystic area in left adnexa.  Pelvic exam not concerning for PID, but with left adnexal tenderness.  Given her pain and US results, case discussed with OB Dr. Hammonds at [place], as there are no OB services at this facility.  Plan for transfer for OB evaluation, ectopic pregnancy in ddx. Patient informed of imaging results, agrees with plan for transfer.     Amount and/or Complexity of Data Reviewed  External Data Reviewed: notes.     Details: Recent ultrasound report not available in EMR   Labs: ordered. Decision-making details documented in ED Course.     Details: Rh positive, hemoglobin stable   Radiology:  ordered.    Risk  OTC drugs.               ED Course as of 11/04/23 0542   Fri Nov 03, 2023   1053 Patient reports her beta hCG was last drawn on Wednesday and was 1600. [AB]   1054 Preg Test, Ur(!): Positive [AB]   1231 Hemoglobin(!): 10.5  Stable, most recent 10 [AB]   1231 WBC: 8.15  No leukocytosis  [AB]   1231 CO2(!): 17 [AB]   1232 NITRITE UA: Negative [AB]   1232 Leukocytes, UA: Negative  No UTI  [AB]   1232 Group & Rh: A POS [AB]   1232 HCG Quant: 1415  Down-trending  [AB]      ED Course User Index  [AB] Nate Leahy MD                      Clinical Impression:   Final diagnoses:  [O36.80X0] Pregnancy of unknown anatomic location (Primary)  [R10.2] Pelvic pain  [D21.9] Fibroid        ED Disposition Condition    Transfer to Another Facility Stable                Nate Leahy MD  11/04/23 0542

## 2023-11-03 NOTE — ED NOTES
Patient states left flank pain x 2 days, reports sharp vaginal pain at 0330, reports brown discharge only with wiping, has had USand 3 HCG levels

## 2023-11-03 NOTE — ED NOTES
MD Cassius made aware of elevated BP. Patient explains she hasn't taken her night BP meds. Awaiting orders.

## 2023-11-03 NOTE — PROVIDER PROGRESS NOTES - EMERGENCY DEPT.
Encounter Date: 11/3/2023    ED Physician Progress Notes            Patient is a 43-year-old female transferred for concern of ectopic pregnancy.  Decreasing beta hCG.  On exam, patient not complaining of vaginal bleeding, but of abdominal pain.  Will give Tylenol.  Dr. Jeffery aware that patient is in the ER.

## 2023-11-03 NOTE — HPI
Brianne Blas is a 43 y.o. female  presents tot he ED with complaint of abdominal pain.    Patient has been seeing Dr. Cm for infertility. She has successfully conceived via Clomid. Bchg levels have been trended by Dr. Cm but pregnancy has not yet been visualized. She reports waking today with severe left lower quadrant pain. She states the pain is sharp and shooting to her vagina. She denies vaginal bleeding, nausea or vomiting.

## 2023-11-03 NOTE — PROVIDER PROGRESS NOTES - EMERGENCY DEPT.
Encounter Date: 11/3/2023    ED Physician Progress Notes            Dr Hammonds to admit pt for observation

## 2023-11-03 NOTE — ED NOTES
Patient presents to the ED via EMS from [facility] for evaluation of possible ectopic pregnancy. Patient explains her LMP was 9/16/23. States she had a positive pregnancy test at home and Hcg ordered Wednesday (1600). States for the last two weeks she has had intermittent vaginal bleeding, explains not enough to fill a panty liner. However, patient explains severe pain to the LLQ radiating into the left pelvic region that began today. Patient explains that labs were obtained and transvaginal and abd u/s were performed today. States her Hcg decreased (1400). Pain is described as a burning like pain; last dose of tylenol around 1100am. Pt denies hx of ectopic but does endorse hx of 2 miscarriages. Patient is alert and oriented. Denies needs. Updated on care plan. Repositioned on ED stretcher and provided with warm blanket for optimal comfort. Awaiting orders. Wctm.

## 2023-11-04 VITALS
RESPIRATION RATE: 18 BRPM | HEART RATE: 68 BPM | SYSTOLIC BLOOD PRESSURE: 154 MMHG | TEMPERATURE: 98 F | WEIGHT: 257.94 LBS | OXYGEN SATURATION: 99 % | BODY MASS INDEX: 40.48 KG/M2 | DIASTOLIC BLOOD PRESSURE: 73 MMHG | HEIGHT: 67 IN

## 2023-11-04 DIAGNOSIS — O36.80X0 PREGNANCY, LOCATION UNKNOWN: Primary | ICD-10-CM

## 2023-11-04 PROCEDURE — G0378 HOSPITAL OBSERVATION PER HR: HCPCS

## 2023-11-04 PROCEDURE — 99232 SBSQ HOSP IP/OBS MODERATE 35: CPT | Mod: ,,, | Performed by: OBSTETRICS & GYNECOLOGY

## 2023-11-04 PROCEDURE — 99232 PR SUBSEQUENT HOSPITAL CARE,LEVL II: ICD-10-PCS | Mod: ,,, | Performed by: OBSTETRICS & GYNECOLOGY

## 2023-11-04 PROCEDURE — 25000003 PHARM REV CODE 250: Performed by: OBSTETRICS & GYNECOLOGY

## 2023-11-04 RX ORDER — OXYCODONE HYDROCHLORIDE 5 MG/1
5 TABLET ORAL ONCE
Status: CANCELLED | OUTPATIENT
Start: 2023-11-04

## 2023-11-04 RX ADMIN — PRENATAL VIT W/ FE FUMARATE-FA TAB 27-0.8 MG 1 TABLET: 27-0.8 TAB at 08:11

## 2023-11-04 RX ADMIN — LABETALOL HYDROCHLORIDE 200 MG: 200 TABLET, FILM COATED ORAL at 08:11

## 2023-11-04 RX ADMIN — HYDRALAZINE HYDROCHLORIDE 10 MG: 10 TABLET, FILM COATED ORAL at 08:11

## 2023-11-04 RX ADMIN — ACETAMINOPHEN 650 MG: 325 TABLET ORAL at 12:11

## 2023-11-04 RX ADMIN — ACETAMINOPHEN 650 MG: 325 TABLET ORAL at 02:11

## 2023-11-04 NOTE — ED NOTES
Pt sitting up in bed eating meal provided by pt spouse. Chris. Call light within reach. Awaiting bed assignment

## 2023-11-04 NOTE — PROGRESS NOTES
11/03/23 2337   Admission   Initial VN Admission Questions Complete   Communication Issues? Technical Issue  (no vidyo monitor)   Shift   Pain Management Interventions pain management plan reviewed with patient/caregiver     No vidyo monitor in pt's room. VN called pt's room telephone and admission questions over telephone with pt's permission. Plan of care reviewed with pt. Pt denies any needs at this time. Instructed to call for any needs.

## 2023-11-04 NOTE — PHARMACY MED REC
"[facility]           Pharmacy  Admission Medication History     The home medication history was taken by Pita Lopze.      Medication history obtained from Medications listed below were obtained from: [employer] software- [employer]    Based on information gathered for medication list, you may go to "Admission" then "Reconcile Home Medications" tabs to review and/or act upon those items.     The home medication list has been updated by the Pharmacy department.   Please read ALL comments highlighted in yellow.   Please address this information as you see fit.    Feel free to contact us if you have any questions or require assistance.        Current Facility-Administered Medications on File Prior to Encounter   Medication Dose Route Frequency Provider Last Rate Last Admin    [COMPLETED] acetaminophen tablet 1,000 mg  1,000 mg Oral ED 1 Time Nate Leahy MD   1,000 mg at 11/03/23 1120    [COMPLETED] sodium chloride 0.9% bolus 1,000 mL 1,000 mL  1,000 mL Intravenous ED 1 Time Nate Leahy MD   Stopped at 11/03/23 1255     Current Outpatient Medications on File Prior to Encounter   Medication Sig Dispense Refill    hydrALAZINE (APRESOLINE) 10 MG tablet Take 1 tablet (10 mg total) by mouth every 12 (twelve) hours. 180 tablet 3    labetaloL (NORMODYNE) 200 MG tablet Take 1 tablet (200 mg total) by mouth 2 (two) times daily. 180 tablet 3    sumatriptan (IMITREX) 25 MG Tab Take 1 tablet (25 mg total) by mouth every 2 (two) hours as needed (migraines, not to exceed 2 doses in 24 hours). every 2 hours (no more than twice a day) 9 tablet 5    CLOMID 50 mg tablet Take 50 mg by mouth once daily.      ibuprofen (ADVIL,MOTRIN) 800 MG tablet Take 800 mg by mouth 3 (three) times daily.      Lactobacillus rhamnosus GG (CULTURELLE) 10 billion cell capsule Take 1 capsule by mouth once daily.      ondansetron (ZOFRAN) 8 MG tablet Take 8 mg by mouth 2 (two) times daily.      PRENATAL VIT CALC,IRON,FOLIC " (PRENATAL VITAMIN ORAL) Take by mouth.         Please address this information as you see fit.  Feel free to contact us if you have any questions or require assistance.    Piat Lopez  [phone]                  .

## 2023-11-04 NOTE — ED NOTES
Report received from HUGO Cohen. Care of pt assumed. Pt aaox4. Neuro intact. Resp even and unlabored. Nadn. Pt voiced no complaints or concerns at this time. Vss. Awaiting bed assignment. Pt aware of poc. Bp/o2 monitoring in place. Call light within reach. Pt given warm blanket for comfort

## 2023-11-04 NOTE — PLAN OF CARE
TATIANA spoke with pt via room phone to discuss discharge planning. Pt will dc with no HH or DME needs noted. TATIANA requested pt f/u appts. Pt mother at bedside will assist pt with transportation home at discharge.    TATIANA requested OBGYN f/u appt.    Pt cleared from CM standpoint. Bedside nurse and VN notified.    Future Appointments   Date Time Provider Department Center   12/4/2023  7:30 AM LAB, [facility]   12/12/2023 10:15 AM [facility] OIC-MRI2 [facility]   12/21/2023  8:00 AM Bridget Randhawa MD [facility] HEMONC3 [facility]        11/04/23 1459   Final Note   Assessment Type Final Discharge Note   Anticipated Discharge Disposition Home   What phone number can be called within the next 1-3 days to see how you are doing after discharge? [phone]   Post-Acute Status   Discharge Delays None known at this time

## 2023-11-04 NOTE — PROGRESS NOTES
[place] [facility]  Obstetrics & Gynecology  Progress Note    Patient Name: Brianne Blas  MRN: [id number]  Admission Date: 11/3/2023  Primary Care Provider: Vashti López MD  Principal Problem: Abdominal pain affecting pregnancy    Subjective:     Interval History: Brianne Blas is a 43 y.o. female  admitted for abdominal pain and pregnancy of unknown location    HD#2 - Patient is resting comfortably. She has been eating and tolerating regular diet.     Scheduled Meds:   hydrALAZINE  10 mg Oral Q12H    labetaloL  200 mg Oral Q12H    prenatal vitamin  1 tablet Oral Daily     Continuous Infusions:  PRN Meds:acetaminophen, diphenhydrAMINE, diphenhydrAMINE, ondansetron, prochlorperazine, senna-docusate 8.6-50 mg, simethicone, sodium chloride 0.9%    Review of patient's allergies indicates:   Allergen Reactions    Nifedipine Edema       Objective:     Vital Signs (Most Recent):  Temp: 98.8 °F (37.1 °C) (23 0845)  Pulse: 73 (23 08)  Resp: 18 (23 0845)  BP: (!) 144/66 (23 0845)  SpO2: 97 % (23 0555) Vital Signs (24h Range):  Temp:  [97 °F (36.1 °C)-98.9 °F (37.2 °C)] 98.8 °F (37.1 °C)  Pulse:  [69-83] 73  Resp:  [16-18] 18  SpO2:  [96 %-100 %] 97 %  BP: (137-210)/(66-99) 144/66     Weight: 117 kg (257 lb 15 oz)  Body mass index is 40.4 kg/m².  Patient's last menstrual period was 2023.    I&O (Last 24H):    Intake/Output Summary (Last 24 hours) at 2023 1247  Last data filed at 2023 0800  Gross per 24 hour   Intake 180 ml   Output --   Net 180 ml       Physical Exam:   Constitutional: She is oriented to person, place, and time. She appears well-developed and well-nourished.    HENT:   Head: Normocephalic.    Eyes: EOM are normal.     Cardiovascular:  Normal rate and regular rhythm.      Exam reveals no clubbing, no cyanosis and no edema.        Pulmonary/Chest: Effort normal and breath sounds normal.        Abdominal: Soft. Bowel sounds are  normal. She exhibits no distension. There is no abdominal tenderness.             Musculoskeletal: Normal range of motion and moves all extremeties.       Neurological: She is alert and oriented to person, place, and time.    Skin: Skin is warm and dry. No cyanosis. Nails show no clubbing.    Psychiatric: She has a normal mood and affect.       Laboratory:  I have personallly reviewed all pertinent lab results from the last 24 hours.    Diagnostic Results:  US: Reviewed  FINDINGS: FINDINGS:  Intrauterine gestation(s): Not identified     Uterus: Enlarged, 15.4 x 9.2 x 17 cm with multiple intramural and subserosal fibroids.  The largest fibroids measure 8.3 x 6.4 x 6.7 cm, 5.6 x 4.8 x 5.3 cm, and 4 x 3.7 x 4.1 cm.  As previously, the fibroids partially obscure the endometrium.  Where the endometrium can be seen, it is not thickened, 12 mm.  No intrauterine gestational sac is identified.     Right ovary: 2.4 x 3.8 x 3 cm with no focal abnormalities.  Arterial and venous flow are identified.     Left ovary: 4.9 x 1.5 x 3.4 cm with a 5 x 3.1 x 3.2 cm cyst, unchanged.  Arterial and venous flow are identified.     Miscellaneous: Small amount of free fluid posterior to the cervix.     Impression:     Pregnancy of unknown location and unknown viability with no identifiable intrauterine gestational sac and differential considerations including early intrauterine pregnancy, miscarriage, and unidentified ectopic pregnancy.  Small amount of free fluid remains.  No significant interval change since November 3, 2023 at 12:11.     This report was flagged in Epic as abnormal.       Assessment/Plan:     Active Diagnoses:    Diagnosis Date Noted POA    PRINCIPAL PROBLEM:  Abdominal pain affecting pregnancy [O26.899, R10.9] 11/03/2023 Yes      Problems Resolved During this Admission:       In review of ultrasound, patient has a left ovarian cyst - most likely a corpus luteal cyst. Discussed with patient and family, still under  consideration is normal pregnancy, abnormal intrauterine pregnancy, ectopic pregnancy. No definitive diagnosis at this time. Recommended repeating hcg on Monday. Orders placed.     Strict precautions given to return if pain worsens.     Sarah Hammonds MD  Obstetrics & Gynecology  [place] [facility]

## 2023-11-04 NOTE — PROGRESS NOTES
Virtual Nurse:Discharge orders noted; additional clinical references attached.  and pharmacy tech notified.  Patient's discharge instruction packet given by bedside RN.    Called into patient's room via telephone.  Introduced as VN that will be instructing on discharge instructions.  Family member at bedside.  Educated patient on reason for admission; medications to hold, continue, and start, appointment to follow-up with doctor, and when to return to ED. Teach back method used. Verbalized understanding  Number given for 24/7 Nurse Line. Opportunity given for questions and questions answered.  Bedside nurse updated. Transport to [facility] requested.        11/04/23 1619   Shift   Virtual Nurse - Patient Verbalized Approval Of Other (See Comments)  (no camera in room 475)

## 2023-11-04 NOTE — PLAN OF CARE
Problem:  Fall Injury Risk  Goal: Absence of Fall, Infant Drop and Related Injury  Outcome: Ongoing, Progressing     Problem: Adult Inpatient Plan of Care  Goal: Plan of Care Review  Outcome: Ongoing, Progressing     Problem: Pain Acute  Goal: Acceptable Pain Control and Functional Ability  Outcome: Ongoing, Progressing     Problem: Hypertension Comorbidity  Goal: Blood Pressure in Desired Range  Outcome: Ongoing, Progressing

## 2023-11-05 NOTE — DISCHARGE SUMMARY
"[facility]  Obstetrics & Gynecology  Discharge Summary    Patient Name: Brianne Blas  MRN: [id number]  Admission Date: 11/3/2023  Hospital Length of Stay: 0 days  Discharge Date and Time: 2023  6:40 PM  Attending Physician: No att. providers found   Discharging Provider: Sarah Hammonds MD  Primary Care Provider: Vashti López MD    HPI: Brianne Blas is a 43 y.o. female  admitted for abdominal pain and pregnancy of unknown location     Hospital Course: HD#2 - Patient is resting comfortably. She has been eating and tolerating regular diet.      * No surgery found *     Consults:     Significant Diagnostic Studies: Labs: CBC No results for input(s): "WBC", "HGB", "HCT", "PLT" in the last 48 hours. and All labs within the past 24 hours have been reviewed  Radiology: Ultrasound: FINDINGS:  Intrauterine gestation(s): Not identified     Uterus: Enlarged, 15.4 x 9.2 x 17 cm with multiple intramural and subserosal fibroids.  The largest fibroids measure 8.3 x 6.4 x 6.7 cm, 5.6 x 4.8 x 5.3 cm, and 4 x 3.7 x 4.1 cm.  As previously, the fibroids partially obscure the endometrium.  Where the endometrium can be seen, it is not thickened, 12 mm.  No intrauterine gestational sac is identified.     Right ovary: 2.4 x 3.8 x 3 cm with no focal abnormalities.  Arterial and venous flow are identified.     Left ovary: 4.9 x 1.5 x 3.4 cm with a 5 x 3.1 x 3.2 cm cyst, unchanged.  Arterial and venous flow are identified.     Miscellaneous: Small amount of free fluid posterior to the cervix.     Impression:     Pregnancy of unknown location and unknown viability with no identifiable intrauterine gestational sac and differential considerations including early intrauterine pregnancy, miscarriage, and unidentified ectopic pregnancy.  Small amount of free fluid remains.  No significant interval change since November 3, 2023 at 12:11.     This report was flagged in Epic as " abnormal.    Pending Diagnostic Studies:       None          Final Active Diagnoses:    Diagnosis Date Noted POA    PRINCIPAL PROBLEM:  Abdominal pain affecting pregnancy [O26.899, R10.9] 11/03/2023 Yes      Problems Resolved During this Admission:        Discharged Condition: good    Disposition: Home or Self Care    Follow Up:   Follow-up Information       [facility] Follow up.    Why: Sched[facility]ators will contact patient with upcoming OBGYN follow-up appointment.             MyChart Follow up.    Why: Please check your MyChart for any upcoming follow up appointment dates/times.                         Patient Instructions:      Notify your health care provider if you experience any of the following:  temperature >100.4     Notify your health care provider if you experience any of the following:  persistent nausea and vomiting or diarrhea     Notify your health care provider if you experience any of the following:  severe uncontrolled pain     Notify your health care provider if you experience any of the following:  redness, tenderness, or signs of infection (pain, swelling, redness, odor or green/yellow discharge around incision site)     Notify your health care provider if you experience any of the following:  difficulty breathing or increased cough     Notify your health care provider if you experience any of the following:  severe persistent headache     Notify your health care provider if you experience any of the following:  worsening rash     Notify your health care provider if you experience any of the following:  persistent dizziness, light-headedness, or visual disturbances     Notify your health care provider if you experience any of the following:  increased confusion or weakness     Medications:  Reconciled Home Medications:      Medication List        CONTINUE taking these medications      hydrALAZINE 10 MG tablet  Commonly known as: APRESOLINE  Take 1 tablet (10 mg total) by mouth  every 12 (twelve) hours.     labetaloL 200 MG tablet  Commonly known as: NORMODYNE  Take 1 tablet (200 mg total) by mouth 2 (two) times daily.     Lactobacillus rhamnosus GG 10 billion cell capsule  Commonly known as: CULTURELLE  Take 1 capsule by mouth once daily.     sumatriptan 25 MG Tab  Commonly known as: IMITREX  Take 1 tablet (25 mg total) by mouth every 2 (two) hours as needed (migraines, not to exceed 2 doses in 24 hours). every 2 hours (no more than twice a day)            STOP taking these medications      CLOMID 50 mg tablet  Generic drug: clomiPHENE     ibuprofen 800 MG tablet  Commonly known as: ADVIL,MOTRIN     ondansetron 8 MG tablet  Commonly known as: ZOFRAN     PRENATAL VITAMIN ORAL              Sarah Hammonds MD  Obstetrics & Gynecology  [place] - [facility]

## 2023-11-06 ENCOUNTER — PATIENT MESSAGE (OUTPATIENT)
Dept: OBSTETRICS AND GYNECOLOGY | Facility: CLINIC | Age: 43
End: 2023-11-06
Payer: COMMERCIAL

## 2023-11-06 ENCOUNTER — OFFICE VISIT (OUTPATIENT)
Dept: OBSTETRICS AND GYNECOLOGY | Facility: CLINIC | Age: 43
End: 2023-11-06
Payer: COMMERCIAL

## 2023-11-06 ENCOUNTER — INFUSION (OUTPATIENT)
Dept: INFUSION THERAPY | Facility: HOSPITAL | Age: 43
End: 2023-11-06
Attending: INTERNAL MEDICINE
Payer: COMMERCIAL

## 2023-11-06 VITALS
WEIGHT: 263.44 LBS | SYSTOLIC BLOOD PRESSURE: 179 MMHG | DIASTOLIC BLOOD PRESSURE: 110 MMHG | BODY MASS INDEX: 41.26 KG/M2

## 2023-11-06 VITALS
HEART RATE: 72 BPM | TEMPERATURE: 99 F | DIASTOLIC BLOOD PRESSURE: 78 MMHG | WEIGHT: 263.44 LBS | BODY MASS INDEX: 41.35 KG/M2 | RESPIRATION RATE: 18 BRPM | HEIGHT: 67 IN | SYSTOLIC BLOOD PRESSURE: 168 MMHG | OXYGEN SATURATION: 99 %

## 2023-11-06 DIAGNOSIS — O36.80X0 PREGNANCY, LOCATION UNKNOWN: Primary | ICD-10-CM

## 2023-11-06 PROCEDURE — 3044F PR MOST RECENT HEMOGLOBIN A1C LEVEL <7.0%: ICD-10-PCS | Mod: CPTII,S$GLB,, | Performed by: OBSTETRICS & GYNECOLOGY

## 2023-11-06 PROCEDURE — 1160F PR REVIEW ALL MEDS BY PRESCRIBER/CLIN PHARMACIST DOCUMENTED: ICD-10-PCS | Mod: CPTII,S$GLB,, | Performed by: OBSTETRICS & GYNECOLOGY

## 2023-11-06 PROCEDURE — 99214 PR OFFICE/OUTPT VISIT, EST, LEVL IV, 30-39 MIN: ICD-10-PCS | Mod: S$GLB,,, | Performed by: OBSTETRICS & GYNECOLOGY

## 2023-11-06 PROCEDURE — 1159F PR MEDICATION LIST DOCUMENTED IN MEDICAL RECORD: ICD-10-PCS | Mod: CPTII,S$GLB,, | Performed by: OBSTETRICS & GYNECOLOGY

## 2023-11-06 PROCEDURE — 3077F SYST BP >= 140 MM HG: CPT | Mod: CPTII,S$GLB,, | Performed by: OBSTETRICS & GYNECOLOGY

## 2023-11-06 PROCEDURE — 3044F HG A1C LEVEL LT 7.0%: CPT | Mod: CPTII,S$GLB,, | Performed by: OBSTETRICS & GYNECOLOGY

## 2023-11-06 PROCEDURE — 3080F DIAST BP >= 90 MM HG: CPT | Mod: CPTII,S$GLB,, | Performed by: OBSTETRICS & GYNECOLOGY

## 2023-11-06 PROCEDURE — 3077F PR MOST RECENT SYSTOLIC BLOOD PRESSURE >= 140 MM HG: ICD-10-PCS | Mod: CPTII,S$GLB,, | Performed by: OBSTETRICS & GYNECOLOGY

## 2023-11-06 PROCEDURE — 3008F BODY MASS INDEX DOCD: CPT | Mod: CPTII,S$GLB,, | Performed by: OBSTETRICS & GYNECOLOGY

## 2023-11-06 PROCEDURE — 1160F RVW MEDS BY RX/DR IN RCRD: CPT | Mod: CPTII,S$GLB,, | Performed by: OBSTETRICS & GYNECOLOGY

## 2023-11-06 PROCEDURE — 99214 OFFICE O/P EST MOD 30 MIN: CPT | Mod: S$GLB,,, | Performed by: OBSTETRICS & GYNECOLOGY

## 2023-11-06 PROCEDURE — 99999 PR PBB SHADOW E&M-EST. PATIENT-LVL III: CPT | Mod: PBBFAC,,, | Performed by: OBSTETRICS & GYNECOLOGY

## 2023-11-06 PROCEDURE — 96401 CHEMO ANTI-NEOPL SQ/IM: CPT

## 2023-11-06 PROCEDURE — 63600175 PHARM REV CODE 636 W HCPCS: Performed by: OBSTETRICS & GYNECOLOGY

## 2023-11-06 PROCEDURE — 3080F PR MOST RECENT DIASTOLIC BLOOD PRESSURE >= 90 MM HG: ICD-10-PCS | Mod: CPTII,S$GLB,, | Performed by: OBSTETRICS & GYNECOLOGY

## 2023-11-06 PROCEDURE — 1159F MED LIST DOCD IN RCRD: CPT | Mod: CPTII,S$GLB,, | Performed by: OBSTETRICS & GYNECOLOGY

## 2023-11-06 PROCEDURE — 99999 PR PBB SHADOW E&M-EST. PATIENT-LVL III: ICD-10-PCS | Mod: PBBFAC,,, | Performed by: OBSTETRICS & GYNECOLOGY

## 2023-11-06 PROCEDURE — 3008F PR BODY MASS INDEX (BMI) DOCUMENTED: ICD-10-PCS | Mod: CPTII,S$GLB,, | Performed by: OBSTETRICS & GYNECOLOGY

## 2023-11-06 RX ORDER — METHOTREXATE 25 MG/ML
50 INJECTION INTRA-ARTERIAL; INTRAMUSCULAR; INTRATHECAL; INTRAVENOUS
Status: CANCELLED | OUTPATIENT
Start: 2023-11-06

## 2023-11-06 RX ORDER — METHOTREXATE 25 MG/ML
50 INJECTION INTRA-ARTERIAL; INTRAMUSCULAR; INTRATHECAL; INTRAVENOUS
OUTPATIENT
Start: 2023-11-13

## 2023-11-06 RX ORDER — METHOTREXATE 25 MG/ML
50 INJECTION INTRA-ARTERIAL; INTRAMUSCULAR; INTRATHECAL; INTRAVENOUS
Status: COMPLETED | OUTPATIENT
Start: 2023-11-06 | End: 2023-11-06

## 2023-11-06 RX ADMIN — METHOTREXATE 120 MG: 25 SOLUTION INTRA-ARTERIAL; INTRAMUSCULAR; INTRATHECAL; INTRAVENOUS at 03:11

## 2023-11-06 NOTE — TELEPHONE ENCOUNTER
Discussed with patient that hcg has risen from 3 days ago but not appropriately indicating an abnormal pregnancy though the location is unknown.     Discussed treatment options - expectant management with repeat hcg vs. Medical management vs. D&C and if chorionic villi is not present, patient to receive methotrexate.    Risks/benefits discussed. Patient desires to proceed with methotrexate only.     Patient will come to the office today for consent and to receive methotrexate.

## 2023-11-06 NOTE — PLAN OF CARE
Patient tolerated Methotrexate injection well, VSS. Discharge instructions provided and pt d/c in no acute distress.

## 2023-11-06 NOTE — PROGRESS NOTES
CC: Pregnancy unknown location     Brianne Blas is a 43 y.o. female  presents for follow-up of pregnancy of unknown location.    Patient was seen in the ED on Friday for abdominal pain and pregnancy with unknown location.     Patient has been seeing Dr. Cm for infertility. She has successfully conceived via Clomid. Bchg levels have been trended by Dr. Cm but pregnancy has not yet been visualized. She reports waking today with severe left lower quadrant pain. She states the pain is sharp and shooting to her vagina. She denies vaginal bleeding, nausea or vomiting.      Ultrasound performed in the ED at [facility] shows:     FINDINGS:  Enlarged uterus on the basis of multiple uterine fibroids.  Uterus measures 16.4 x 8.2 x 14.8 cm.  The largest 3 uterine fibroids measure 3.6 x 3.8 x 4.2 cm, 8.0 x 7.5 x 7.6 cm and 6.5 x 4.6 x 4.1 cm.  Fibroids partially obscure the endometrium although the endometrium can be visualized near the fundus and measures 1.6 cm.  No intrauterine gestational sac is identified.     Intrauterine gestation(s): Not visualized     Right ovary: 2.2 x 2.2 x 4.1 cm.  Right ovary appears normal with arterial and venous flow.     Left ovary: Left ovary is not visualized.  5.7 x 3.6 x 2.7 cm anechoic area in the left adnexa.  No identifiable ovarian tissue, gestational sac or fetal pole.     Miscellaneous: Small volume of simple appearing free pelvic fluid.     Impression:     Enlarged uterus with multiple uterine fibroids slightly limiting evaluation.     No intrauterine gestational sac is visualized.  Cystic appearing area in the left adnexa without ovarian tissue, gestational sac or fetal pole.  Overall, these findings are compatible with pregnancy of unknown location which could relate to very early pregnancy, miscarriage or unidentified ectopic pregnancy.  In a hemodynamically stable patient, recommend serial beta HCGs and follow-up with OBGYN.     Small volume of free pelvic  fluid.    Due to pain, patient was admitted for observation. Pain remained stable and ultrasound was repeated. Repeat ultrasound showed:    FINDINGS:  Intrauterine gestation(s): Not identified     Uterus: Enlarged, 15.4 x 9.2 x 17 cm with multiple intramural and subserosal fibroids.  The largest fibroids measure 8.3 x 6.4 x 6.7 cm, 5.6 x 4.8 x 5.3 cm, and 4 x 3.7 x 4.1 cm.  As previously, the fibroids partially obscure the endometrium.  Where the endometrium can be seen, it is not thickened, 12 mm.  No intrauterine gestational sac is identified.     Right ovary: 2.4 x 3.8 x 3 cm with no focal abnormalities.  Arterial and venous flow are identified.     Left ovary: 4.9 x 1.5 x 3.4 cm with a 5 x 3.1 x 3.2 cm cyst, unchanged.  Arterial and venous flow are identified.     Miscellaneous: Small amount of free fluid posterior to the cervix.     Impression:     Pregnancy of unknown location and unknown viability with no identifiable intrauterine gestational sac and differential considerations including early intrauterine pregnancy, miscarriage, and unidentified ectopic pregnancy.  Small amount of free fluid remains.  No significant interval change since November 3, 2023 at 12:11.    As pain was stable, patient was discharged with a repeat hcg today.    Hcg has increased only slightly --- 1415 ---1441. Pain remains unchanged.     Past Medical History:   Diagnosis Date    Fibroids     Hypertension     Migraine     Severe obesity (BMI 35.0-39.9) with comorbidity        Past Surgical History:   Procedure Laterality Date    MYOMECTOMY         OB History    Para Term  AB Living   3       2     SAB IAB Ectopic Multiple Live Births   2              # Outcome Date GA Lbr Padilla/2nd Weight Sex Delivery Anes PTL Lv   3 Current            2 SAB 10/2016           1 SAB 2016               Family History   Problem Relation Age of Onset    Breast cancer Mother 40        Breast Cancer x 2    Cancer Mother     Hypertension  Father     Iron deficiency Sister     No Known Problems Brother     Breast cancer Maternal Grandmother     Colon cancer Neg Hx     Ovarian cancer Neg Hx        Social History     Tobacco Use    Smoking status: Never    Smokeless tobacco: Never   Substance Use Topics    Alcohol use: Yes     Comment: Occasionally    Drug use: No       BP (!) 179/110   Wt 119.5 kg (263 lb 7.2 oz)   LMP 09/16/2023 (Exact Date)   BMI 41.26 kg/m²     ROS:  GENERAL: Denies weight gain or weight loss. Feeling well overall.   SKIN: Denies rash or lesions.   HEAD: Denies head injury or headache.   NODES: Denies enlarged lymph nodes.   CHEST: Denies chest pain or shortness of breath.   CARDIOVASCULAR: Denies palpitations or left sided chest pain.   ABDOMEN: No abdominal pain, constipation, diarrhea, nausea, vomiting or rectal bleeding.   URINARY: No frequency, dysuria, hematuria, or burning on urination.  REPRODUCTIVE: See HPI.   BREASTS: The patient performs breast self-examination and denies pain, lumps, or nipple discharge.   HEMATOLOGIC: No easy bruisability or excessive bleeding.  MUSCULOSKELETAL: Denies joint pain or swelling.   NEUROLOGIC: Denies syncope or weakness.   PSYCHIATRIC: Denies depression, anxiety or mood swings.    Physical Exam:    APPEARANCE: Well nourished, well developed, in no acute distress.  AFFECT: WNL, alert and oriented x 3  SKIN: No acne or hirsutism  NECK: Neck symmetric without masses or thyromegaly  NODES: No inguinal, cervical, axillary, or femoral lymph node enlargement  CHEST: Good respiratory effect  ABDOMEN: Soft.  No tenderness or masses.  No hepatosplenomegaly.  No hernias.  PELVIC: Deferred   EXTREMITIES: No edema.    ASSESSMENT AND PLAN  1. Pregnancy, location unknown          Discussed with patient, hcg did not rise appropriately indicating an abnormal pregnancy though the location is unknown.      Discussed treatment options - expectant management with repeat hcg vs. Medical management vs. D&C and  if chorionic villi is not present, patient to receive methotrexate.     Risks/benefits discussed. Patient desires to proceed with methotrexate only.     Discussed risks/benefits of methotrexate and consent signed. T&S, CBC, LFT's reviewed. Discussed discontinuing folic acid/PNV.    Methotrexate to be given today in infusion center. Hcg on Thursday, and Monday - orders placed. Discussed she may experience an increase in pain as pregnancy is dissolving but if pain is severe, she should return to the ED.    Will trend hcg to pre-pregnancy levels    Follow up in about 4 days (around 11/10/2023).

## 2023-11-08 ENCOUNTER — PATIENT MESSAGE (OUTPATIENT)
Dept: OBSTETRICS AND GYNECOLOGY | Facility: CLINIC | Age: 43
End: 2023-11-08
Payer: COMMERCIAL

## 2023-11-08 NOTE — LETTER
November 9, 2023    Brianne Blas  [address]  [place] [address]         [facility]  [address]  [place]   [place] [address]  Phone: [phone] November 9, 2023     Patient: Brianne Blas   YOB: 1980   Date of Visit: 11/8/2023       To Whom It May Concern:    It is my medical opinion that Brianne Blas  is under my medical supervision. Please excuse her from work until Monday, November 13, 2023 .    If you have any questions or concerns, please don't hesitate to call.    Sincerely,         Sarah Hammonds MD

## 2023-11-09 NOTE — TELEPHONE ENCOUNTER
Spoke with pt  to explain to her that there are two orders in one for her HCG tests. Pt said she will call the  but was unsure that  they were able to see the second order. Pt verbalized understanding

## 2023-11-10 ENCOUNTER — TELEPHONE (OUTPATIENT)
Dept: OBSTETRICS AND GYNECOLOGY | Facility: CLINIC | Age: 43
End: 2023-11-10
Payer: COMMERCIAL

## 2023-11-10 NOTE — TELEPHONE ENCOUNTER
Spoke to pt to let her know that Dr. TRISTAN has uploaded her work excuse letter to her chart and that there is a standing order for her HCG that she just needs to get scheduled. Pt verbalized understanding and had no further questions.

## 2023-11-13 ENCOUNTER — PATIENT MESSAGE (OUTPATIENT)
Dept: OBSTETRICS AND GYNECOLOGY | Facility: CLINIC | Age: 43
End: 2023-11-13
Payer: COMMERCIAL

## 2023-11-13 DIAGNOSIS — O36.80X0 PREGNANCY, LOCATION UNKNOWN: Primary | ICD-10-CM

## 2023-11-16 NOTE — TELEPHONE ENCOUNTER
Called pt to ask her which days work best for her weekly HCG labs. Pt says any day of the week works but prefers early in the morning. Pt verbalized understanding that we will get her set up for weekly labs.

## 2023-11-27 ENCOUNTER — PATIENT MESSAGE (OUTPATIENT)
Dept: OBSTETRICS AND GYNECOLOGY | Facility: CLINIC | Age: 43
End: 2023-11-27
Payer: COMMERCIAL

## 2023-11-27 DIAGNOSIS — N92.6 IRREGULAR MENSTRUAL BLEEDING: Primary | ICD-10-CM

## 2023-12-08 ENCOUNTER — TELEPHONE (OUTPATIENT)
Dept: OBSTETRICS AND GYNECOLOGY | Facility: CLINIC | Age: 43
End: 2023-12-08
Payer: COMMERCIAL

## 2023-12-08 NOTE — TELEPHONE ENCOUNTER
Called pt to let her know I have attached the CBC order to her upcoming HCG lab appt. Pt verbalized understanding

## 2023-12-19 ENCOUNTER — PATIENT MESSAGE (OUTPATIENT)
Dept: OBSTETRICS AND GYNECOLOGY | Facility: CLINIC | Age: 43
End: 2023-12-19
Payer: COMMERCIAL

## 2023-12-19 DIAGNOSIS — D25.1 FIBROIDS, INTRAMURAL: Primary | ICD-10-CM

## 2023-12-21 ENCOUNTER — PATIENT MESSAGE (OUTPATIENT)
Dept: PRIMARY CARE CLINIC | Facility: CLINIC | Age: 43
End: 2023-12-21
Payer: COMMERCIAL

## 2023-12-21 ENCOUNTER — TELEPHONE (OUTPATIENT)
Dept: OBSTETRICS AND GYNECOLOGY | Facility: CLINIC | Age: 43
End: 2023-12-21
Payer: COMMERCIAL

## 2023-12-21 DIAGNOSIS — Z12.31 ENCOUNTER FOR SCREENING MAMMOGRAM FOR MALIGNANT NEOPLASM OF BREAST: Primary | ICD-10-CM

## 2023-12-22 ENCOUNTER — HOSPITAL ENCOUNTER (OUTPATIENT)
Dept: RADIOLOGY | Facility: HOSPITAL | Age: 43
Discharge: HOME | End: 2023-12-22
Attending: OBSTETRICS & GYNECOLOGY
Payer: COMMERCIAL

## 2023-12-22 DIAGNOSIS — D25.1 FIBROIDS, INTRAMURAL: ICD-10-CM

## 2023-12-22 PROCEDURE — A9579 GAD-BASE MR CONTRAST NOS,1ML: HCPCS | Mod: PN | Performed by: OBSTETRICS & GYNECOLOGY

## 2023-12-22 PROCEDURE — 72197 MRI PELVIS W/O & W/DYE: CPT | Mod: 26,,, | Performed by: RADIOLOGY

## 2023-12-22 PROCEDURE — 25500020 PHARM REV CODE 255: Mod: PN | Performed by: OBSTETRICS & GYNECOLOGY

## 2023-12-22 PROCEDURE — 72197 MRI PELVIS W/O & W/DYE: CPT | Mod: TC,PN

## 2023-12-22 PROCEDURE — 72197 MRI PELVIS W WO CONTRAST: ICD-10-PCS | Mod: 26,,, | Performed by: RADIOLOGY

## 2023-12-22 RX ADMIN — GADODIAMIDE 10 ML: 287 INJECTION INTRAVENOUS at 12:12

## 2024-01-05 ENCOUNTER — PATIENT MESSAGE (OUTPATIENT)
Dept: OBSTETRICS AND GYNECOLOGY | Facility: CLINIC | Age: 44
End: 2024-01-05
Payer: COMMERCIAL

## 2024-02-06 ENCOUNTER — PATIENT MESSAGE (OUTPATIENT)
Dept: PRIMARY CARE CLINIC | Facility: CLINIC | Age: 44
End: 2024-02-06
Payer: COMMERCIAL

## 2024-05-22 ENCOUNTER — PATIENT MESSAGE (OUTPATIENT)
Dept: PRIMARY CARE CLINIC | Facility: CLINIC | Age: 44
End: 2024-05-22
Payer: COMMERCIAL

## 2024-05-22 DIAGNOSIS — Z00.00 ANNUAL PHYSICAL EXAM: ICD-10-CM

## 2024-05-22 DIAGNOSIS — E66.01 SEVERE OBESITY (BMI >= 40): ICD-10-CM

## 2024-05-22 DIAGNOSIS — D50.8 OTHER IRON DEFICIENCY ANEMIA: Primary | ICD-10-CM

## 2024-06-18 ENCOUNTER — PATIENT MESSAGE (OUTPATIENT)
Dept: PRIMARY CARE CLINIC | Facility: CLINIC | Age: 44
End: 2024-06-18
Payer: COMMERCIAL

## 2024-06-25 ENCOUNTER — HOSPITAL ENCOUNTER (OUTPATIENT)
Dept: RADIOLOGY | Facility: HOSPITAL | Age: 44
Discharge: HOME | End: 2024-06-25
Attending: INTERNAL MEDICINE
Payer: COMMERCIAL

## 2024-06-25 ENCOUNTER — OFFICE VISIT (OUTPATIENT)
Dept: PRIMARY CARE CLINIC | Facility: CLINIC | Age: 44
End: 2024-06-25
Payer: COMMERCIAL

## 2024-06-25 VITALS
HEIGHT: 67 IN | OXYGEN SATURATION: 100 % | BODY MASS INDEX: 41.32 KG/M2 | SYSTOLIC BLOOD PRESSURE: 124 MMHG | HEART RATE: 68 BPM | DIASTOLIC BLOOD PRESSURE: 76 MMHG | WEIGHT: 263.25 LBS

## 2024-06-25 VITALS — BODY MASS INDEX: 41.28 KG/M2 | WEIGHT: 263 LBS | HEIGHT: 67 IN

## 2024-06-25 DIAGNOSIS — G43.919 REFRACTORY MIGRAINE: ICD-10-CM

## 2024-06-25 DIAGNOSIS — G43.109 MIGRAINE WITH AURA AND WITHOUT STATUS MIGRAINOSUS, NOT INTRACTABLE: ICD-10-CM

## 2024-06-25 DIAGNOSIS — Z12.31 ENCOUNTER FOR SCREENING MAMMOGRAM FOR MALIGNANT NEOPLASM OF BREAST: ICD-10-CM

## 2024-06-25 DIAGNOSIS — D50.8 OTHER IRON DEFICIENCY ANEMIA: ICD-10-CM

## 2024-06-25 DIAGNOSIS — I10 ESSENTIAL HYPERTENSION: ICD-10-CM

## 2024-06-25 DIAGNOSIS — Z00.00 ANNUAL PHYSICAL EXAM: Primary | ICD-10-CM

## 2024-06-25 DIAGNOSIS — N97.9 INFERTILITY, FEMALE: ICD-10-CM

## 2024-06-25 DIAGNOSIS — E66.01 SEVERE OBESITY (BMI >= 40): ICD-10-CM

## 2024-06-25 PROBLEM — O26.899 ABDOMINAL PAIN AFFECTING PREGNANCY: Status: RESOLVED | Noted: 2023-11-03 | Resolved: 2024-06-25

## 2024-06-25 PROBLEM — R10.9 ABDOMINAL PAIN AFFECTING PREGNANCY: Status: RESOLVED | Noted: 2023-11-03 | Resolved: 2024-06-25

## 2024-06-25 PROBLEM — E55.9 VITAMIN D DEFICIENCY: Status: RESOLVED | Noted: 2018-05-29 | Resolved: 2024-06-25

## 2024-06-25 PROCEDURE — 99999 PR PBB SHADOW E&M-EST. PATIENT-LVL III: CPT | Mod: PBBFAC,,, | Performed by: INTERNAL MEDICINE

## 2024-06-25 PROCEDURE — 3074F SYST BP LT 130 MM HG: CPT | Mod: CPTII,S$GLB,, | Performed by: INTERNAL MEDICINE

## 2024-06-25 PROCEDURE — 1159F MED LIST DOCD IN RCRD: CPT | Mod: CPTII,S$GLB,, | Performed by: INTERNAL MEDICINE

## 2024-06-25 PROCEDURE — 99396 PREV VISIT EST AGE 40-64: CPT | Mod: S$GLB,,, | Performed by: INTERNAL MEDICINE

## 2024-06-25 PROCEDURE — 77067 SCR MAMMO BI INCL CAD: CPT | Mod: TC

## 2024-06-25 PROCEDURE — 3008F BODY MASS INDEX DOCD: CPT | Mod: CPTII,S$GLB,, | Performed by: INTERNAL MEDICINE

## 2024-06-25 PROCEDURE — 3078F DIAST BP <80 MM HG: CPT | Mod: CPTII,S$GLB,, | Performed by: INTERNAL MEDICINE

## 2024-06-25 RX ORDER — LABETALOL 200 MG/1
200 TABLET, FILM COATED ORAL 2 TIMES DAILY
Qty: 180 TABLET | Refills: 3 | Status: SHIPPED | OUTPATIENT
Start: 2024-06-25 | End: 2025-06-25

## 2024-06-25 RX ORDER — SUMATRIPTAN SUCCINATE 25 MG/1
25 TABLET ORAL
Qty: 9 TABLET | Refills: 5 | Status: SHIPPED | OUTPATIENT
Start: 2024-06-25 | End: 2024-07-25

## 2024-06-25 RX ORDER — HYDRALAZINE HYDROCHLORIDE 10 MG/1
10 TABLET, FILM COATED ORAL EVERY 12 HOURS
Qty: 180 TABLET | Refills: 3 | Status: SHIPPED | OUTPATIENT
Start: 2024-06-25 | End: 2025-06-25

## 2024-06-25 NOTE — ASSESSMENT & PLAN NOTE
Had ectopic in the fall 2023 after 3 rounds of Clomid. 12/2023 MRI with Dr. Hammonds with fibroids, recommended laparoscopic removal but patient hesitant.

## 2024-06-25 NOTE — PROGRESS NOTES
[facility] Note    Chief Complaint      Chief Complaint   Patient presents with    Annual Exam     History of Present Illness      Brianne Familia Blas is a 44 y.o. female who presents today for Annual preventative visit.  Patient comes to appointment alone.  OBGYN: IVON Tejada Estopinal      Problem List Items Addressed This Visit       Essential hypertension    Current Assessment & Plan     BP controlled on labetalol 200 mg and hydralazine 10 mg BID. BP controlled at home as well. Exericising 3 times per week, weight/pilates/cardio/kathi.  Diet has been good. No CP/SOB.         Relevant Medications    labetaloL (NORMODYNE) 200 MG tablet    hydrALAZINE (APRESOLINE) 10 MG tablet    Infertility, female    Current Assessment & Plan     Had ectopic in the fall 2023 after 3 rounds of Clomid. 12/2023 MRI with Dr. Hammonds with fibroids, recommended laparoscopic removal but patient hesitant.         Iron deficiency anemia    Current Assessment & Plan     Ferritin 10 on 6/2023 labs, not taking iron at present.         Refractory migraine    Current Assessment & Plan     Stable on imitrex PRN.  Has not had a lot of migraines, had one last week during cycle, usually right before cycle.         Severe obesity (BMI >= 40)    Current Assessment & Plan     Weight stable.          Other Visit Diagnoses       Annual physical exam    -  Primary    Migraine with aura and without status migrainosus, not intractable        Relevant Medications    sumatriptan (IMITREX) 25 MG Tab                  Health Maintenance   Topic Date Due    Mammogram  06/21/2024    TETANUS VACCINE  03/05/2027    Hepatitis C Screening  Completed    Lipid Panel  Completed       Past Medical History:   Diagnosis Date    Fibroids     Hypertension     Migraine     Severe obesity (BMI 35.0-39.9) with comorbidity        Past Surgical History:   Procedure Laterality Date    MYOMECTOMY         family history includes Breast cancer in her  maternal grandmother; Breast cancer (age of onset: 40) in her mother; Cancer in her mother; Hypertension in her father; Iron deficiency in her sister; No Known Problems in her brother.    Social History     Tobacco Use    Smoking status: Never    Smokeless tobacco: Never   Substance Use Topics    Alcohol use: Yes     Comment: Occasionally    Drug use: No       Review of Systems   Constitutional:  Negative for chills and fever.   Respiratory:  Negative for cough and shortness of breath.    Cardiovascular:  Negative for chest pain and palpitations.   Gastrointestinal:  Negative for constipation, diarrhea, nausea and vomiting.   Genitourinary:  Negative for dysuria and hematuria.   Musculoskeletal:  Negative for falls.   Neurological:  Negative for headaches.        Outpatient Encounter Medications as of 6/25/2024   Medication Sig Dispense Refill    Lactobacillus rhamnosus GG (CULTURELLE) 10 billion cell capsule Take 1 capsule by mouth once daily.      [DISCONTINUED] hydrALAZINE (APRESOLINE) 10 MG tablet Take 1 tablet (10 mg total) by mouth every 12 (twelve) hours. 180 tablet 3    [DISCONTINUED] labetaloL (NORMODYNE) 200 MG tablet Take 1 tablet (200 mg total) by mouth 2 (two) times daily. 180 tablet 3    [DISCONTINUED] sumatriptan (IMITREX) 25 MG Tab Take 1 tablet (25 mg total) by mouth every 2 (two) hours as needed (migraines, not to exceed 2 doses in 24 hours). every 2 hours (no more than twice a day) 9 tablet 5    cholecalciferol, vitamin D3, (D3-5000 ORAL) Take 1 tablet by mouth once daily.      hydrALAZINE (APRESOLINE) 10 MG tablet Take 1 tablet (10 mg total) by mouth every 12 (twelve) hours. 180 tablet 3    labetaloL (NORMODYNE) 200 MG tablet Take 1 tablet (200 mg total) by mouth 2 (two) times daily. 180 tablet 3    sumatriptan (IMITREX) 25 MG Tab Take 1 tablet (25 mg total) by mouth every 2 (two) hours as needed (migraines, not to exceed 2 doses in 24 hours). every 2 hours (no more than twice a day) 9 tablet 5  "    No facility-administered encounter medications on file as of 6/25/2024.        Review of patient's allergies indicates:   Allergen Reactions    Nifedipine Edema       Physical Exam      Vital Signs  Pulse: 68  SpO2: 100 %  BP: 124/76  Pain Score: 0-No pain  Height and Weight  Height: 5' 7" (170.2 cm)  Weight: 119.4 kg (263 lb 3.7 oz)  BSA (Calculated - sq m): 2.38 sq meters  BMI (Calculated): 41.2  Weight in (lb) to have BMI = 25: 159.3]    Physical Exam  Constitutional:       Appearance: She is well-developed.   HENT:      Head: Normocephalic and atraumatic.   Cardiovascular:      Rate and Rhythm: Normal rate and regular rhythm.      Heart sounds: Normal heart sounds. No murmur heard.  Pulmonary:      Effort: Pulmonary effort is normal. No respiratory distress.      Breath sounds: Normal breath sounds.   Abdominal:      General: There is no distension.      Palpations: Abdomen is soft.      Tenderness: There is no abdominal tenderness. There is no guarding.   Skin:     General: Skin is warm and dry.   Neurological:      Mental Status: She is alert. Mental status is at baseline.   Psychiatric:         Behavior: Behavior normal.          Laboratory:  CBC:  No results for input(s): "WBC", "RBC", "HGB", "HCT", "PLT", "MCV", "MCH", "MCHC" in the last 2160 hours.    CMP:  No results for input(s): "GLU", "CALCIUM", "ALBUMIN", "PROT", "NA", "K", "CO2", "CL", "BUN", "ALKPHOS", "ALT", "AST", "BILITOT" in the last 2160 hours.    Invalid input(s): "CREATININ"    URINALYSIS:  No results for input(s): "COLORU", "CLARITYU", "SPECGRAV", "PHUR", "PROTEINUA", "GLUCOSEU", "BILIRUBINCON", "BLOODU", "WBCU", "RBCU", "BACTERIA", "MUCUS", "NITRITE", "LEUKOCYTESUR", "UROBILINOGEN", "HYALINECASTS" in the last 2160 hours.   LIPIDS:  No results for input(s): "TSH", "HDL", "CHOL", "TRIG", "LDLCALC", "CHOLHDL", "NONHDLCHOL", "TOTALCHOLEST" in the last 2160 hours.    TSH:  No results for input(s): "TSH" in the last 2160 hours.  A1C:  No " "results for input(s): "HGBA1C" in the last 2160 hours.      Radiology:  No results found in the last 30 days.     Assessment/Plan     Brianne Blas is a 44 y.o.female with:    1. Annual physical exam    2. Essential hypertension  - labetaloL (NORMODYNE) 200 MG tablet; Take 1 tablet (200 mg total) by mouth 2 (two) times daily.  Dispense: 180 tablet; Refill: 3  - hydrALAZINE (APRESOLINE) 10 MG tablet; Take 1 tablet (10 mg total) by mouth every 12 (twelve) hours.  Dispense: 180 tablet; Refill: 3    3. Other iron deficiency anemia    4. Severe obesity (BMI >= 40)    5. Refractory migraine    6. Migraine with aura and without status migrainosus, not intractable  - sumatriptan (IMITREX) 25 MG Tab; Take 1 tablet (25 mg total) by mouth every 2 (two) hours as needed (migraines, not to exceed 2 doses in 24 hours). every 2 hours (no more than twice a day)  Dispense: 9 tablet; Refill: 5    7. Infertility, female          -check labs  -Continue current medications and maintain follow up with specialists.    -Follow up in about 1 year (around 6/25/2025) for Annual Visit.       Vashti López MD  [facility]              "

## 2024-06-25 NOTE — ASSESSMENT & PLAN NOTE
BP controlled on labetalol 200 mg and hydralazine 10 mg BID. BP controlled at home as well. Exericising 3 times per week, weight/pilates/cardio/kathi.  Diet has been good. No CP/SOB.

## 2024-06-25 NOTE — ASSESSMENT & PLAN NOTE
Stable on imitrex PRN.  Has not had a lot of migraines, had one last week during cycle, usually right before cycle.

## 2024-07-12 ENCOUNTER — LAB VISIT (OUTPATIENT)
Dept: LAB | Facility: HOSPITAL | Age: 44
End: 2024-07-12
Attending: INTERNAL MEDICINE
Payer: COMMERCIAL

## 2024-07-12 DIAGNOSIS — Z00.00 ANNUAL PHYSICAL EXAM: ICD-10-CM

## 2024-07-12 DIAGNOSIS — E66.01 SEVERE OBESITY (BMI >= 40): ICD-10-CM

## 2024-07-12 DIAGNOSIS — D50.8 OTHER IRON DEFICIENCY ANEMIA: ICD-10-CM

## 2024-07-12 PROCEDURE — 84466 ASSAY OF TRANSFERRIN: CPT | Performed by: INTERNAL MEDICINE

## 2024-07-12 PROCEDURE — 80053 COMPREHEN METABOLIC PANEL: CPT | Performed by: INTERNAL MEDICINE

## 2024-07-12 PROCEDURE — 85025 COMPLETE CBC W/AUTO DIFF WBC: CPT | Performed by: INTERNAL MEDICINE

## 2024-07-12 PROCEDURE — 83036 HEMOGLOBIN GLYCOSYLATED A1C: CPT | Performed by: INTERNAL MEDICINE

## 2024-07-12 PROCEDURE — 82728 ASSAY OF FERRITIN: CPT | Performed by: INTERNAL MEDICINE

## 2024-07-12 PROCEDURE — 80061 LIPID PANEL: CPT | Performed by: INTERNAL MEDICINE

## 2024-07-12 PROCEDURE — 36415 COLL VENOUS BLD VENIPUNCTURE: CPT | Performed by: INTERNAL MEDICINE

## 2024-07-13 LAB
ALBUMIN SERPL BCP-MCNC: 3.7 G/DL (ref 3.5–5.2)
ALP SERPL-CCNC: 58 U/L (ref 55–135)
ALT SERPL W/O P-5'-P-CCNC: 25 U/L (ref 10–44)
ANION GAP SERPL CALC-SCNC: 9 MMOL/L (ref 8–16)
AST SERPL-CCNC: 20 U/L (ref 10–40)
BASOPHILS # BLD AUTO: 0.04 K/UL (ref 0–0.2)
BASOPHILS NFR BLD: 0.7 % (ref 0–1.9)
BILIRUB SERPL-MCNC: 0.5 MG/DL (ref 0.1–1)
BUN SERPL-MCNC: 12 MG/DL (ref 6–20)
CALCIUM SERPL-MCNC: 9.3 MG/DL (ref 8.7–10.5)
CHLORIDE SERPL-SCNC: 108 MMOL/L (ref 95–110)
CHOLEST SERPL-MCNC: 178 MG/DL (ref 120–199)
CHOLEST/HDLC SERPL: 3.8 {RATIO} (ref 2–5)
CO2 SERPL-SCNC: 20 MMOL/L (ref 23–29)
CREAT SERPL-MCNC: 0.9 MG/DL (ref 0.5–1.4)
DIFFERENTIAL METHOD BLD: ABNORMAL
EOSINOPHIL # BLD AUTO: 0.1 K/UL (ref 0–0.5)
EOSINOPHIL NFR BLD: 1.8 % (ref 0–8)
ERYTHROCYTE [DISTWIDTH] IN BLOOD BY AUTOMATED COUNT: 14.3 % (ref 11.5–14.5)
EST. GFR  (NO RACE VARIABLE): >60 ML/MIN/1.73 M^2
ESTIMATED AVG GLUCOSE: 91 MG/DL (ref 68–131)
FERRITIN SERPL-MCNC: 16 NG/ML (ref 20–300)
GLUCOSE SERPL-MCNC: 85 MG/DL (ref 70–110)
HBA1C MFR BLD: 4.8 % (ref 4–5.6)
HCT VFR BLD AUTO: 37.4 % (ref 37–48.5)
HDLC SERPL-MCNC: 47 MG/DL (ref 40–75)
HDLC SERPL: 26.4 % (ref 20–50)
HGB BLD-MCNC: 11.6 G/DL (ref 12–16)
IMM GRANULOCYTES # BLD AUTO: 0.01 K/UL (ref 0–0.04)
IMM GRANULOCYTES NFR BLD AUTO: 0.2 % (ref 0–0.5)
IRON SERPL-MCNC: 67 UG/DL (ref 30–160)
LDLC SERPL CALC-MCNC: 118.4 MG/DL (ref 63–159)
LYMPHOCYTES # BLD AUTO: 1.6 K/UL (ref 1–4.8)
LYMPHOCYTES NFR BLD: 25.8 % (ref 18–48)
MCH RBC QN AUTO: 29.4 PG (ref 27–31)
MCHC RBC AUTO-ENTMCNC: 31 G/DL (ref 32–36)
MCV RBC AUTO: 95 FL (ref 82–98)
MONOCYTES # BLD AUTO: 0.7 K/UL (ref 0.3–1)
MONOCYTES NFR BLD: 11.2 % (ref 4–15)
NEUTROPHILS # BLD AUTO: 3.7 K/UL (ref 1.8–7.7)
NEUTROPHILS NFR BLD: 60.3 % (ref 38–73)
NONHDLC SERPL-MCNC: 131 MG/DL
NRBC BLD-RTO: 0 /100 WBC
PLATELET # BLD AUTO: 278 K/UL (ref 150–450)
PMV BLD AUTO: 10.9 FL (ref 9.2–12.9)
POTASSIUM SERPL-SCNC: 4.4 MMOL/L (ref 3.5–5.1)
PROT SERPL-MCNC: 7.1 G/DL (ref 6–8.4)
RBC # BLD AUTO: 3.95 M/UL (ref 4–5.4)
SATURATED IRON: 15 % (ref 20–50)
SODIUM SERPL-SCNC: 137 MMOL/L (ref 136–145)
TOTAL IRON BINDING CAPACITY: 438 UG/DL (ref 250–450)
TRANSFERRIN SERPL-MCNC: 296 MG/DL (ref 200–375)
TRIGL SERPL-MCNC: 63 MG/DL (ref 30–150)
WBC # BLD AUTO: 6.09 K/UL (ref 3.9–12.7)

## 2024-07-16 DIAGNOSIS — D50.9 IRON DEFICIENCY ANEMIA, UNSPECIFIED IRON DEFICIENCY ANEMIA TYPE: Primary | ICD-10-CM

## 2024-07-16 RX ORDER — FERROUS SULFATE 325(65) MG
325 TABLET, DELAYED RELEASE (ENTERIC COATED) ORAL DAILY
Qty: 90 TABLET | Refills: 1 | Status: SHIPPED | OUTPATIENT
Start: 2024-07-16

## 2024-08-01 ENCOUNTER — PATIENT MESSAGE (OUTPATIENT)
Dept: OBSTETRICS AND GYNECOLOGY | Facility: CLINIC | Age: 44
End: 2024-08-01
Payer: COMMERCIAL

## 2024-08-01 DIAGNOSIS — D25.1 FIBROIDS, INTRAMURAL: Primary | ICD-10-CM

## 2024-08-02 NOTE — TELEPHONE ENCOUNTER
Pt is interesting in moving forward with surgery. Surgery was discussed previously, does she need to be seen for a consult?

## 2024-08-02 NOTE — TELEPHONE ENCOUNTER
Let's get a repeat ultrasound as it has been almost a year since the last one.    When would she like to have surgery? We can book the date and do the consult and preop at the same time.

## 2024-08-10 ENCOUNTER — PATIENT MESSAGE (OUTPATIENT)
Dept: OBSTETRICS AND GYNECOLOGY | Facility: CLINIC | Age: 44
End: 2024-08-10
Payer: COMMERCIAL

## 2024-08-10 DIAGNOSIS — F40.240 CLAUSTROPHOBIA: ICD-10-CM

## 2024-08-12 RX ORDER — ALPRAZOLAM 0.5 MG/1
0.5 TABLET ORAL
Qty: 1 TABLET | Refills: 0 | Status: SHIPPED | OUTPATIENT
Start: 2024-08-12

## 2024-08-17 ENCOUNTER — HOSPITAL ENCOUNTER (OUTPATIENT)
Dept: RADIOLOGY | Facility: HOSPITAL | Age: 44
Discharge: HOME | End: 2024-08-17
Attending: OBSTETRICS & GYNECOLOGY
Payer: COMMERCIAL

## 2024-08-17 DIAGNOSIS — D25.1 FIBROIDS, INTRAMURAL: ICD-10-CM

## 2024-08-17 PROCEDURE — 72197 MRI PELVIS W/O & W/DYE: CPT | Mod: 26,,, | Performed by: RADIOLOGY

## 2024-08-17 PROCEDURE — A9585 GADOBUTROL INJECTION: HCPCS | Performed by: OBSTETRICS & GYNECOLOGY

## 2024-08-17 PROCEDURE — 25500020 PHARM REV CODE 255: Performed by: OBSTETRICS & GYNECOLOGY

## 2024-08-17 PROCEDURE — 72197 MRI PELVIS W/O & W/DYE: CPT | Mod: TC

## 2024-08-17 RX ORDER — GADOBUTROL 604.72 MG/ML
10 INJECTION INTRAVENOUS
Status: COMPLETED | OUTPATIENT
Start: 2024-08-17 | End: 2024-08-17

## 2024-08-17 RX ADMIN — GADOBUTROL 10 ML: 604.72 INJECTION INTRAVENOUS at 08:08

## 2024-08-19 ENCOUNTER — PATIENT MESSAGE (OUTPATIENT)
Dept: OBSTETRICS AND GYNECOLOGY | Facility: CLINIC | Age: 44
End: 2024-08-19
Payer: COMMERCIAL

## 2024-08-27 DIAGNOSIS — D25.1 FIBROIDS, INTRAMURAL: Primary | ICD-10-CM

## 2024-08-27 RX ORDER — PREGABALIN 50 MG/1
50 CAPSULE ORAL
OUTPATIENT
Start: 2024-08-27

## 2024-08-27 RX ORDER — HYDROMORPHONE HYDROCHLORIDE 1 MG/ML
1 INJECTION, SOLUTION INTRAMUSCULAR; INTRAVENOUS; SUBCUTANEOUS EVERY 4 HOURS PRN
OUTPATIENT
Start: 2024-08-27

## 2024-08-27 RX ORDER — SODIUM CHLORIDE, SODIUM LACTATE, POTASSIUM CHLORIDE, CALCIUM CHLORIDE 600; 310; 30; 20 MG/100ML; MG/100ML; MG/100ML; MG/100ML
INJECTION, SOLUTION INTRAVENOUS CONTINUOUS
OUTPATIENT
Start: 2024-08-27

## 2024-08-27 RX ORDER — PROCHLORPERAZINE EDISYLATE 5 MG/ML
5 INJECTION INTRAMUSCULAR; INTRAVENOUS EVERY 6 HOURS PRN
OUTPATIENT
Start: 2024-08-27

## 2024-08-27 RX ORDER — ACETAMINOPHEN 325 MG/1
650 TABLET ORAL EVERY 4 HOURS PRN
OUTPATIENT
Start: 2024-08-27

## 2024-08-27 RX ORDER — HYDROMORPHONE HYDROCHLORIDE 1 MG/ML
0.2 INJECTION, SOLUTION INTRAMUSCULAR; INTRAVENOUS; SUBCUTANEOUS EVERY 5 MIN PRN
OUTPATIENT
Start: 2024-08-27

## 2024-08-27 RX ORDER — PROCHLORPERAZINE EDISYLATE 5 MG/ML
5 INJECTION INTRAMUSCULAR; INTRAVENOUS EVERY 10 MIN PRN
OUTPATIENT
Start: 2024-08-27

## 2024-08-27 RX ORDER — KETOROLAC TROMETHAMINE 30 MG/ML
30 INJECTION, SOLUTION INTRAMUSCULAR; INTRAVENOUS
OUTPATIENT
Start: 2024-08-27 | End: 2024-08-28

## 2024-08-27 RX ORDER — FAMOTIDINE 20 MG/1
20 TABLET, FILM COATED ORAL
OUTPATIENT
Start: 2024-08-27

## 2024-08-27 RX ORDER — POLYETHYLENE GLYCOL 3350 17 G/17G
17 POWDER, FOR SOLUTION ORAL DAILY
OUTPATIENT
Start: 2024-08-28

## 2024-08-27 RX ORDER — DIPHENHYDRAMINE HYDROCHLORIDE 50 MG/ML
25 INJECTION INTRAMUSCULAR; INTRAVENOUS EVERY 4 HOURS PRN
OUTPATIENT
Start: 2024-08-27

## 2024-08-27 RX ORDER — LIDOCAINE HYDROCHLORIDE 10 MG/ML
0.5 INJECTION, SOLUTION EPIDURAL; INFILTRATION; INTRACAUDAL; PERINEURAL
OUTPATIENT
Start: 2024-08-27

## 2024-08-27 RX ORDER — HYDROCODONE BITARTRATE AND ACETAMINOPHEN 5; 325 MG/1; MG/1
1 TABLET ORAL EVERY 4 HOURS PRN
OUTPATIENT
Start: 2024-08-27

## 2024-08-27 RX ORDER — CELECOXIB 100 MG/1
400 CAPSULE ORAL
OUTPATIENT
Start: 2024-08-27

## 2024-08-27 RX ORDER — ONDANSETRON HYDROCHLORIDE 2 MG/ML
4 INJECTION, SOLUTION INTRAVENOUS DAILY PRN
OUTPATIENT
Start: 2024-08-27

## 2024-08-27 RX ORDER — SODIUM CHLORIDE 0.9 % (FLUSH) 0.9 %
3 SYRINGE (ML) INJECTION
OUTPATIENT
Start: 2024-08-27

## 2024-08-27 RX ORDER — CEFAZOLIN SODIUM 2 G/50ML
2 SOLUTION INTRAVENOUS
OUTPATIENT
Start: 2024-08-27

## 2024-08-27 RX ORDER — HYDROCODONE BITARTRATE AND ACETAMINOPHEN 10; 325 MG/1; MG/1
1 TABLET ORAL EVERY 4 HOURS PRN
OUTPATIENT
Start: 2024-08-27

## 2024-08-27 RX ORDER — MUPIROCIN 20 MG/G
OINTMENT TOPICAL
OUTPATIENT
Start: 2024-08-27

## 2024-08-27 RX ORDER — ONDANSETRON 8 MG/1
8 TABLET, ORALLY DISINTEGRATING ORAL EVERY 8 HOURS PRN
OUTPATIENT
Start: 2024-08-27

## 2024-08-27 RX ORDER — AMOXICILLIN 250 MG
1 CAPSULE ORAL 2 TIMES DAILY
OUTPATIENT
Start: 2024-08-27

## 2024-08-27 RX ORDER — ACETAMINOPHEN 500 MG
1000 TABLET ORAL
OUTPATIENT
Start: 2024-08-27

## 2024-08-27 RX ORDER — DIPHENHYDRAMINE HCL 25 MG
25 CAPSULE ORAL EVERY 4 HOURS PRN
OUTPATIENT
Start: 2024-08-27

## 2024-08-27 RX ORDER — MEPERIDINE HYDROCHLORIDE 50 MG/ML
12.5 INJECTION INTRAMUSCULAR; INTRAVENOUS; SUBCUTANEOUS ONCE AS NEEDED
OUTPATIENT
Start: 2024-08-27 | End: 2024-08-28

## 2024-08-27 RX ORDER — IBUPROFEN 400 MG/1
800 TABLET ORAL
OUTPATIENT
Start: 2024-08-28

## 2024-08-29 ENCOUNTER — TELEPHONE (OUTPATIENT)
Dept: PREADMISSION TESTING | Facility: HOSPITAL | Age: 44
End: 2024-08-29
Payer: COMMERCIAL

## 2024-08-29 DIAGNOSIS — I10 ESSENTIAL HYPERTENSION: ICD-10-CM

## 2024-08-29 DIAGNOSIS — Z01.818 PRE-OP EVALUATION: Primary | ICD-10-CM

## 2024-10-11 ENCOUNTER — PATIENT MESSAGE (OUTPATIENT)
Dept: OBSTETRICS AND GYNECOLOGY | Facility: CLINIC | Age: 44
End: 2024-10-11
Payer: COMMERCIAL

## 2024-10-18 ENCOUNTER — TELEPHONE (OUTPATIENT)
Dept: OBSTETRICS AND GYNECOLOGY | Facility: CLINIC | Age: 44
End: 2024-10-18
Payer: COMMERCIAL

## 2024-10-18 NOTE — TELEPHONE ENCOUNTER
AUDITED SURGERY 11/12    Pt had no pre op or post ops. Got pt scheduled for all appts. Pt was concerned about her 6 week being 7 weeks and not being cleared for work. Let her know to send us a message if she needs a letter at 6 weeks. Pt MICAELA

## 2024-10-22 ENCOUNTER — LAB VISIT (OUTPATIENT)
Dept: LAB | Facility: HOSPITAL | Age: 44
End: 2024-10-22
Attending: NURSE PRACTITIONER
Payer: COMMERCIAL

## 2024-10-22 DIAGNOSIS — Z01.818 PRE-OP EVALUATION: ICD-10-CM

## 2024-10-22 DIAGNOSIS — I10 ESSENTIAL HYPERTENSION: ICD-10-CM

## 2024-10-22 LAB
ANION GAP SERPL CALC-SCNC: 9 MMOL/L (ref 8–16)
BUN SERPL-MCNC: 15 MG/DL (ref 6–20)
CALCIUM SERPL-MCNC: 9.4 MG/DL (ref 8.7–10.5)
CHLORIDE SERPL-SCNC: 108 MMOL/L (ref 95–110)
CO2 SERPL-SCNC: 22 MMOL/L (ref 23–29)
CREAT SERPL-MCNC: 0.8 MG/DL (ref 0.5–1.4)
EST. GFR  (NO RACE VARIABLE): >60 ML/MIN/1.73 M^2
GLUCOSE SERPL-MCNC: 91 MG/DL (ref 70–110)
POTASSIUM SERPL-SCNC: 4.2 MMOL/L (ref 3.5–5.1)
SODIUM SERPL-SCNC: 139 MMOL/L (ref 136–145)

## 2024-10-22 PROCEDURE — 80048 BASIC METABOLIC PNL TOTAL CA: CPT | Performed by: NURSE PRACTITIONER

## 2024-10-22 PROCEDURE — 36415 COLL VENOUS BLD VENIPUNCTURE: CPT | Performed by: NURSE PRACTITIONER

## 2024-10-22 PROCEDURE — 93010 ELECTROCARDIOGRAM REPORT: CPT | Mod: ,,, | Performed by: INTERNAL MEDICINE

## 2024-10-22 PROCEDURE — 93005 ELECTROCARDIOGRAM TRACING: CPT

## 2024-10-23 LAB
OHS QRS DURATION: 90 MS
OHS QTC CALCULATION: 419 MS

## 2024-10-29 ENCOUNTER — HOSPITAL ENCOUNTER (OUTPATIENT)
Dept: PREADMISSION TESTING | Facility: HOSPITAL | Age: 44
Discharge: HOME | End: 2024-10-29
Attending: NURSE PRACTITIONER
Payer: COMMERCIAL

## 2024-10-29 ENCOUNTER — ANESTHESIA EVENT (OUTPATIENT)
Dept: SURGERY | Facility: HOSPITAL | Age: 44
End: 2024-10-29
Payer: COMMERCIAL

## 2024-10-29 ENCOUNTER — PATIENT MESSAGE (OUTPATIENT)
Dept: PREADMISSION TESTING | Facility: HOSPITAL | Age: 44
End: 2024-10-29

## 2024-10-29 NOTE — ANESTHESIA PREPROCEDURE EVALUATION
10/29/2024  Brianne Familia Blas is a 44 y.o., female scheduled for XI ROBOTIC MYOMECTOMY, UTERUS (Abdomen) 11/12/24.    Past Medical History:   Diagnosis Date    Fibroids     Hypertension     Migraine     Severe obesity (BMI 35.0-39.9) with comorbidity      Past Surgical History:   Procedure Laterality Date    MYOMECTOMY       Review of patient's allergies indicates:   Allergen Reactions    Nifedipine Edema     Current Outpatient Medications   Medication Instructions    ALPRAZolam (XANAX) 0.5 mg, Oral, On Call Procedure    cholecalciferol, vitamin D3, (D3-5000 ORAL) 1 tablet, Oral, Daily    ferrous sulfate 325 mg, Oral, Daily    hydrALAZINE (APRESOLINE) 10 mg, Oral, Every 12 hours    labetaloL (NORMODYNE) 200 mg, Oral, 2 times daily    Lactobacillus rhamnosus GG (CULTURELLE) 10 billion cell capsule 1 capsule, Oral, Daily    sumatriptan (IMITREX) 25 mg, Oral, Every 2 hours PRN, every 2 hours (no more than twice a day)       Pre-op Assessment    I have reviewed the Patient Summary Reports.     I have reviewed the Nursing Notes. I have reviewed the NPO Status.   I have reviewed the Medications.     Review of Systems  Anesthesia Hx:  No problems with previous Anesthesia               Denies Personal Hx of Anesthesia complications.                    Social:  Non-Smoker, Social Alcohol Use       Hematology/Oncology:       -- Anemia:                                  Cardiovascular:  Exercise tolerance: good   Hypertension, well controlled       Denies Angina.                                    Pulmonary:  Pulmonary Normal      Denies Shortness of breath.                  Renal/:  Renal/ Normal                 Hepatic/GI:  Hepatic/GI Normal        Not Taking GLP-1 Agonists            Musculoskeletal:  Musculoskeletal Normal                OB/GYN/PEDS:  Uterine fibroids           Neurological:  Denies TIA.   Denies CVA.   Headaches Denies Seizures.                                Endocrine:  Endocrine Normal          Morbid Obesity / BMI > 40  Psych:  Psychiatric Normal                    Physical Exam  General: Cooperative, Oriented, Alert and Well nourished    Airway:  Mallampati: II   Mouth Opening: Normal  TM Distance: Normal  Tongue: Normal  Neck ROM: Normal ROM    Dental:  Intact    Chest/Lungs:  Clear to auscultation, Normal Respiratory Rate    Heart:  Rate: Normal  Rhythm: Regular Rhythm      Lab Results   Component Value Date    WBC 6.09 07/12/2024    HGB 11.6 (L) 07/12/2024    HCT 37.4 07/12/2024     07/12/2024    CHOL 178 07/12/2024    TRIG 63 07/12/2024    HDL 47 07/12/2024    ALT 25 07/12/2024    AST 20 07/12/2024     10/22/2024    K 4.2 10/22/2024     10/22/2024    CREATININE 0.8 10/22/2024    BUN 15 10/22/2024    CO2 22 (L) 10/22/2024    TSH 1.110 04/09/2021    INR 1.1 01/26/2023    HGBA1C 4.8 07/12/2024     Results for orders placed or performed in visit on 10/22/24   EKG 12-lead    Collection Time: 10/22/24  6:18 AM   Result Value Ref Range    QRS Duration 90 ms    [facility] QTC Calculation 419 ms    Narrative    Test Reason : Z01.818,I10,    Vent. Rate : 070 BPM     Atrial Rate : 070 BPM     P-R Int : 168 ms          QRS Dur : 090 ms      QT Int : 388 ms       P-R-T Axes : 051 053 085 degrees     QTc Int : 419 ms    Normal sinus rhythm  Normal ECG  When compared with ECG of 08-MAR-2018 12:20,  No significant change was found  Confirmed by Makayla Courtney MD ([id number]) on 10/23/2024 8:10:32 AM    Referred By: MICAELA WRIGHT           Confirmed By:Makayla Courtney MD         Anesthesia Plan  Type of Anesthesia, risks & benefits discussed:    Anesthesia Type: Gen ETT  Intra-op Monitoring Plan: Standard ASA Monitors  Post Op Pain Control Plan: multimodal analgesia  Induction:  IV  Airway Plan: Video and Direct, Post-Induction  Informed Consent: Informed consent signed with the Patient  and all parties understand the risks and agree with anesthesia plan.  All questions answered.   ASA Score: 2  Anesthesia Plan Notes:       Ready For Surgery From Anesthesia Perspective.     .

## 2024-10-31 ENCOUNTER — OFFICE VISIT (OUTPATIENT)
Dept: OBSTETRICS AND GYNECOLOGY | Facility: CLINIC | Age: 44
End: 2024-10-31
Payer: COMMERCIAL

## 2024-10-31 VITALS
SYSTOLIC BLOOD PRESSURE: 152 MMHG | DIASTOLIC BLOOD PRESSURE: 115 MMHG | BODY MASS INDEX: 40.78 KG/M2 | WEIGHT: 260.38 LBS

## 2024-10-31 DIAGNOSIS — D25.1 FIBROIDS, INTRAMURAL: Primary | ICD-10-CM

## 2024-10-31 DIAGNOSIS — Z01.818 PREOPERATIVE EXAM FOR GYNECOLOGIC SURGERY: ICD-10-CM

## 2024-10-31 PROCEDURE — 1160F RVW MEDS BY RX/DR IN RCRD: CPT | Mod: CPTII,S$GLB,, | Performed by: OBSTETRICS & GYNECOLOGY

## 2024-10-31 PROCEDURE — 3008F BODY MASS INDEX DOCD: CPT | Mod: CPTII,S$GLB,, | Performed by: OBSTETRICS & GYNECOLOGY

## 2024-10-31 PROCEDURE — 99999 PR PBB SHADOW E&M-EST. PATIENT-LVL III: CPT | Mod: PBBFAC,,, | Performed by: OBSTETRICS & GYNECOLOGY

## 2024-10-31 PROCEDURE — 1159F MED LIST DOCD IN RCRD: CPT | Mod: CPTII,S$GLB,, | Performed by: OBSTETRICS & GYNECOLOGY

## 2024-10-31 PROCEDURE — 3044F HG A1C LEVEL LT 7.0%: CPT | Mod: CPTII,S$GLB,, | Performed by: OBSTETRICS & GYNECOLOGY

## 2024-10-31 PROCEDURE — 99214 OFFICE O/P EST MOD 30 MIN: CPT | Mod: 57,S$GLB,, | Performed by: OBSTETRICS & GYNECOLOGY

## 2024-10-31 PROCEDURE — 3080F DIAST BP >= 90 MM HG: CPT | Mod: CPTII,S$GLB,, | Performed by: OBSTETRICS & GYNECOLOGY

## 2024-10-31 PROCEDURE — 3077F SYST BP >= 140 MM HG: CPT | Mod: CPTII,S$GLB,, | Performed by: OBSTETRICS & GYNECOLOGY

## 2024-10-31 NOTE — H&P (VIEW-ONLY)
CC: Symptoms related to fibroids    Brianne Familia Blas is a 44 y.o. female  presents for a consultation for management of fibroids.      She reports cycles are regular and not painful. BP elevated but denies headaches or blurry vision. Reports BPs at home are within normal limits.     She is interested in future fertility. She conceived in . Pregnancy had a presumed ectopic pregnancy. She is interested in having fibroids removed prior to conceiving again.     MRI shows:    FINDINGS:  The uterus measures 7.7 x 9.8 x 15.4 cm.  Multiple enhanced intramural, submucosal and subserosal fibroids, similar when compared to the previous MRI.  Largest fibroid appears intramural/subserosal and is located at the left posterolateral aspect of the fundus/body.  Endometrial cavity demonstrates no significant abnormalities.  Junctional zone is normal.  Cervical stroma demonstrates preserved signal intensity noting a few scattered benign nabothian cysts.  Vagina appears normal.     New 4.2 x 4.1 x 3.6 cm left adnexal cystic lesion with associated T1 signal hyperintensity, likely a hemorrhagic cyst.  Increased size of a left paraovarian cyst now measuring 3.4 x 4.2 x 5.2 cm (previously 2.3 x 4.4 x 5.2 cm).     Right ovary appears within normal limits.  Small volume of pelvic free fluid, likely physiologic.     Visualized bladder and distal ureters appear within normal limits.  Limited evaluation of the visualized small and large bowel demonstrates no significant abnormalities.  Visualized vascular structures appear within normal limits.  No abdominal or pelvic lymphadenopathy.     Subcutaneous soft tissues appear within normal limits.     No marrow signal abnormality to suggest an infiltrative process.  Posterior central, cranially extending disc extrusion at L5-S1 causes mass effect on the ventral thecal sac and lateral recesses and closely approximates the bilateral descending S1 nerve roots.     Impression:      1. Enlarged uterus with multiple enhancing fibroids, similar when compared to the previous MRI.  2. New 4.2 x 4.1 x 3.6 cm left adnexal hemorrhagic cyst.  Consider follow-up ultrasound in 6-12 weeks.  3. Slight increased size of a 3.4 x 4.2 x 5.2 cm left paraovarian cyst.        Past Medical History:   Diagnosis Date    Fibroids     Hypertension     Migraine     Severe obesity (BMI 35.0-39.9) with comorbidity        Past Surgical History:   Procedure Laterality Date    MYOMECTOMY         Family History   Problem Relation Name Age of Onset    Breast cancer Mother Mom 40        Breast Cancer x 2    Cancer Mother Mom     Hypertension Father Dad     Iron deficiency Sister      No Known Problems Brother      Breast cancer Maternal Grandmother      Colon cancer Neg Hx      Ovarian cancer Neg Hx         Social History     Tobacco Use    Smoking status: Never    Smokeless tobacco: Never   Substance Use Topics    Alcohol use: Yes     Comment: Occasionally    Drug use: No       OB History    Para Term  AB Living   3       2     SAB IAB Ectopic Multiple Live Births   2              # Outcome Date GA Lbr Padilla/2nd Weight Sex Type Anes PTL Lv   3             2 SAB 10/2016           1 2016               BP (!) 152/115 (Patient Position: Sitting)   Wt 118.1 kg (260 lb 5.8 oz)   LMP 10/15/2024 (Exact Date)   BMI 40.78 kg/m²     ROS:  GENERAL: Denies weight gain or weight loss. Feeling well overall.   SKIN: Denies rash or lesions.   HEAD: Denies head injury or headache.   NODES: Denies enlarged lymph nodes.   CHEST: Denies chest pain or shortness of breath.   CARDIOVASCULAR: Denies palpitations or left sided chest pain.   ABDOMEN: No abdominal pain, constipation, diarrhea, nausea, vomiting or rectal bleeding.   URINARY: No frequency, dysuria, hematuria, or burning on urination.  REPRODUCTIVE: See HPI.   BREASTS: The patient performs breast self-examination and denies pain, lumps, or nipple discharge.    HEMATOLOGIC: No easy bruisability or excessive bleeding with the exception of menstrual cycles.  MUSCULOSKELETAL: Denies joint pain or swelling.   NEUROLOGIC: Denies syncope or weakness.   PSYCHIATRIC: Denies depression, anxiety or mood swings.    PHYSICAL EXAM:  APPEARANCE: Well nourished, well developed, in no acute distress.  AFFECT: WNL, alert and oriented x 3  SKIN: No acne or hirsutism  NECK: Neck symmetric without masses or thyromegaly  NODES: No inguinal, cervical, axillary, or femoral lymph node enlargement  CHEST: Good respiratory effect  ABDOMEN: Soft.  No tenderness or masses.  No hepatosplenomegaly.  No hernias.  PELVIC: Deferred    EXTREMITIES: No edema.      ICD-10-CM ICD-9-CM    1. Fibroids, intramural  D25.1 218.1       2. Preoperative exam for gynecologic surgery  Z01.818 V72.83 TYPE AND SCREEN PREOP      CBC auto differential            Patient was counseled today on treatment options for fibroids. In review of her MRI, she has fibroids impacting the cavity. Recommended removing prior to conceiving. She is a canddiate for a DVM. Discussed the risk of recurrence of fibroids, the need to delay conception.    Surgical risks discussed including but not limited to risk of bleeding, infection, injury to adjacent organs, laparotomy, and power morcellation.      I have discussed the risks, benefits, indications, and alternatives of the procedure in detail.  The patient verbalizes her understanding.  All questions answered.  Consents signed.  The patient agrees to proceed to proceed as planned.

## 2024-11-04 ENCOUNTER — PATIENT MESSAGE (OUTPATIENT)
Dept: OBSTETRICS AND GYNECOLOGY | Facility: CLINIC | Age: 44
End: 2024-11-04
Payer: COMMERCIAL

## 2024-11-06 ENCOUNTER — LAB VISIT (OUTPATIENT)
Dept: LAB | Facility: HOSPITAL | Age: 44
End: 2024-11-06
Attending: INTERNAL MEDICINE
Payer: COMMERCIAL

## 2024-11-06 DIAGNOSIS — Z01.818 PREOPERATIVE EXAM FOR GYNECOLOGIC SURGERY: ICD-10-CM

## 2024-11-06 LAB
ABO + RH BLD: NORMAL
BASOPHILS # BLD AUTO: 0.03 K/UL (ref 0–0.2)
BASOPHILS NFR BLD: 0.5 % (ref 0–1.9)
BLD GP AB SCN CELLS X3 SERPL QL: NORMAL
DIFFERENTIAL METHOD BLD: ABNORMAL
EOSINOPHIL # BLD AUTO: 0.1 K/UL (ref 0–0.5)
EOSINOPHIL NFR BLD: 1.4 % (ref 0–8)
ERYTHROCYTE [DISTWIDTH] IN BLOOD BY AUTOMATED COUNT: 14.5 % (ref 11.5–14.5)
HCT VFR BLD AUTO: 34.6 % (ref 37–48.5)
HGB BLD-MCNC: 11.2 G/DL (ref 12–16)
IMM GRANULOCYTES # BLD AUTO: 0.01 K/UL (ref 0–0.04)
IMM GRANULOCYTES NFR BLD AUTO: 0.2 % (ref 0–0.5)
LYMPHOCYTES # BLD AUTO: 1.4 K/UL (ref 1–4.8)
LYMPHOCYTES NFR BLD: 24.5 % (ref 18–48)
MCH RBC QN AUTO: 29.5 PG (ref 27–31)
MCHC RBC AUTO-ENTMCNC: 32.4 G/DL (ref 32–36)
MCV RBC AUTO: 91 FL (ref 82–98)
MONOCYTES # BLD AUTO: 0.6 K/UL (ref 0.3–1)
MONOCYTES NFR BLD: 10.1 % (ref 4–15)
NEUTROPHILS # BLD AUTO: 3.6 K/UL (ref 1.8–7.7)
NEUTROPHILS NFR BLD: 63.3 % (ref 38–73)
NRBC BLD-RTO: 0 /100 WBC
PLATELET # BLD AUTO: 234 K/UL (ref 150–450)
PMV BLD AUTO: 10.4 FL (ref 9.2–12.9)
RBC # BLD AUTO: 3.8 M/UL (ref 4–5.4)
WBC # BLD AUTO: 5.63 K/UL (ref 3.9–12.7)

## 2024-11-06 PROCEDURE — 86900 BLOOD TYPING SEROLOGIC ABO: CPT | Performed by: OBSTETRICS & GYNECOLOGY

## 2024-11-06 PROCEDURE — 85025 COMPLETE CBC W/AUTO DIFF WBC: CPT | Performed by: OBSTETRICS & GYNECOLOGY

## 2024-11-06 PROCEDURE — 36415 COLL VENOUS BLD VENIPUNCTURE: CPT | Performed by: OBSTETRICS & GYNECOLOGY

## 2024-11-09 ENCOUNTER — PATIENT MESSAGE (OUTPATIENT)
Dept: OBSTETRICS AND GYNECOLOGY | Facility: CLINIC | Age: 44
End: 2024-11-09
Payer: COMMERCIAL

## 2024-11-12 ENCOUNTER — HOSPITAL ENCOUNTER (OUTPATIENT)
Facility: HOSPITAL | Age: 44
Discharge: HOME | End: 2024-11-12
Attending: OBSTETRICS & GYNECOLOGY | Admitting: OBSTETRICS & GYNECOLOGY
Payer: COMMERCIAL

## 2024-11-12 ENCOUNTER — ANESTHESIA (OUTPATIENT)
Dept: SURGERY | Facility: HOSPITAL | Age: 44
End: 2024-11-12
Payer: COMMERCIAL

## 2024-11-12 ENCOUNTER — PATIENT MESSAGE (OUTPATIENT)
Dept: SURGERY | Facility: HOSPITAL | Age: 44
End: 2024-11-12
Payer: COMMERCIAL

## 2024-11-12 VITALS
HEART RATE: 62 BPM | OXYGEN SATURATION: 96 % | DIASTOLIC BLOOD PRESSURE: 76 MMHG | RESPIRATION RATE: 14 BRPM | WEIGHT: 250 LBS | TEMPERATURE: 98 F | BODY MASS INDEX: 39.24 KG/M2 | SYSTOLIC BLOOD PRESSURE: 137 MMHG | HEIGHT: 67 IN

## 2024-11-12 DIAGNOSIS — D25.1 FIBROIDS, INTRAMURAL: ICD-10-CM

## 2024-11-12 DIAGNOSIS — Z98.890 S/P MYOMECTOMY: Primary | ICD-10-CM

## 2024-11-12 LAB
ABO GROUP BLD: NORMAL
B-HCG UR QL: NEGATIVE
BLD GP AB SCN CELLS X3 SERPL QL: NORMAL
CTP QC/QA: YES
RH BLD: NORMAL

## 2024-11-12 PROCEDURE — 27201423 OPTIME MED/SURG SUP & DEVICES STERILE SUPPLY: Performed by: OBSTETRICS & GYNECOLOGY

## 2024-11-12 PROCEDURE — 36000710: Performed by: OBSTETRICS & GYNECOLOGY

## 2024-11-12 PROCEDURE — 81025 URINE PREGNANCY TEST: CPT | Performed by: OBSTETRICS & GYNECOLOGY

## 2024-11-12 PROCEDURE — 25000003 PHARM REV CODE 250: Performed by: ANESTHESIOLOGY

## 2024-11-12 PROCEDURE — 36415 COLL VENOUS BLD VENIPUNCTURE: CPT | Performed by: OBSTETRICS & GYNECOLOGY

## 2024-11-12 PROCEDURE — 25000003 PHARM REV CODE 250: Performed by: OBSTETRICS & GYNECOLOGY

## 2024-11-12 PROCEDURE — 71000016 HC POSTOP RECOV ADDL HR: Performed by: OBSTETRICS & GYNECOLOGY

## 2024-11-12 PROCEDURE — 71000033 HC RECOVERY, INTIAL HOUR: Performed by: OBSTETRICS & GYNECOLOGY

## 2024-11-12 PROCEDURE — 25000003 PHARM REV CODE 250

## 2024-11-12 PROCEDURE — 37000009 HC ANESTHESIA EA ADD 15 MINS: Performed by: OBSTETRICS & GYNECOLOGY

## 2024-11-12 PROCEDURE — 58546 LAPARO-MYOMECTOMY COMPLEX: CPT | Mod: 22,,, | Performed by: OBSTETRICS & GYNECOLOGY

## 2024-11-12 PROCEDURE — 63600175 PHARM REV CODE 636 W HCPCS

## 2024-11-12 PROCEDURE — 86900 BLOOD TYPING SEROLOGIC ABO: CPT | Performed by: OBSTETRICS & GYNECOLOGY

## 2024-11-12 PROCEDURE — P9045 ALBUMIN (HUMAN), 5%, 250 ML: HCPCS | Mod: JZ,JG

## 2024-11-12 PROCEDURE — 63600175 PHARM REV CODE 636 W HCPCS: Performed by: OBSTETRICS & GYNECOLOGY

## 2024-11-12 PROCEDURE — 71000039 HC RECOVERY, EACH ADD'L HOUR: Performed by: OBSTETRICS & GYNECOLOGY

## 2024-11-12 PROCEDURE — 88305 TISSUE EXAM BY PATHOLOGIST: CPT | Performed by: PATHOLOGY

## 2024-11-12 PROCEDURE — 71000015 HC POSTOP RECOV 1ST HR: Performed by: OBSTETRICS & GYNECOLOGY

## 2024-11-12 PROCEDURE — 58546 LAPARO-MYOMECTOMY COMPLEX: CPT | Mod: AS,22,,

## 2024-11-12 PROCEDURE — C1782 MORCELLATOR: HCPCS | Performed by: OBSTETRICS & GYNECOLOGY

## 2024-11-12 PROCEDURE — 37000008 HC ANESTHESIA 1ST 15 MINUTES: Performed by: OBSTETRICS & GYNECOLOGY

## 2024-11-12 PROCEDURE — 36000711: Performed by: OBSTETRICS & GYNECOLOGY

## 2024-11-12 RX ORDER — SUCCINYLCHOLINE CHLORIDE 20 MG/ML
INJECTION INTRAMUSCULAR; INTRAVENOUS
Status: DISCONTINUED | OUTPATIENT
Start: 2024-11-12 | End: 2024-11-12

## 2024-11-12 RX ORDER — ONDANSETRON HYDROCHLORIDE 2 MG/ML
4 INJECTION, SOLUTION INTRAVENOUS DAILY PRN
Status: DISCONTINUED | OUTPATIENT
Start: 2024-11-12 | End: 2024-11-12 | Stop reason: HOSPADM

## 2024-11-12 RX ORDER — CELECOXIB 100 MG/1
400 CAPSULE ORAL
Status: COMPLETED | OUTPATIENT
Start: 2024-11-12 | End: 2024-11-12

## 2024-11-12 RX ORDER — CEFAZOLIN SODIUM 1 G/3ML
INJECTION, POWDER, FOR SOLUTION INTRAMUSCULAR; INTRAVENOUS
Status: DISCONTINUED | OUTPATIENT
Start: 2024-11-12 | End: 2024-11-12

## 2024-11-12 RX ORDER — ONDANSETRON 8 MG/1
8 TABLET, ORALLY DISINTEGRATING ORAL EVERY 8 HOURS PRN
Status: DISCONTINUED | OUTPATIENT
Start: 2024-11-12 | End: 2024-11-12 | Stop reason: HOSPADM

## 2024-11-12 RX ORDER — IBUPROFEN 400 MG/1
800 TABLET ORAL
Status: DISCONTINUED | OUTPATIENT
Start: 2024-11-13 | End: 2024-11-12 | Stop reason: HOSPADM

## 2024-11-12 RX ORDER — DIPHENHYDRAMINE HCL 25 MG
25 CAPSULE ORAL EVERY 4 HOURS PRN
Status: DISCONTINUED | OUTPATIENT
Start: 2024-11-12 | End: 2024-11-12 | Stop reason: HOSPADM

## 2024-11-12 RX ORDER — PROCHLORPERAZINE EDISYLATE 5 MG/ML
5 INJECTION INTRAMUSCULAR; INTRAVENOUS EVERY 10 MIN PRN
Status: DISCONTINUED | OUTPATIENT
Start: 2024-11-12 | End: 2024-11-12 | Stop reason: HOSPADM

## 2024-11-12 RX ORDER — ROCURONIUM BROMIDE 10 MG/ML
INJECTION, SOLUTION INTRAVENOUS
Status: DISCONTINUED | OUTPATIENT
Start: 2024-11-12 | End: 2024-11-12

## 2024-11-12 RX ORDER — FAMOTIDINE 20 MG/1
20 TABLET, FILM COATED ORAL
Status: COMPLETED | OUTPATIENT
Start: 2024-11-12 | End: 2024-11-12

## 2024-11-12 RX ORDER — LIDOCAINE HYDROCHLORIDE 20 MG/ML
INJECTION INTRAVENOUS
Status: DISCONTINUED | OUTPATIENT
Start: 2024-11-12 | End: 2024-11-12

## 2024-11-12 RX ORDER — LIDOCAINE HYDROCHLORIDE 10 MG/ML
0.5 INJECTION, SOLUTION EPIDURAL; INFILTRATION; INTRACAUDAL; PERINEURAL
Status: DISCONTINUED | OUTPATIENT
Start: 2024-11-12 | End: 2024-11-12 | Stop reason: HOSPADM

## 2024-11-12 RX ORDER — CEFAZOLIN 2 G/1
2 INJECTION, POWDER, FOR SOLUTION INTRAMUSCULAR; INTRAVENOUS
Status: DISCONTINUED | OUTPATIENT
Start: 2024-11-12 | End: 2024-11-12 | Stop reason: HOSPADM

## 2024-11-12 RX ORDER — KETOROLAC TROMETHAMINE 30 MG/ML
INJECTION, SOLUTION INTRAMUSCULAR; INTRAVENOUS
Status: DISCONTINUED | OUTPATIENT
Start: 2024-11-12 | End: 2024-11-12

## 2024-11-12 RX ORDER — EPHEDRINE SULFATE 50 MG/ML
INJECTION, SOLUTION INTRAVENOUS
Status: DISCONTINUED | OUTPATIENT
Start: 2024-11-12 | End: 2024-11-12

## 2024-11-12 RX ORDER — PREGABALIN 50 MG/1
50 CAPSULE ORAL
Status: COMPLETED | OUTPATIENT
Start: 2024-11-12 | End: 2024-11-12

## 2024-11-12 RX ORDER — HYDROCODONE BITARTRATE AND ACETAMINOPHEN 5; 325 MG/1; MG/1
1 TABLET ORAL EVERY 4 HOURS PRN
Status: DISCONTINUED | OUTPATIENT
Start: 2024-11-12 | End: 2024-11-12 | Stop reason: HOSPADM

## 2024-11-12 RX ORDER — SODIUM CHLORIDE, SODIUM LACTATE, POTASSIUM CHLORIDE, CALCIUM CHLORIDE 600; 310; 30; 20 MG/100ML; MG/100ML; MG/100ML; MG/100ML
INJECTION, SOLUTION INTRAVENOUS CONTINUOUS
Status: DISCONTINUED | OUTPATIENT
Start: 2024-11-12 | End: 2024-11-12 | Stop reason: HOSPADM

## 2024-11-12 RX ORDER — ACETAMINOPHEN 500 MG
1000 TABLET ORAL
Status: COMPLETED | OUTPATIENT
Start: 2024-11-12 | End: 2024-11-12

## 2024-11-12 RX ORDER — ALBUMIN HUMAN 50 G/1000ML
SOLUTION INTRAVENOUS
Status: DISCONTINUED | OUTPATIENT
Start: 2024-11-12 | End: 2024-11-12

## 2024-11-12 RX ORDER — PROCHLORPERAZINE EDISYLATE 5 MG/ML
5 INJECTION INTRAMUSCULAR; INTRAVENOUS EVERY 6 HOURS PRN
Status: DISCONTINUED | OUTPATIENT
Start: 2024-11-12 | End: 2024-11-12 | Stop reason: HOSPADM

## 2024-11-12 RX ORDER — FENTANYL CITRATE 50 UG/ML
INJECTION, SOLUTION INTRAMUSCULAR; INTRAVENOUS
Status: DISCONTINUED | OUTPATIENT
Start: 2024-11-12 | End: 2024-11-12

## 2024-11-12 RX ORDER — HYDROCODONE BITARTRATE AND ACETAMINOPHEN 5; 325 MG/1; MG/1
1 TABLET ORAL EVERY 6 HOURS PRN
Qty: 12 TABLET | Refills: 0 | Status: SHIPPED | OUTPATIENT
Start: 2024-11-12

## 2024-11-12 RX ORDER — SODIUM CHLORIDE 0.9 % (FLUSH) 0.9 %
3 SYRINGE (ML) INJECTION
Status: DISCONTINUED | OUTPATIENT
Start: 2024-11-12 | End: 2024-11-12 | Stop reason: HOSPADM

## 2024-11-12 RX ORDER — VASOPRESSIN 20 [USP'U]/ML
INJECTION, SOLUTION INTRAMUSCULAR; SUBCUTANEOUS
Status: DISCONTINUED | OUTPATIENT
Start: 2024-11-12 | End: 2024-11-12 | Stop reason: HOSPADM

## 2024-11-12 RX ORDER — POLYETHYLENE GLYCOL 3350 17 G/17G
17 POWDER, FOR SOLUTION ORAL DAILY
Status: DISCONTINUED | OUTPATIENT
Start: 2024-11-12 | End: 2024-11-12 | Stop reason: HOSPADM

## 2024-11-12 RX ORDER — AMOXICILLIN 250 MG
1 CAPSULE ORAL 2 TIMES DAILY
Status: DISCONTINUED | OUTPATIENT
Start: 2024-11-12 | End: 2024-11-12 | Stop reason: HOSPADM

## 2024-11-12 RX ORDER — MEPERIDINE HYDROCHLORIDE 50 MG/ML
12.5 INJECTION INTRAMUSCULAR; INTRAVENOUS; SUBCUTANEOUS ONCE AS NEEDED
Status: DISCONTINUED | OUTPATIENT
Start: 2024-11-12 | End: 2024-11-12 | Stop reason: HOSPADM

## 2024-11-12 RX ORDER — KETOROLAC TROMETHAMINE 30 MG/ML
30 INJECTION, SOLUTION INTRAMUSCULAR; INTRAVENOUS
Status: DISCONTINUED | OUTPATIENT
Start: 2024-11-12 | End: 2024-11-12 | Stop reason: HOSPADM

## 2024-11-12 RX ORDER — ONDANSETRON HYDROCHLORIDE 2 MG/ML
INJECTION, SOLUTION INTRAVENOUS
Status: DISCONTINUED | OUTPATIENT
Start: 2024-11-12 | End: 2024-11-12

## 2024-11-12 RX ORDER — HYDROMORPHONE HYDROCHLORIDE 2 MG/ML
INJECTION, SOLUTION INTRAMUSCULAR; INTRAVENOUS; SUBCUTANEOUS
Status: DISCONTINUED | OUTPATIENT
Start: 2024-11-12 | End: 2024-11-12

## 2024-11-12 RX ORDER — MIDAZOLAM HYDROCHLORIDE 1 MG/ML
INJECTION INTRAMUSCULAR; INTRAVENOUS
Status: DISCONTINUED | OUTPATIENT
Start: 2024-11-12 | End: 2024-11-12

## 2024-11-12 RX ORDER — PHENYLEPHRINE HYDROCHLORIDE 10 MG/ML
INJECTION INTRAVENOUS
Status: DISCONTINUED | OUTPATIENT
Start: 2024-11-12 | End: 2024-11-12

## 2024-11-12 RX ORDER — ACETAMINOPHEN 325 MG/1
650 TABLET ORAL EVERY 4 HOURS PRN
Status: DISCONTINUED | OUTPATIENT
Start: 2024-11-12 | End: 2024-11-12 | Stop reason: HOSPADM

## 2024-11-12 RX ORDER — IBUPROFEN 800 MG/1
800 TABLET ORAL EVERY 8 HOURS PRN
Qty: 30 TABLET | Refills: 0 | Status: SHIPPED | OUTPATIENT
Start: 2024-11-12

## 2024-11-12 RX ORDER — LIDOCAINE HYDROCHLORIDE 10 MG/ML
1 INJECTION, SOLUTION EPIDURAL; INFILTRATION; INTRACAUDAL; PERINEURAL ONCE
Status: DISCONTINUED | OUTPATIENT
Start: 2024-11-12 | End: 2024-11-12 | Stop reason: HOSPADM

## 2024-11-12 RX ORDER — HYDROMORPHONE HYDROCHLORIDE 2 MG/ML
1 INJECTION, SOLUTION INTRAMUSCULAR; INTRAVENOUS; SUBCUTANEOUS EVERY 4 HOURS PRN
Status: DISCONTINUED | OUTPATIENT
Start: 2024-11-12 | End: 2024-11-12 | Stop reason: HOSPADM

## 2024-11-12 RX ORDER — HYDROMORPHONE HYDROCHLORIDE 2 MG/ML
0.2 INJECTION, SOLUTION INTRAMUSCULAR; INTRAVENOUS; SUBCUTANEOUS EVERY 5 MIN PRN
Status: DISCONTINUED | OUTPATIENT
Start: 2024-11-12 | End: 2024-11-12 | Stop reason: HOSPADM

## 2024-11-12 RX ORDER — METHYLENE BLUE 5 MG/ML
INJECTION INTRAVENOUS
Status: DISCONTINUED | OUTPATIENT
Start: 2024-11-12 | End: 2024-11-12 | Stop reason: HOSPADM

## 2024-11-12 RX ORDER — BUPIVACAINE HYDROCHLORIDE 2.5 MG/ML
INJECTION, SOLUTION INFILTRATION; PERINEURAL
Status: DISCONTINUED | OUTPATIENT
Start: 2024-11-12 | End: 2024-11-12 | Stop reason: HOSPADM

## 2024-11-12 RX ORDER — MUPIROCIN 20 MG/G
OINTMENT TOPICAL
Status: COMPLETED | OUTPATIENT
Start: 2024-11-12 | End: 2024-11-12

## 2024-11-12 RX ORDER — DIPHENHYDRAMINE HYDROCHLORIDE 50 MG/ML
25 INJECTION INTRAMUSCULAR; INTRAVENOUS EVERY 4 HOURS PRN
Status: DISCONTINUED | OUTPATIENT
Start: 2024-11-12 | End: 2024-11-12 | Stop reason: HOSPADM

## 2024-11-12 RX ORDER — SCOLOPAMINE TRANSDERMAL SYSTEM 1 MG/1
1 PATCH, EXTENDED RELEASE TRANSDERMAL ONCE
Status: DISCONTINUED | OUTPATIENT
Start: 2024-11-12 | End: 2024-11-12 | Stop reason: HOSPADM

## 2024-11-12 RX ORDER — DEXAMETHASONE SODIUM PHOSPHATE 4 MG/ML
INJECTION, SOLUTION INTRA-ARTICULAR; INTRALESIONAL; INTRAMUSCULAR; INTRAVENOUS; SOFT TISSUE
Status: DISCONTINUED | OUTPATIENT
Start: 2024-11-12 | End: 2024-11-12

## 2024-11-12 RX ORDER — PROPOFOL 10 MG/ML
VIAL (ML) INTRAVENOUS
Status: DISCONTINUED | OUTPATIENT
Start: 2024-11-12 | End: 2024-11-12

## 2024-11-12 RX ORDER — HYDROCODONE BITARTRATE AND ACETAMINOPHEN 10; 325 MG/1; MG/1
1 TABLET ORAL EVERY 4 HOURS PRN
Status: DISCONTINUED | OUTPATIENT
Start: 2024-11-12 | End: 2024-11-12 | Stop reason: HOSPADM

## 2024-11-12 RX ADMIN — SODIUM CHLORIDE: 0.9 INJECTION, SOLUTION INTRAVENOUS at 07:11

## 2024-11-12 RX ADMIN — EPHEDRINE SULFATE 10 MG: 50 INJECTION, SOLUTION INTRAMUSCULAR; INTRAVENOUS; SUBCUTANEOUS at 09:11

## 2024-11-12 RX ADMIN — ONDANSETRON 4 MG: 2 INJECTION, SOLUTION INTRAMUSCULAR; INTRAVENOUS at 09:11

## 2024-11-12 RX ADMIN — FAMOTIDINE 20 MG: 20 TABLET ORAL at 06:11

## 2024-11-12 RX ADMIN — PROCHLORPERAZINE EDISYLATE 5 MG: 5 INJECTION INTRAMUSCULAR; INTRAVENOUS at 11:11

## 2024-11-12 RX ADMIN — ROCURONIUM BROMIDE 10 MG: 10 INJECTION, SOLUTION INTRAVENOUS at 09:11

## 2024-11-12 RX ADMIN — CELECOXIB 400 MG: 100 CAPSULE ORAL at 06:11

## 2024-11-12 RX ADMIN — PROPOFOL 200 MG: 10 INJECTION, EMULSION INTRAVENOUS at 07:11

## 2024-11-12 RX ADMIN — PHENYLEPHRINE HYDROCHLORIDE 200 MCG: 10 INJECTION INTRAVENOUS at 07:11

## 2024-11-12 RX ADMIN — ROCURONIUM BROMIDE 5 MG: 10 INJECTION, SOLUTION INTRAVENOUS at 07:11

## 2024-11-12 RX ADMIN — CEFAZOLIN 2 G: 330 INJECTION, POWDER, FOR SOLUTION INTRAMUSCULAR; INTRAVENOUS at 07:11

## 2024-11-12 RX ADMIN — KETOROLAC TROMETHAMINE 30 MG: 30 INJECTION, SOLUTION INTRAMUSCULAR; INTRAVENOUS at 09:11

## 2024-11-12 RX ADMIN — SUGAMMADEX 200 MG: 100 INJECTION, SOLUTION INTRAVENOUS at 10:11

## 2024-11-12 RX ADMIN — HYDROCODONE BITARTRATE AND ACETAMINOPHEN 1 TABLET: 5; 325 TABLET ORAL at 11:11

## 2024-11-12 RX ADMIN — ONDANSETRON 8 MG: 8 TABLET, ORALLY DISINTEGRATING ORAL at 02:11

## 2024-11-12 RX ADMIN — MIDAZOLAM HYDROCHLORIDE 2 MG: 1 INJECTION, SOLUTION INTRAMUSCULAR; INTRAVENOUS at 07:11

## 2024-11-12 RX ADMIN — EPHEDRINE SULFATE 10 MG: 50 INJECTION, SOLUTION INTRAMUSCULAR; INTRAVENOUS; SUBCUTANEOUS at 07:11

## 2024-11-12 RX ADMIN — ALBUMIN (HUMAN) 250 ML: 12.5 SOLUTION INTRAVENOUS at 07:11

## 2024-11-12 RX ADMIN — KETOROLAC TROMETHAMINE 30 MG: 30 INJECTION, SOLUTION INTRAMUSCULAR; INTRAVENOUS at 01:11

## 2024-11-12 RX ADMIN — DEXAMETHASONE SODIUM PHOSPHATE 8 MG: 4 INJECTION, SOLUTION INTRA-ARTICULAR; INTRALESIONAL; INTRAMUSCULAR; INTRAVENOUS; SOFT TISSUE at 07:11

## 2024-11-12 RX ADMIN — MUPIROCIN: 20 OINTMENT TOPICAL at 06:11

## 2024-11-12 RX ADMIN — PHENYLEPHRINE HYDROCHLORIDE 100 MCG: 10 INJECTION INTRAVENOUS at 07:11

## 2024-11-12 RX ADMIN — EPHEDRINE SULFATE 5 MG: 50 INJECTION, SOLUTION INTRAMUSCULAR; INTRAVENOUS; SUBCUTANEOUS at 08:11

## 2024-11-12 RX ADMIN — HYDROMORPHONE HYDROCHLORIDE 0.2 MG: 2 INJECTION INTRAMUSCULAR; INTRAVENOUS; SUBCUTANEOUS at 10:11

## 2024-11-12 RX ADMIN — ACETAMINOPHEN 1000 MG: 500 TABLET ORAL at 06:11

## 2024-11-12 RX ADMIN — ROCURONIUM BROMIDE 45 MG: 10 INJECTION, SOLUTION INTRAVENOUS at 07:11

## 2024-11-12 RX ADMIN — ROCURONIUM BROMIDE 20 MG: 10 INJECTION, SOLUTION INTRAVENOUS at 08:11

## 2024-11-12 RX ADMIN — EPHEDRINE SULFATE 5 MG: 50 INJECTION, SOLUTION INTRAMUSCULAR; INTRAVENOUS; SUBCUTANEOUS at 07:11

## 2024-11-12 RX ADMIN — SODIUM CHLORIDE: 0.9 INJECTION, SOLUTION INTRAVENOUS at 08:11

## 2024-11-12 RX ADMIN — FENTANYL CITRATE 100 MCG: 50 INJECTION INTRAMUSCULAR; INTRAVENOUS at 07:11

## 2024-11-12 RX ADMIN — SUCCINYLCHOLINE CHLORIDE 180 MG: 20 INJECTION, SOLUTION INTRAMUSCULAR; INTRAVENOUS at 07:11

## 2024-11-12 RX ADMIN — PHENYLEPHRINE HYDROCHLORIDE 100 MCG: 10 INJECTION INTRAVENOUS at 09:11

## 2024-11-12 RX ADMIN — LIDOCAINE HYDROCHLORIDE 100 MG: 20 INJECTION, SOLUTION INTRAVENOUS at 07:11

## 2024-11-12 RX ADMIN — HYDROMORPHONE HYDROCHLORIDE 0.6 MG: 2 INJECTION INTRAMUSCULAR; INTRAVENOUS; SUBCUTANEOUS at 10:11

## 2024-11-12 RX ADMIN — SCOPOLAMINE 1 PATCH: 1 PATCH, EXTENDED RELEASE TRANSDERMAL at 07:11

## 2024-11-12 RX ADMIN — ALBUMIN (HUMAN) 250 ML: 12.5 SOLUTION INTRAVENOUS at 08:11

## 2024-11-12 RX ADMIN — PREGABALIN 50 MG: 50 CAPSULE ORAL at 06:11

## 2024-11-12 RX ADMIN — ONDANSETRON 4 MG: 2 INJECTION, SOLUTION INTRAMUSCULAR; INTRAVENOUS at 07:11

## 2024-11-12 NOTE — BRIEF OP NOTE
BRIEF OPERATIVE NOTE       SUMMARY      Surgery Date: 11/12/2024     SURGEON: Sarah Hammonds    ASSISTANT: Trista Fisher; NICKY Goodwin    PREOP DIAGNOSIS:   Fibroids  Pelvic pressure and fullness    POSTOP DIAGNOSIS:    1. Fibroids  2. Pelvic pressure and fullness    PROCEDURES:   Robotic assisted laparoscopic lysis of adhesions  Robotic assisted laparoscopic myomectomy (5 fibroids)    ANESTHESIA: general    FINDINGS/KEY COMPONENTS: Uterus sounded to 12 cm. Adhesions of the omentum to the anterior abdominal wall. Obliteration of the posterior cul-de-sac due to filmy adhesions of the colon to the posterior uterus and adnexa bilaterally. Multiple large fibroids - intramural and subserosal. Fundal subserosal, 3 large anterior intramural, large posterior intramural. Small right, posterior subserosal fibroid in the area of the right uterine artery left in-situ. Left tube and ovary unable to be visualized due to adhesions. Normal right ovary. Right tube unable to be visualized due to adhesions.    ESTIMATED BLOOD LOSS: 120 cc    COMPLICATIONS: None    PATHOLOGY:   Specimens (From admission, onward)       Start     Ordered    11/12/24 1021  Specimen to Pathology, Surgery Gynecology and Obstetrics  Once        Comments: Pre-op Diagnosis: Fibroids, intramural [D25.1]Procedure(s):XI ROBOTIC MYOMECTOMY, UTERUS Number of specimens: 1Name of specimens: 1.  Fibroids     References:    Click here for ordering Quick Tip   Question Answer Comment   Procedure Type: Gynecology and Obstetrics    Specimen Class: Routine/Screening    Release to patient Immediate        11/12/24 1046

## 2024-11-12 NOTE — DISCHARGE SUMMARY
[facility] ([facility])  Discharge Note  Short Stay    Procedure(s) (LRB):  XI ROBOTIC MYOMECTOMY, UTERUS (N/A)  ROBOTIC LYSIS, ADHESIONS (N/A)      OUTCOME: Patient tolerated treatment/procedure well without complication and is now ready for discharge.    DISPOSITION: Home or Self Care    FINAL DIAGNOSIS:  S/P myomectomy    FOLLOWUP: In clinic    DISCHARGE INSTRUCTIONS:    Discharge Procedure Orders   Notify your health care provider if you experience any of the following:  temperature >100.4     Notify your health care provider if you experience any of the following:  persistent nausea and vomiting or diarrhea     Notify your health care provider if you experience any of the following:  severe uncontrolled pain     Notify your health care provider if you experience any of the following:  redness, tenderness, or signs of infection (pain, swelling, redness, odor or green/yellow discharge around incision site)     Notify your health care provider if you experience any of the following:  difficulty breathing or increased cough     Notify your health care provider if you experience any of the following:  severe persistent headache     Notify your health care provider if you experience any of the following:  worsening rash     Notify your health care provider if you experience any of the following:  persistent dizziness, light-headedness, or visual disturbances     Notify your health care provider if you experience any of the following:  increased confusion or weakness      Sarah Fernandez MD, MAS, FACOG  Obstetrics and Gynecology

## 2024-11-12 NOTE — ANESTHESIA PROCEDURE NOTES
Intubation    Date/Time: 11/12/2024 7:25 AM    Performed by: Lesa Swanson  Authorized by: Sloane Wynn MD    Intubation:     Induction:  Rapid sequence induction    Intubated:  Postinduction    Mask Ventilation:  Not attempted    Attempts:  1    Attempted By:  Student (DIPIKA Howard)    Method of Intubation:  Direct    Blade:  Angela 3    Laryngeal View Grade: Grade IIb - only the arytenoids and epiglottis seen      Difficult Airway Encountered?: No      Airway Device:  Oral endotracheal tube    Airway Device Size:  7.5    Style/Cuff Inflation:  Cuffed (inflated to minimal occlusive pressure)    Tube secured:  22    Secured at:  The lips    Placement Verified By:  Capnometry    Complicating Factors:  None    Findings Post-Intubation:  BS equal bilateral and atraumatic/condition of teeth unchanged

## 2024-11-12 NOTE — TRANSFER OF CARE
"Anesthesia Transfer of Care Note    Patient: Brianne Blas    Procedure(s) Performed: Procedure(s) (LRB):  XI ROBOTIC MYOMECTOMY, UTERUS (N/A)  ROBOTIC LYSIS, ADHESIONS (N/A)    Patient location: PACU    Anesthesia Type: general    Transport from OR: Transported from OR on 6-10 L/min O2 by face mask with adequate spontaneous ventilation    Post pain: adequate analgesia    Post assessment: no apparent anesthetic complications and tolerated procedure well    Post vital signs: stable    Level of consciousness: awake and alert    Nausea/Vomiting: no nausea/vomiting    Complications: none    Transfer of care protocol was followed      Last vitals: Visit Vitals  BP (!) 147/65 (BP Location: Right arm, Patient Position: Lying)   Pulse 69   Temp 36.8 °C (98.2 °F) (Skin)   Resp 18   Ht 5' 7" (1.702 m)   Wt 113.4 kg (250 lb)   SpO2 98%   Breastfeeding No   BMI 39.16 kg/m²     "

## 2024-11-12 NOTE — OP NOTE
Surgery Date: 11/12/2024     SURGEON: Sarah Hammonds    ASSISTANT: Trista Fisher; NICKY Goodwin    PREOP DIAGNOSIS:   Fibroids  Pelvic pressure and fullness    POSTOP DIAGNOSIS:    1. Fibroids  2. Pelvic pressure and fullness    PROCEDURES:   Robotic assisted laparoscopic lysis of adhesions  Robotic assisted laparoscopic myomectomy (5 fibroids)    ANESTHESIA: general    FINDINGS/KEY COMPONENTS: Uterus sounded to 12 cm. Adhesions of the omentum to the anterior abdominal wall. Obliteration of the posterior cul-de-sac due to filmy adhesions of the colon to the posterior uterus and adnexa bilaterally. Multiple large fibroids - intramural and subserosal. Fundal subserosal, 3 large anterior intramural, large posterior intramural. Small right, posterior subserosal fibroid in the area of the right uterine artery left in-situ. Left tube and ovary unable to be visualized due to adhesions. Normal right ovary. Right tube unable to be visualized due to adhesions. Chromopertubation was not performed due to inability to visualize fallopian tubes.    ESTIMATED BLOOD LOSS: 120 cc    COMPLICATIONS: None    IV FLUIDS: 500 cc NS, 500 cc Albumin    URINE OUTPUT: 450 cc    PROCEDURE: Patient was taking to the operating room where general anesthesia was administered and found to be adequate.  She was prepped and draped in the dorsal lithotomy position.  A weighted sterile speculum was placed in the vagina.  The anterior lip of the cervix was grasped with a single tooth tenaculum.  The uterus was sounded to approximately 12 cm.  A KRISH manipulator was placed inside the uterine cavity.  A monreal catheter was inserted into the bladder.    Gloves were changed.  A Veress needle was inserted into the umbilicus under tenting of the anterior abdominal wall.  Placement into the peritoneal cavity was confirmed via saline drop test.  The abdomen was insufflated to 15mm Hg using Carbon dioxide.  An 8 mm supraumbilical skin incision was  made with the scalpel.  A 8 mm trocar was advanced through this incision.  The camera was placed through the trocar.  Excellent hemostasis was noted.  The patient was placed in deep Trendelenburg.  An 8 mm left lateral skin incision was made with a scalpel and an 8 mm trocar was advanced through this incision under visualization of the camera.  A 12 mm left lateral skin incision was made with the scalpel.  A 12 mm AirSeal trocar was advanced through the incision under visualization of the camera.  An 8 mm right lateral skin incision was made with the scalpel.  An 8 mm trocar was advanced through this incision under visualization of the camera.  An 8 mm more lateral skin incision was made with the scalpel and an 8 mm trocar was advanced through this incision under visualization of the camera.  Excellent hemostasis was noted.  The robot was docked into place.  Instruments were placed.  The surgeon moved to the console for the procedure.      Attention was turned to the omental adhesion. This was freed using bipolar cautery and scissors. Excellent hemostasis was noted. Attention was turned to the posterior uterus. The pelvis was inspected. The above findings were noted. Filmy adhesions were noted. These were freed via gentle traction.     All fibroids were removed in the following fashion: 20 Units of Pitressin diluted in 100cc of Normal saline was injected into the serosa overlying the fibroid.  An incision was made with the monopolar scissors over the serosa of the fibroid. This incision was carried down to the fibroid with the scissors.  The fibroid was grasped with a laparoscopic single tooth tenaculum.  Using the bipolar [place], the fibroid was enucleated from the underlying myometrium.  Hemostasis was maintained.  Once the fibroid was completely enucleated, it was placed along a suture that was placed in the pericolic gutter for removal later.  The myometrium was reapproximated with 180 V-lock a running  fashion. The serosa was reapproximated with 180 V-lock in a baseball stitch fashion. Excellent hemostasis was noted.      The uterus was copiously irrigated.  Excellent hemostasis was noted. The robot was undocked. The 8 mm umbilical trocar was removed and the incision was extended to 12 mm. A morcellator was advanced through this incision under visualization of the camera.  The suture with fibroids was grasped, the suture was cut and the needle was removed. An endocatch bag was placed through the lateral trocar. The fibroids were placed in the bag and suture was removed. Each fibroid was grasped with the laparoscopic tenaculum and morcellated while in the bag. The bag was collapsed and removed from the abdomen. The abdomen was inspected and found to be free of injury and no pieces of fibroid were seen. The pelvis was copiously irrigated and suctioned. Excellent hemostasis was noted. The fascia of the 12 mm incisions was re-approximated with 0-Vicryl in a Alexis Pranav fashion. Of note, the umbilical incision was repaired with two interrupted suture using the Alexis Pranav.  The abdomen was deflated, and all trocars were removed. The subcutaneous tissue of the 12 mm incisions were closed with an interrupted 0-Vicryl stitch. The skin was closed with 4-0 Monocryl in a subcuticular fashion. The incisions were injected with .25% Marcaine. The KRISH and monreal were removed.  Sponge, lap, and needle counts were correct x 2.  The patient was taken to the recovery room awake and in stable condition.

## 2024-11-12 NOTE — ANESTHESIA POSTPROCEDURE EVALUATION
Anesthesia Post Evaluation    Patient: Brianne Blas    Procedure(s) Performed: Procedure(s) (LRB):  XI ROBOTIC MYOMECTOMY, UTERUS (N/A)  ROBOTIC LYSIS, ADHESIONS (N/A)    Final Anesthesia Type: general      Patient location during evaluation: PACU  Patient participation: Yes- Able to Participate  Level of consciousness: awake and alert  Post-procedure vital signs: reviewed and stable  Pain management: adequate  Airway patency: patent    PONV status at discharge: No PONV  Anesthetic complications: no      Cardiovascular status: blood pressure returned to baseline  Respiratory status: unassisted  Hydration status: euvolemic            Vitals Value Taken Time   /76 11/12/24 1500   Temp 36.7 °C (98.1 °F) 11/12/24 1500   Pulse 62 11/12/24 1500   Resp 14 11/12/24 1500   SpO2 96 % 11/12/24 1500         Event Time   Out of Recovery 12:05:00         Pain/Dayna Score: Pain Rating Prior to Med Admin: 6 (11/12/2024  1:12 PM)  Dayna Score: 10 (11/12/2024  3:00 PM)

## 2024-11-14 LAB
FINAL PATHOLOGIC DIAGNOSIS: NORMAL
GROSS: NORMAL
Lab: NORMAL

## 2024-11-15 ENCOUNTER — PATIENT MESSAGE (OUTPATIENT)
Dept: OBSTETRICS AND GYNECOLOGY | Facility: CLINIC | Age: 44
End: 2024-11-15
Payer: COMMERCIAL

## 2024-11-19 ENCOUNTER — OFFICE VISIT (OUTPATIENT)
Dept: OBSTETRICS AND GYNECOLOGY | Facility: CLINIC | Age: 44
End: 2024-11-19
Payer: COMMERCIAL

## 2024-11-19 VITALS
BODY MASS INDEX: 39.22 KG/M2 | SYSTOLIC BLOOD PRESSURE: 151 MMHG | DIASTOLIC BLOOD PRESSURE: 86 MMHG | WEIGHT: 250.44 LBS

## 2024-11-19 DIAGNOSIS — Z98.890 POST-OPERATIVE STATE: Primary | ICD-10-CM

## 2024-11-19 PROCEDURE — 99024 POSTOP FOLLOW-UP VISIT: CPT | Mod: S$GLB,,, | Performed by: OBSTETRICS & GYNECOLOGY

## 2024-11-19 PROCEDURE — 3044F HG A1C LEVEL LT 7.0%: CPT | Mod: CPTII,S$GLB,, | Performed by: OBSTETRICS & GYNECOLOGY

## 2024-11-19 PROCEDURE — 1159F MED LIST DOCD IN RCRD: CPT | Mod: CPTII,S$GLB,, | Performed by: OBSTETRICS & GYNECOLOGY

## 2024-11-19 PROCEDURE — 99999 PR PBB SHADOW E&M-EST. PATIENT-LVL III: CPT | Mod: PBBFAC,,, | Performed by: OBSTETRICS & GYNECOLOGY

## 2024-11-19 PROCEDURE — 3077F SYST BP >= 140 MM HG: CPT | Mod: CPTII,S$GLB,, | Performed by: OBSTETRICS & GYNECOLOGY

## 2024-11-19 PROCEDURE — 3079F DIAST BP 80-89 MM HG: CPT | Mod: CPTII,S$GLB,, | Performed by: OBSTETRICS & GYNECOLOGY

## 2024-11-19 NOTE — PROGRESS NOTES
CC: Post-op    HPI: 44 y.o.  female patient presents today following Robotic myomectomy surgery.  There are no complaints and the procedure and hospitalization occured without complications.     MEDICATIONS: See Med Card    ALLERGIES: See Allergy Card    MEDICAL HISTORY:   PAST MEDICAL HISTORY:   Past Medical History:   Diagnosis Date    Fibroids     Hypertension     Migraine     Severe obesity (BMI 35.0-39.9) with comorbidity         PAST SURGICAL HISTORY:   Past Surgical History:   Procedure Laterality Date    MYOMECTOMY      ROBOT-ASSISTED LYSIS OF ADHESIONS N/A 11/12/2024    Procedure: ROBOTIC LYSIS, ADHESIONS;  Surgeon: Sarah Hammonds MD;  Location: [facility] OR;  Service: OB/GYN;  Laterality: N/A;    ROBOT-ASSISTED UTERINE MYOMECTOMY USING DA SANDRA XI N/A 11/12/2024    Procedure: XI ROBOTIC MYOMECTOMY, UTERUS;  Surgeon: Sarah Hammonds MD;  Location: [facility] OR;  Service: OB/GYN;  Laterality: N/A;        FAMILY HISTORY:   Family History   Problem Relation Name Age of Onset    Breast cancer Mother Mom 40        Breast Cancer x 2    Cancer Mother Mom     Hypertension Father Dad     Iron deficiency Sister      No Known Problems Brother      Breast cancer Maternal Grandmother      Colon cancer Neg Hx      Ovarian cancer Neg Hx          SOCIAL HISTORY:   Social History     Tobacco Use    Smoking status: Never    Smokeless tobacco: Never   Substance Use Topics    Alcohol use: Yes     Comment: Occasionally    Drug use: No          ROS: Negative other than HPI.    PE:   General: Appears well  Abdomen: Soft, no tenderness, no distention, no hepatosplenomegaly. Incisions are well healed  Extremities: No cords or edema      ASSESSMENT: Routine postoperative follow-up exam    PLAN:   Follow-up with Dr. TRISTAN as scheduled.

## 2024-12-05 ENCOUNTER — PATIENT MESSAGE (OUTPATIENT)
Dept: OBSTETRICS AND GYNECOLOGY | Facility: CLINIC | Age: 44
End: 2024-12-05
Payer: COMMERCIAL

## 2024-12-05 NOTE — LETTER
December 10, 2024    Brianne Blas  [address]  [place] [address]         [facility]  [address]  [place]   [place] [address]  Phone: [phone] December 10, 2024     Patient: Brianne Blas   YOB: 1980   Date of Visit: 12/5/2024       To Whom It May Concern:    It is my medical opinion that Brianne Blas  is medically cleared to return to work for light duty on 12/12 and to full duty with no restrictions on 1/3/2025 .    If you have any questions or concerns, please don't hesitate to call.    Sincerely,         Sarah Hammonds MD

## 2024-12-05 NOTE — LETTER
December 12, 2024    Brianne Blas  [address]  [place] [address]         [facility]  [address]  [place]   [place] [address]  Phone: [phone] December 12, 2024     Patient: Brianne Blas   YOB: 1980   Date of Visit: 12/5/2024       To Whom It May Concern:    It is my medical opinion that Brianne Blas may return to light duty immediately with the following restrictions: no standing for long periods, no heavy lifting, no pushing or pulling .    If you have any questions or concerns, please don't hesitate to call.    Sincerely,         Sarah Hammonds MD

## 2024-12-10 NOTE — TELEPHONE ENCOUNTER
Please apologize to the patient. I don't see that the message was sent to me 5 days ago. Letter on chart.

## 2024-12-11 NOTE — TELEPHONE ENCOUNTER
"Hello,    Letter was written for pt, but needs specifics about what is "light duty".     Patient explains what the letter should say in last message.    Please advise :)  "

## 2024-12-23 NOTE — ASSESSMENT & PLAN NOTE
BP controlled on no meds.  Had swelling with Nifedipine, also on Labetalol in past.  Checks BP at work daily, /80. Watches sodium in diet, has been exercising 4 times per week (doing Hans and strength training).  
Detail Level: Detailed
Stable on imitrex PRN.  Has not had migraines as much lately.  
Vit D was 15 in 4/2021, taking OTC supplement.  
Add 39923 Cpt? (Important Note: In 2017 The Use Of 84736 Is Being Tracked By Cms To Determine Future Global Period Reimbursement For Global Periods): no

## 2025-01-02 ENCOUNTER — OFFICE VISIT (OUTPATIENT)
Dept: OBSTETRICS AND GYNECOLOGY | Facility: CLINIC | Age: 45
End: 2025-01-02
Payer: COMMERCIAL

## 2025-01-02 VITALS
HEIGHT: 67 IN | BODY MASS INDEX: 40.52 KG/M2 | DIASTOLIC BLOOD PRESSURE: 98 MMHG | WEIGHT: 258.19 LBS | SYSTOLIC BLOOD PRESSURE: 166 MMHG

## 2025-01-02 DIAGNOSIS — Z09 POSTOP CHECK: Primary | ICD-10-CM

## 2025-01-02 PROCEDURE — 3077F SYST BP >= 140 MM HG: CPT | Mod: CPTII,S$GLB,, | Performed by: OBSTETRICS & GYNECOLOGY

## 2025-01-02 PROCEDURE — 3080F DIAST BP >= 90 MM HG: CPT | Mod: CPTII,S$GLB,, | Performed by: OBSTETRICS & GYNECOLOGY

## 2025-01-02 PROCEDURE — 99999 PR PBB SHADOW E&M-EST. PATIENT-LVL III: CPT | Mod: PBBFAC,,, | Performed by: OBSTETRICS & GYNECOLOGY

## 2025-01-02 PROCEDURE — 1160F RVW MEDS BY RX/DR IN RCRD: CPT | Mod: CPTII,S$GLB,, | Performed by: OBSTETRICS & GYNECOLOGY

## 2025-01-02 PROCEDURE — 99024 POSTOP FOLLOW-UP VISIT: CPT | Mod: S$GLB,,, | Performed by: OBSTETRICS & GYNECOLOGY

## 2025-01-02 PROCEDURE — 1159F MED LIST DOCD IN RCRD: CPT | Mod: CPTII,S$GLB,, | Performed by: OBSTETRICS & GYNECOLOGY

## 2025-01-02 NOTE — PROGRESS NOTES
"CC: Postoperative visit    Brianne Familia Blas is a 44 y.o. female  presents for a postoperative visit s/p DVM on 2024.  Her postoperative course was uncomplicated.  She is doing well postoperative.    Pathology showed:  Final Pathologic Diagnosis Multiple fragments of leiomyomas measuring 17 cm and weighing 279 g in aggregate.       BP (!) 166/98   Ht 5' 7" (1.702 m)   Wt 117.1 kg (258 lb 2.5 oz)   LMP 12/10/2024 (Exact Date)   BMI 40.43 kg/m²     ROS:  GENERAL: No fever, chills, fatigability or weight loss.  VULVAR: No pain, no lesions and no itching.  VAGINAL: No relaxation, no itching, no discharge, no abnormal bleeding and no lesions.  ABDOMEN: No abdominal pain. Denies nausea. Denies vomiting. No diarrhea. No constipation  BREAST: Denies pain. No lumps. No discharge.  URINARY: No incontinence, no nocturia, no frequency and no dysuria.  CARDIOVASCULAR: No chest pain. No shortness of breath. No leg cramps.  NEUROLOGICAL: No headaches. No vision changes.    Physical Exam  APPEARANCE: Well nourished, well developed, in no acute distress.  AFFECT: WNL, alert and oriented x 3  SKIN: No acne or hirsutism  ABDOMEN: Soft.  No tenderness or masses.  No hepatosplenomegaly.  No hernias.  INCISIONS: Clean, dry, intact. Well healed.   PELVIC: Normal external genitalia without lesions.  Normal hair distribution.  Adequate perineal body, normal urethral meatus.  Vagina moist and well rugated without lesions or discharge.  Cervix pink, without lesions, discharge or tenderness.  No significant cystocele or rectocele.  Bimanual exam shows uterus to be normal size, regular, mobile and nontender.  Adnexa without masses or tenderness.    EXTREMITIES: No edema.      1. Postop check            Surgical findings discussed.     Patient can return to normal activities. Delay conception for 4 months. Return to clinic in 1 year for well woman exam.    "

## 2025-04-07 ENCOUNTER — PATIENT MESSAGE (OUTPATIENT)
Dept: PRIMARY CARE CLINIC | Facility: CLINIC | Age: 45
End: 2025-04-07
Payer: COMMERCIAL

## 2025-04-07 DIAGNOSIS — Z00.00 ROUTINE ADULT HEALTH MAINTENANCE: Primary | ICD-10-CM

## 2025-04-07 NOTE — TELEPHONE ENCOUNTER
LOV with Vashti López MD , 6/25/2024  Last mammogram 12/26/23.  Pt requesting mammogram. Order pended, if appropriate.

## 2025-05-11 ENCOUNTER — PATIENT MESSAGE (OUTPATIENT)
Dept: OBSTETRICS AND GYNECOLOGY | Facility: CLINIC | Age: 45
End: 2025-05-11
Payer: COMMERCIAL

## 2025-05-11 DIAGNOSIS — N97.0 OLIGO-OVULATION: Primary | ICD-10-CM

## 2025-05-16 ENCOUNTER — PATIENT MESSAGE (OUTPATIENT)
Dept: PRIMARY CARE CLINIC | Facility: CLINIC | Age: 45
End: 2025-05-16
Payer: COMMERCIAL

## 2025-05-16 RX ORDER — LETROZOLE 2.5 MG/1
2.5 TABLET, FILM COATED ORAL DAILY
Qty: 5 TABLET | Refills: 6 | Status: SHIPPED | OUTPATIENT
Start: 2025-05-16 | End: 2025-05-21

## 2025-06-10 ENCOUNTER — LAB VISIT (OUTPATIENT)
Dept: LAB | Facility: HOSPITAL | Age: 45
End: 2025-06-10
Attending: OBSTETRICS & GYNECOLOGY
Payer: COMMERCIAL

## 2025-06-10 DIAGNOSIS — N97.0 OLIGO-OVULATION: ICD-10-CM

## 2025-06-10 LAB — PROGEST SERPL-MCNC: 8 NG/ML

## 2025-06-10 PROCEDURE — 84144 ASSAY OF PROGESTERONE: CPT

## 2025-06-10 PROCEDURE — 36415 COLL VENOUS BLD VENIPUNCTURE: CPT

## 2025-06-16 ENCOUNTER — PATIENT MESSAGE (OUTPATIENT)
Dept: OBSTETRICS AND GYNECOLOGY | Facility: CLINIC | Age: 45
End: 2025-06-16
Payer: COMMERCIAL

## 2025-06-16 DIAGNOSIS — N97.0 OLIGO-OVULATION: Primary | ICD-10-CM

## 2025-06-25 ENCOUNTER — PATIENT OUTREACH (OUTPATIENT)
Dept: ADMINISTRATIVE | Facility: HOSPITAL | Age: 45
End: 2025-06-25
Payer: COMMERCIAL

## 2025-06-26 ENCOUNTER — OFFICE VISIT (OUTPATIENT)
Dept: PRIMARY CARE CLINIC | Facility: CLINIC | Age: 45
End: 2025-06-26
Payer: COMMERCIAL

## 2025-06-26 ENCOUNTER — HOSPITAL ENCOUNTER (OUTPATIENT)
Dept: RADIOLOGY | Facility: HOSPITAL | Age: 45
Discharge: HOME | End: 2025-06-26
Attending: INTERNAL MEDICINE
Payer: COMMERCIAL

## 2025-06-26 VITALS — BODY MASS INDEX: 40.34 KG/M2 | HEIGHT: 67 IN | WEIGHT: 257 LBS

## 2025-06-26 VITALS
BODY MASS INDEX: 40.38 KG/M2 | HEIGHT: 67 IN | DIASTOLIC BLOOD PRESSURE: 80 MMHG | SYSTOLIC BLOOD PRESSURE: 126 MMHG | WEIGHT: 257.25 LBS | HEART RATE: 71 BPM | OXYGEN SATURATION: 97 %

## 2025-06-26 DIAGNOSIS — G43.919 REFRACTORY MIGRAINE: ICD-10-CM

## 2025-06-26 DIAGNOSIS — D50.8 OTHER IRON DEFICIENCY ANEMIA: ICD-10-CM

## 2025-06-26 DIAGNOSIS — I10 ESSENTIAL HYPERTENSION: ICD-10-CM

## 2025-06-26 DIAGNOSIS — Z00.00 ANNUAL PHYSICAL EXAM: Primary | ICD-10-CM

## 2025-06-26 DIAGNOSIS — E66.01 SEVERE OBESITY (BMI >= 40): ICD-10-CM

## 2025-06-26 DIAGNOSIS — Z98.890 S/P MYOMECTOMY: ICD-10-CM

## 2025-06-26 DIAGNOSIS — N97.9 INFERTILITY, FEMALE: ICD-10-CM

## 2025-06-26 DIAGNOSIS — Z12.31 ENCOUNTER FOR SCREENING MAMMOGRAM FOR BREAST CANCER: ICD-10-CM

## 2025-06-26 PROCEDURE — 77063 BREAST TOMOSYNTHESIS BI: CPT | Mod: TC

## 2025-06-26 PROCEDURE — 99396 PREV VISIT EST AGE 40-64: CPT | Mod: S$GLB,,, | Performed by: INTERNAL MEDICINE

## 2025-06-26 PROCEDURE — 3008F BODY MASS INDEX DOCD: CPT | Mod: CPTII,S$GLB,, | Performed by: INTERNAL MEDICINE

## 2025-06-26 PROCEDURE — 1159F MED LIST DOCD IN RCRD: CPT | Mod: CPTII,S$GLB,, | Performed by: INTERNAL MEDICINE

## 2025-06-26 PROCEDURE — 3074F SYST BP LT 130 MM HG: CPT | Mod: CPTII,S$GLB,, | Performed by: INTERNAL MEDICINE

## 2025-06-26 PROCEDURE — 99999 PR PBB SHADOW E&M-EST. PATIENT-LVL III: CPT | Mod: PBBFAC,,, | Performed by: INTERNAL MEDICINE

## 2025-06-26 PROCEDURE — 3079F DIAST BP 80-89 MM HG: CPT | Mod: CPTII,S$GLB,, | Performed by: INTERNAL MEDICINE

## 2025-06-26 RX ORDER — LABETALOL 200 MG/1
200 TABLET, FILM COATED ORAL 2 TIMES DAILY
Qty: 180 TABLET | Refills: 3 | Status: SHIPPED | OUTPATIENT
Start: 2025-06-26 | End: 2026-06-26

## 2025-06-26 RX ORDER — HYDRALAZINE HYDROCHLORIDE 10 MG/1
10 TABLET, FILM COATED ORAL EVERY 12 HOURS
Qty: 180 TABLET | Refills: 3 | Status: SHIPPED | OUTPATIENT
Start: 2025-06-26 | End: 2026-06-26

## 2025-06-26 RX ORDER — SUMATRIPTAN SUCCINATE 25 MG/1
25 TABLET ORAL
Qty: 9 TABLET | Refills: 5 | Status: SHIPPED | OUTPATIENT
Start: 2025-06-26 | End: 2025-07-26

## 2025-06-26 RX ORDER — LETROZOLE 2.5 MG/1
2.5 TABLET, FILM COATED ORAL DAILY
COMMUNITY
Start: 2025-06-22

## 2025-06-26 NOTE — PROGRESS NOTES
[facility] Note    Chief Complaint      Chief Complaint   Patient presents with    Annual Exam     History of Present Illness      Briannegarry Blas is a 45 y.o. female who presents today for Annual preventative visit.  Patient comes to appointment alone.  [facility]N: [facility]      Problem List Items Addressed This Visit       Essential hypertension    Current Assessment & Plan   BP controlled on labetalol 200 mg and hydralazine 10 mg BID. BP controlled at home as well. Exericising 3 times per week, weight/pilates/cardio/kathi.  Diet has been good. No CP/SOB.         Relevant Medications    labetaloL (NORMODYNE) 200 MG tablet    hydrALAZINE (APRESOLINE) 10 MG tablet    Infertility, female    Current Assessment & Plan   Had ectopic in the fall 2023 after 3 rounds of Clomid. 12/2023 MRI with Dr. Hammonds with fibroids, had myomectomy 11/2024.  Now on 2nd round Letrozole.         Iron deficiency anemia    Current Assessment & Plan   Ferritin 16 7/2024 labs, not taking iron at present.         Relevant Orders    Ferritin    Iron and TIBC    Refractory migraine    Current Assessment & Plan   Stable on imitrex PRN.  Had a bad one last month but prior to then had been doing well.  Cycle related.         S/P robotic assisted laparoscopic myomectomy    Current Assessment & Plan   No issues post op, 11/2024 with Dr. Hammonds.         Severe obesity (BMI >= 40)    Relevant Orders    Hemoglobin A1C     Other Visit Diagnoses         Annual physical exam    -  Primary    Relevant Orders    Comprehensive Metabolic Panel    Lipid Panel    CBC Auto Differential                  Health Maintenance   Topic Date Due    Pneumococcal Vaccines (Age 0-49) (1 of 2 - PCV) Never done    COVID-19 Vaccine (1 - 2024-25 season) Never done    Mammogram  06/25/2025    Influenza Vaccine (Season Ended) 09/01/2025    TETANUS VACCINE  03/05/2027    Cervical Cancer Screening  09/16/2027    Hemoglobin A1c (Diabetic  Prevention Screening)  02/27/2028    RSV Vaccine (Age 60+ and Pregnant patients) (1 - 1-dose 75+ series) 05/15/2057    Hepatitis C Screening  Completed    HIV Screening  Completed    Lipid Panel  Completed       Past Medical History:   Diagnosis Date    Fibroids     Hypertension     Migraine     Severe obesity (BMI 35.0-39.9) with comorbidity        Past Surgical History:   Procedure Laterality Date    MYOMECTOMY      ROBOT-ASSISTED LYSIS OF ADHESIONS N/A 11/12/2024    Procedure: ROBOTIC LYSIS, ADHESIONS;  Surgeon: Sarah Hammonds MD;  Location: [facility] OR;  Service: OB/GYN;  Laterality: N/A;    ROBOT-ASSISTED UTERINE MYOMECTOMY USING DA SANDRA XI N/A 11/12/2024    Procedure: XI ROBOTIC MYOMECTOMY, UTERUS;  Surgeon: Sarah Hammonds MD;  Location: [facility] OR;  Service: OB/GYN;  Laterality: N/A;       family history includes Breast cancer in her maternal grandmother; Breast cancer (age of onset: 40) in her mother; Cancer in her mother; Hypertension in her father; Iron deficiency in her sister; No Known Problems in her brother.    Social History     Tobacco Use    Smoking status: Never     Passive exposure: Past    Smokeless tobacco: Never   Substance Use Topics    Alcohol use: Yes     Comment: Occasionally    Drug use: No       Review of Systems   Constitutional:  Negative for chills and fever.   Respiratory:  Negative for cough and shortness of breath.    Cardiovascular:  Negative for chest pain and palpitations.   Gastrointestinal:  Negative for constipation, diarrhea, nausea and vomiting.   Genitourinary:  Negative for dysuria and hematuria.   Musculoskeletal:  Negative for falls.   Neurological:  Negative for headaches.        Outpatient Encounter Medications as of 6/26/2025   Medication Sig Dispense Refill    letrozole (FEMARA) 2.5 mg Tab Take 2.5 mg by mouth once daily.      [DISCONTINUED] hydrALAZINE (APRESOLINE) 10 MG tablet Take 1 tablet (10 mg total) by mouth every 12 (twelve) hours. 180 tablet 3     "[DISCONTINUED] labetaloL (NORMODYNE) 200 MG tablet Take 1 tablet (200 mg total) by mouth 2 (two) times daily. 180 tablet 3    [DISCONTINUED] sumatriptan (IMITREX) 25 MG Tab Take 1 tablet (25 mg total) by mouth every 2 (two) hours as needed (migraines, not to exceed 2 doses in 24 hours). every 2 hours (no more than twice a day) 9 tablet 5    hydrALAZINE (APRESOLINE) 10 MG tablet Take 1 tablet (10 mg total) by mouth every 12 (twelve) hours. 180 tablet 3    labetaloL (NORMODYNE) 200 MG tablet Take 1 tablet (200 mg total) by mouth 2 (two) times daily. 180 tablet 3    sumatriptan (IMITREX) 25 MG Tab Take 1 tablet (25 mg total) by mouth every 2 (two) hours as needed (migraines, not to exceed 2 doses in 24 hours). every 2 hours (no more than twice a day) 9 tablet 5    [DISCONTINUED] HYDROcodone-acetaminophen (NORCO) 5-325 mg per tablet Take 1 tablet by mouth every 6 (six) hours as needed for Pain. (Patient not taking: Reported on 6/26/2025) 12 tablet 0    [DISCONTINUED] ibuprofen (ADVIL,MOTRIN) 800 MG tablet Take 1 tablet (800 mg total) by mouth every 8 (eight) hours as needed for Pain. (Patient not taking: Reported on 6/26/2025) 30 tablet 0     No facility-administered encounter medications on file as of 6/26/2025.        Review of patient's allergies indicates:   Allergen Reactions    Nifedipine Edema       Physical Exam      Vital Signs  Pulse: 71  SpO2: 97 %  BP: 126/80  Pain Score: 0-No pain  Height and Weight  Height: 5' 7" (170.2 cm)  Weight: 116.7 kg (257 lb 4.4 oz)  BSA (Calculated - sq m): 2.35 sq meters  BMI (Calculated): 40.3  Weight in (lb) to have BMI = 25: 159.3]    Physical Exam  Constitutional:       Appearance: She is well-developed.   HENT:      Head: Normocephalic and atraumatic.   Cardiovascular:      Rate and Rhythm: Normal rate and regular rhythm.      Heart sounds: Normal heart sounds. No murmur heard.  Pulmonary:      Effort: Pulmonary effort is normal. No respiratory distress.      Breath sounds: " "Normal breath sounds.   Abdominal:      General: There is no distension.      Palpations: Abdomen is soft.      Tenderness: There is no abdominal tenderness. There is no guarding.   Skin:     General: Skin is warm and dry.   Neurological:      Mental Status: She is alert. Mental status is at baseline.   Psychiatric:         Behavior: Behavior normal.          Laboratory:  CBC:  No results for input(s): "WBC", "RBC", "HGB", "HCT", "PLT", "MCV", "MCH", "MCHC" in the last 2160 hours.    CMP:  No results for input(s): "GLU", "CALCIUM", "ALBUMIN", "PROT", "NA", "K", "CO2", "CL", "BUN", "ALKPHOS", "ALT", "AST", "BILITOT" in the last 2160 hours.    Invalid input(s): "CREATININ"    URINALYSIS:  No results for input(s): "COLORU", "CLARITYU", "SPECGRAV", "PHUR", "PROTEINUA", "GLUCOSEU", "BILIRUBINCON", "BLOODU", "WBCU", "RBCU", "BACTERIA", "MUCUS", "NITRITE", "LEUKOCYTESUR", "UROBILINOGEN", "HYALINECASTS" in the last 2160 hours.   LIPIDS:  No results for input(s): "TSH", "HDL", "CHOL", "TRIG", "LDLCALC", "CHOLHDL", "NONHDLCHOL", "TOTALCHOLEST" in the last 2160 hours.    TSH:  No results for input(s): "TSH" in the last 2160 hours.  A1C:  No results for input(s): "HGBA1C" in the last 2160 hours.      Radiology:  No results found in the last 30 days.     Assessment/Plan     Brianne Blas is a 45 y.o.female with:    1. Annual physical exam  - Comprehensive Metabolic Panel; Future  - Lipid Panel; Future  - CBC Auto Differential; Future    2. Refractory migraine    3. Infertility, female    4. Essential hypertension  - labetaloL (NORMODYNE) 200 MG tablet; Take 1 tablet (200 mg total) by mouth 2 (two) times daily.  Dispense: 180 tablet; Refill: 3  - hydrALAZINE (APRESOLINE) 10 MG tablet; Take 1 tablet (10 mg total) by mouth every 12 (twelve) hours.  Dispense: 180 tablet; Refill: 3    5. S/P robotic assisted laparoscopic myomectomy    6. Other iron deficiency anemia  - Ferritin; Future  - Iron and TIBC; Future    7. " Severe obesity (BMI >= 40)  - Hemoglobin A1C; Future      -check labs  -Continue current medications and maintain follow up with specialists.    -Follow up in about 1 year (around 6/26/2026) for Annual Visit.       Vashti López MD  [facility]

## 2025-06-26 NOTE — ASSESSMENT & PLAN NOTE
Stable on imitrex PRN.  Had a bad one last month but prior to then had been doing well.  Cycle related.

## 2025-06-26 NOTE — ASSESSMENT & PLAN NOTE
Had ectopic in the fall 2023 after 3 rounds of Clomid. 12/2023 MRI with Dr. Hammonds with fibroids, had myomectomy 11/2024.  Now on 2nd round Letrozole.

## 2025-07-03 ENCOUNTER — RESULTS FOLLOW-UP (OUTPATIENT)
Dept: PRIMARY CARE CLINIC | Facility: CLINIC | Age: 45
End: 2025-07-03

## 2025-07-11 ENCOUNTER — LAB VISIT (OUTPATIENT)
Dept: LAB | Facility: HOSPITAL | Age: 45
End: 2025-07-11
Attending: OBSTETRICS & GYNECOLOGY
Payer: COMMERCIAL

## 2025-07-11 DIAGNOSIS — N97.0 OLIGO-OVULATION: ICD-10-CM

## 2025-07-11 LAB — PROGEST SERPL-MCNC: 12.2 NG/ML

## 2025-07-11 PROCEDURE — 36415 COLL VENOUS BLD VENIPUNCTURE: CPT

## 2025-07-11 PROCEDURE — 84144 ASSAY OF PROGESTERONE: CPT

## 2025-07-14 ENCOUNTER — PATIENT MESSAGE (OUTPATIENT)
Dept: OBSTETRICS AND GYNECOLOGY | Facility: CLINIC | Age: 45
End: 2025-07-14
Payer: COMMERCIAL

## 2025-08-07 ENCOUNTER — LAB VISIT (OUTPATIENT)
Dept: LAB | Facility: HOSPITAL | Age: 45
End: 2025-08-07
Attending: OBSTETRICS & GYNECOLOGY
Payer: COMMERCIAL

## 2025-08-07 DIAGNOSIS — N97.0 OLIGO-OVULATION: ICD-10-CM

## 2025-08-07 LAB — PROGEST SERPL-MCNC: 7.7 NG/ML

## 2025-08-07 PROCEDURE — 84144 ASSAY OF PROGESTERONE: CPT

## 2025-08-07 PROCEDURE — 36415 COLL VENOUS BLD VENIPUNCTURE: CPT

## (undated) DEVICE — SET TRI-LUMEN FILTERED TUBE

## (undated) DEVICE — Device

## (undated) DEVICE — DRESSING ABSRBNT ISLAND 3.6X8

## (undated) DEVICE — DRAPE LAP TIBURON 77X122IN

## (undated) DEVICE — BELLOW CANN HEMOBLAST 1.65GR

## (undated) DEVICE — DRAPE COLUMN DAVINCI XI

## (undated) DEVICE — SOL IRR NACL .9% 1000CC

## (undated) DEVICE — DECANTER FLUID TRNSF WHITE 9IN

## (undated) DEVICE — MANIPULATOR TIP RUMI ORANGE

## (undated) DEVICE — CLOSURE SKIN STERI STRIP 1/2X4

## (undated) DEVICE — BAG INZII TISS RETRV 12/15MM

## (undated) DEVICE — COVER MAYO STND XL 30X57IN

## (undated) DEVICE — PAD PINK TRENDELENBURG POS XL

## (undated) DEVICE — SUT 2/0 27IN PDS II VIO MO

## (undated) DEVICE — COVER OVERHEAD SURG LT BLUE

## (undated) DEVICE — CATH URETHRAL 16FR RED

## (undated) DEVICE — SUT 1 36IN PDS II VIO MONO

## (undated) DEVICE — SUT MONOCRYL 4-0 PS-1 UND

## (undated) DEVICE — NDL HYPO REG 25G X 1 1/2

## (undated) DEVICE — SOL BETADINE 5%

## (undated) DEVICE — APPLICATOR HEMOBLAST LAPSCP

## (undated) DEVICE — COVER TABLE HVY DTY 60X90IN

## (undated) DEVICE — SUT VICRYL PLUS 0 CT1 36IN

## (undated) DEVICE — WARMER DRAPE STERILE LF

## (undated) DEVICE — SUT MCRYL PLUS 4-0 PS2 27IN

## (undated) DEVICE — DRAPE UINDERBUT GRAD PCH

## (undated) DEVICE — SUT 0 VICRYL / UR6 (J603)

## (undated) DEVICE — DRAPE ARM DAVINCI XI

## (undated) DEVICE — SOL CLEARIFY VISUALIZATION LAP

## (undated) DEVICE — BANDAGE ADHESIVE PLAS STRL 1X3

## (undated) DEVICE — PAD ABD 8X10 STERILE

## (undated) DEVICE — JELLY KY LUBRICATING 5G PACKET

## (undated) DEVICE — GLOVE SURG BIOGEL SZ 6.5

## (undated) DEVICE — TRAY SKIN SCRUB WET PREMIUM

## (undated) DEVICE — SYS CLSR WND ENDOSCP XL

## (undated) DEVICE — TRAY DRY SKIN SCRUB PREP

## (undated) DEVICE — SEE MEDLINE ITEM 153151

## (undated) DEVICE — UNDERGLOVE BIOGEL PI SZ 6.5 LF

## (undated) DEVICE — SEE MEDLINE ITEM 152622

## (undated) DEVICE — SUT PDS II 0 CT VIL MONO 36

## (undated) DEVICE — IRRIGATOR ENDOSCOPY DISP.

## (undated) DEVICE — OBTURATOR BLADELESS 8MM XI CLR

## (undated) DEVICE — COVER LIGHT HANDLE 80/CA

## (undated) DEVICE — HANDPIECE MORCELLATOR LINA

## (undated) DEVICE — APPLICATOR CHLORAPREP ORN 26ML

## (undated) DEVICE — DRESSING MEPORE ADH 3.5X12

## (undated) DEVICE — GLOVE BIOGEL SKINSENSE PI 6.0

## (undated) DEVICE — GLOVE BIOGEL ULTRATOUCH 6.5

## (undated) DEVICE — PORT AIRSEAL 12/120MM LPI

## (undated) DEVICE — TRAY FOLEY 16FR INFECTION CONT

## (undated) DEVICE — DRAPE STERI LONG

## (undated) DEVICE — SUT V-LOC 180 GRN GS-21 12IN

## (undated) DEVICE — SUT 0 V-LOC GR GS-21 TAPER

## (undated) DEVICE — NDL INSUF ULTRA VERESS 120MM

## (undated) DEVICE — SOL WATER STRL IRR 1000ML

## (undated) DEVICE — SUT MONOCRYL 2-0 VIOL 27IN

## (undated) DEVICE — DRESSING WND ADH PRIMAPORE 10

## (undated) DEVICE — DRESSING AQUACEL SACRAL 9 X 9

## (undated) DEVICE — SEPRAFILM 3 X 5  MULTI-PACK

## (undated) DEVICE — SEAL UNIVERSAL 5MM-8MM XI

## (undated) DEVICE — SEALER LIGASURE CRV 18.8CM

## (undated) DEVICE — SUT VICRYL 2-0 36 CT-1

## (undated) DEVICE — COVER TIP CURVED SCISSORS XI

## (undated) DEVICE — DRAPE LEGGINGS CUFF 33X51IN

## (undated) DEVICE — ELECTRODE REM PLYHSV RETURN 9

## (undated) DEVICE — DRAPE TOP 53X102IN

## (undated) DEVICE — SYR 10CC LUER LOCK

## (undated) DEVICE — NDL SPINAL SPINOCAN 22GX3.5

## (undated) DEVICE — SOL PVP-I SCRUB 7.5% 4OZ

## (undated) DEVICE — GLOVE BIOGEL PIMICRO INDIC 6.5

## (undated) DEVICE — KIT URINE METER 350ML STAT LOC